# Patient Record
Sex: FEMALE | Race: ASIAN | NOT HISPANIC OR LATINO | Employment: OTHER | URBAN - METROPOLITAN AREA
[De-identification: names, ages, dates, MRNs, and addresses within clinical notes are randomized per-mention and may not be internally consistent; named-entity substitution may affect disease eponyms.]

---

## 2017-05-29 ENCOUNTER — OFFICE VISIT (OUTPATIENT)
Dept: PHYSICAL MEDICINE AND REHAB | Facility: CLINIC | Age: 32
End: 2017-05-29
Payer: MEDICARE

## 2017-05-29 VITALS
HEIGHT: 63 IN | DIASTOLIC BLOOD PRESSURE: 60 MMHG | BODY MASS INDEX: 19.14 KG/M2 | HEART RATE: 79 BPM | SYSTOLIC BLOOD PRESSURE: 95 MMHG | WEIGHT: 108 LBS

## 2017-05-29 DIAGNOSIS — G89.29 CHRONIC MIDLINE LOW BACK PAIN WITH BILATERAL SCIATICA: Primary | ICD-10-CM

## 2017-05-29 DIAGNOSIS — G11.9 CEREBELLAR ATAXIA: ICD-10-CM

## 2017-05-29 DIAGNOSIS — M54.41 CHRONIC MIDLINE LOW BACK PAIN WITH BILATERAL SCIATICA: Primary | ICD-10-CM

## 2017-05-29 DIAGNOSIS — R26.9 GAIT DISORDER: ICD-10-CM

## 2017-05-29 DIAGNOSIS — M54.42 CHRONIC MIDLINE LOW BACK PAIN WITH BILATERAL SCIATICA: Primary | ICD-10-CM

## 2017-05-29 PROCEDURE — 99213 OFFICE O/P EST LOW 20 MIN: CPT | Mod: PBBFAC | Performed by: PHYSICAL MEDICINE & REHABILITATION

## 2017-05-29 PROCEDURE — 99999 PR PBB SHADOW E&M-EST. PATIENT-LVL III: CPT | Mod: PBBFAC,,, | Performed by: PHYSICAL MEDICINE & REHABILITATION

## 2017-05-29 PROCEDURE — 99214 OFFICE O/P EST MOD 30 MIN: CPT | Mod: S$PBB,,, | Performed by: PHYSICAL MEDICINE & REHABILITATION

## 2017-05-29 RX ORDER — GABAPENTIN 300 MG/1
300 CAPSULE ORAL NIGHTLY
Qty: 90 CAPSULE | Refills: 1 | Status: SHIPPED | OUTPATIENT
Start: 2017-05-29 | End: 2018-04-23

## 2017-05-29 RX ORDER — MELOXICAM 7.5 MG/1
7.5 TABLET ORAL DAILY
Qty: 30 TABLET | Refills: 2 | Status: SHIPPED | OUTPATIENT
Start: 2017-05-29 | End: 2018-04-23 | Stop reason: SDUPTHER

## 2017-05-29 RX ORDER — TRAMADOL HYDROCHLORIDE 50 MG/1
50 TABLET ORAL
Qty: 100 TABLET | Refills: 1 | Status: SHIPPED | OUTPATIENT
Start: 2017-05-29 | End: 2018-04-23 | Stop reason: SDUPTHER

## 2017-05-29 NOTE — PROGRESS NOTES
Subjective:       Patient ID: Medina Hernandez is a 31 y.o. female.    Chief Complaint: No chief complaint on file.    HPI     HISTORY OF PRESENT ILLNESS:  Ms. Hernandez is a 31-year-old female with past   medical history of cerebellar ataxia who is followed up in the Physical Medicine   Clinic for gait instability, chronic low back pain and bilateral lower   extremity weakness.  Her last visit to the clinic was on 05/07/2015.  She was   maintained on ibuprofen, baclofen, and tramadol p.r.n.  She was prescribed   bilateral AFOs.  The patient was lost to follow up for personal reasons.  She is   coming to the clinic due to recent exacerbation of her back pain.    Her back pain has been waxing and waning, but recently worse.  It is an   intermittent aching pain in the lumbar spine.  She has occasional shooting pain   to the right calf with numbness and burning.  She has less frequent symptoms on   the left.  Her pain is worse with walking and better with rest.  Her maximum   pain is 8/10 and minimum 3/10.  Today, it is 8/10.  The patient denies any   significant change in her lower extremity strength.  She denies any bowel or   bladder incontinence.  She is having some difficulty with her mobility and   activities of daily living.    The patient has been taking ibuprofen, baclofen and p.r.n. tramadol.  However,   she has been out for a few months.      MS/HN  dd: 05/29/2017 16:08:29 (CDT)  td: 05/30/2017 07:24:05 (CDT)  Doc ID   #[id number]  Job ID #[id number]    CC:       Review of Systems   Constitutional: Positive for fatigue.   Eyes: Negative for visual disturbance.   Respiratory: Negative for shortness of breath.    Cardiovascular: Negative for chest pain.   Gastrointestinal: Negative for blood in stool, nausea and vomiting.   Genitourinary: Negative for difficulty urinating.   Musculoskeletal: Positive for back pain and gait problem. Negative for arthralgias and neck pain.   Neurological: Negative for dizziness and  headaches.   Psychiatric/Behavioral: Negative for behavioral problems.       Objective:      Physical Exam   Constitutional: She is oriented to person, place, and time. She appears well-developed and well-nourished.   HENT:   Head: Normocephalic and atraumatic.   Neck: Normal range of motion.   Cardiovascular: Normal rate, regular rhythm and normal heart sounds.    Pulmonary/Chest: Effort normal and breath sounds normal.   Abdominal: Soft.   Musculoskeletal:   BUE:  ROM:full.  Strength:    RUE: 5/5 at shoulder abduction, 5 elbow flexion, 5 elbow extension, 5 hand .   LUE: 5/5 at shoulder abduction, 5 elbow flexion, 5 elbow extension, 5 hand .  Sensation to pinprick:   RUE: intact.   LUE: intact.  DTR:    RUE: +2 biceps, +2 triceps.   LUE:  +2 biceps, +2 triceps.    BLE:  ROM:full.  -ve bilateral knee crepitus.   Strength:    RLE: 3/5 at hip flexion, 4 knee extension, 4 ankle DF, 4 PF, 4 EHL.   LLE: 3/5 at hip flexion, 4 knee extension, 4 ankle DF, 4 PF, 4 EHL.  Sensation to pinprick:     RLE: intact.      LLE: intact.   DTR:     RLE: +3 knee, +3 ankle.    LLE: +3 knee, +3 ankle.  Clonus:    Rt ankle: +ve.    Lt ankle: +ve.  SLR:      RLE: +ve at 30 degrees.      LLE: +ve at 30 degrees.     +ve tenderness over lumbar spine.    Gait: using a rollator walker, spastic, slow phil, foot drag.   Neurological: She is alert and oriented to person, place, and time.   Skin: Skin is warm.   Psychiatric: She has a normal mood and affect. Her behavior is normal.   Vitals reviewed.      Assessment:       1. Chronic midline low back pain with bilateral sciatica    2. Cerebellar ataxia    3. Gait disorder        Plan:         Diagnoses and all orders for this visit:    Chronic midline low back pain with bilateral sciatica  -     meloxicam (MOBIC) 7.5 MG tablet; Take 1 tablet (7.5 mg total) by mouth once daily.  -     gabapentin (NEURONTIN) 300 MG capsule; Take 1 capsule (300 mg total) by mouth every evening. In 1 wk, if  no relief, increase to twice daily.In another wk, may increase to 3 times.  -     tramadol (ULTRAM) 50 mg tablet; Take 1 tablet (50 mg total) by mouth every 6 to 8 hours as needed for Pain.  -     Ambulatory referral to Home Health    Cerebellar ataxia  -     Ambulatory referral to Home Health    Gait disorder  -     Ambulatory referral to Home Health      Return in about 2 months (around 7/29/2017).

## 2017-05-31 ENCOUNTER — TELEPHONE (OUTPATIENT)
Dept: PHYSICAL MEDICINE AND REHAB | Facility: CLINIC | Age: 32
End: 2017-05-31

## 2017-05-31 NOTE — TELEPHONE ENCOUNTER
----- Message from Kaylan Cueva MA sent at 5/31/2017  3:16 PM CDT -----  Contact: Marine Flores ([facility])      ----- Message -----  From: Conrado Acosta  Sent: 5/31/2017   1:21 PM  To: Kathe BOYCE Staff    Requesting a  order for patient Contact #[phone] (fartun).

## 2017-06-02 ENCOUNTER — NURSE TRIAGE (OUTPATIENT)
Dept: ADMINISTRATIVE | Facility: CLINIC | Age: 32
End: 2017-06-02

## 2017-06-02 NOTE — TELEPHONE ENCOUNTER
Reason for Disposition   Drinking very little and has signs of dehydration (e.g., no urine > 12 hours, very dry mouth, very lightheaded)    Protocols used:  LOW BLOOD PRESSURE-KRISTIE  Spoke with Pam physical therapist with [facility]. She states patient's blood pressure is 80/48 x 2 and patient is dizzy. Patient is currently fasting, not eating or drinking for religous purposes.

## 2017-06-06 ENCOUNTER — TELEPHONE (OUTPATIENT)
Dept: PHYSICAL MEDICINE AND REHAB | Facility: CLINIC | Age: 32
End: 2017-06-06

## 2017-06-06 NOTE — TELEPHONE ENCOUNTER
----- Message from Charlette Leavitt MA sent at 6/5/2017 11:20 AM CDT -----  Contact: Ivett with [facility] [phone]      ----- Message -----  From: Stephon Nunez  Sent: 6/5/2017  11:13 AM  To: Kathe BOYCE Staff    Caller is requesting a return phone call about pt's blood pressure is low, she's fasting for Faith reason, a discharge may have to be given, pls call

## 2018-04-11 ENCOUNTER — TELEPHONE (OUTPATIENT)
Dept: PHYSICAL MEDICINE AND REHAB | Facility: CLINIC | Age: 33
End: 2018-04-11

## 2018-04-11 NOTE — TELEPHONE ENCOUNTER
Patient added to wait list    ----- Message from Jenny Patel sent at 4/11/2018 12:18 PM CDT -----  Contact: pt @ [phone]  Calling to schedule an appt, offered 6/21 but patient wants to be seen sooner. Please call.

## 2018-04-19 ENCOUNTER — TELEPHONE (OUTPATIENT)
Dept: PHYSICAL MEDICINE AND REHAB | Facility: CLINIC | Age: 33
End: 2018-04-19

## 2018-04-19 NOTE — TELEPHONE ENCOUNTER
patient added to wait list--        --- Message from Seun LANCE Berto sent at 4/19/2018 11:55 AM CDT -----  Contact: Self @ [phone]  Pt is calling to schedule a f/u appt w/ the doctor due to muscle spasm in leg. Pt declined first available on 06/26 and asked to be seen earlier. I added pt to wait lsit. Pls call if pt can be seen.

## 2018-04-23 ENCOUNTER — HOSPITAL ENCOUNTER (OUTPATIENT)
Dept: RADIOLOGY | Facility: HOSPITAL | Age: 33
Discharge: HOME | End: 2018-04-23
Attending: PHYSICAL MEDICINE & REHABILITATION
Payer: MEDICARE

## 2018-04-23 ENCOUNTER — OFFICE VISIT (OUTPATIENT)
Dept: PHYSICAL MEDICINE AND REHAB | Facility: CLINIC | Age: 33
End: 2018-04-23
Payer: MEDICARE

## 2018-04-23 VITALS
WEIGHT: 103.63 LBS | HEIGHT: 63 IN | SYSTOLIC BLOOD PRESSURE: 98 MMHG | DIASTOLIC BLOOD PRESSURE: 68 MMHG | HEART RATE: 76 BPM | BODY MASS INDEX: 18.36 KG/M2

## 2018-04-23 DIAGNOSIS — G11.9 ATAXIA DUE TO CEREBELLAR DEGENERATION: ICD-10-CM

## 2018-04-23 DIAGNOSIS — R26.9 GAIT DISORDER: ICD-10-CM

## 2018-04-23 DIAGNOSIS — M54.41 CHRONIC MIDLINE LOW BACK PAIN WITH BILATERAL SCIATICA: Primary | ICD-10-CM

## 2018-04-23 DIAGNOSIS — M54.41 CHRONIC MIDLINE LOW BACK PAIN WITH BILATERAL SCIATICA: ICD-10-CM

## 2018-04-23 DIAGNOSIS — M54.42 CHRONIC MIDLINE LOW BACK PAIN WITH BILATERAL SCIATICA: Primary | ICD-10-CM

## 2018-04-23 DIAGNOSIS — M54.42 CHRONIC MIDLINE LOW BACK PAIN WITH BILATERAL SCIATICA: ICD-10-CM

## 2018-04-23 DIAGNOSIS — G89.29 CHRONIC MIDLINE LOW BACK PAIN WITH BILATERAL SCIATICA: Primary | ICD-10-CM

## 2018-04-23 DIAGNOSIS — G89.29 CHRONIC MIDLINE LOW BACK PAIN WITH BILATERAL SCIATICA: ICD-10-CM

## 2018-04-23 DIAGNOSIS — M62.838 MUSCLE SPASM: ICD-10-CM

## 2018-04-23 DIAGNOSIS — G11.9 CEREBELLAR ATAXIA: ICD-10-CM

## 2018-04-23 PROCEDURE — 99213 OFFICE O/P EST LOW 20 MIN: CPT | Mod: PBBFAC,25 | Performed by: PHYSICAL MEDICINE & REHABILITATION

## 2018-04-23 PROCEDURE — 72120 X-RAY BEND ONLY L-S SPINE: CPT | Mod: TC

## 2018-04-23 PROCEDURE — 99214 OFFICE O/P EST MOD 30 MIN: CPT | Mod: S$PBB,,, | Performed by: PHYSICAL MEDICINE & REHABILITATION

## 2018-04-23 PROCEDURE — 72100 X-RAY EXAM L-S SPINE 2/3 VWS: CPT | Mod: 26,,, | Performed by: RADIOLOGY

## 2018-04-23 PROCEDURE — 72120 X-RAY BEND ONLY L-S SPINE: CPT | Mod: 26,,, | Performed by: RADIOLOGY

## 2018-04-23 PROCEDURE — 99999 PR PBB SHADOW E&M-EST. PATIENT-LVL III: CPT | Mod: PBBFAC,,, | Performed by: PHYSICAL MEDICINE & REHABILITATION

## 2018-04-23 RX ORDER — BACLOFEN 10 MG/1
5-10 TABLET ORAL 3 TIMES DAILY PRN
Qty: 90 TABLET | Refills: 2 | Status: SHIPPED | OUTPATIENT
Start: 2018-04-23 | End: 2018-04-23 | Stop reason: SDUPTHER

## 2018-04-23 RX ORDER — BACLOFEN 10 MG/1
5-10 TABLET ORAL 3 TIMES DAILY PRN
Qty: 90 TABLET | Refills: 2 | Status: SHIPPED | OUTPATIENT
Start: 2018-04-23 | End: 2018-04-24 | Stop reason: SDUPTHER

## 2018-04-23 RX ORDER — GABAPENTIN 600 MG/1
600 TABLET ORAL 2 TIMES DAILY
Qty: 60 TABLET | Refills: 2 | Status: SHIPPED | OUTPATIENT
Start: 2018-04-23 | End: 2018-04-24 | Stop reason: SDUPTHER

## 2018-04-23 RX ORDER — MELOXICAM 7.5 MG/1
7.5 TABLET ORAL DAILY
Qty: 30 TABLET | Refills: 2 | Status: SHIPPED | OUTPATIENT
Start: 2018-04-23 | End: 2018-04-24 | Stop reason: SDUPTHER

## 2018-04-23 RX ORDER — TRAMADOL HYDROCHLORIDE 50 MG/1
50 TABLET ORAL 3 TIMES DAILY PRN
Qty: 90 TABLET | Refills: 1 | Status: SHIPPED | OUTPATIENT
Start: 2018-04-23 | End: 2018-04-24 | Stop reason: SDUPTHER

## 2018-04-23 NOTE — PROGRESS NOTES
Subjective:       Patient ID: Medina Hernandez is a 32 y.o. female.    Chief Complaint: No chief complaint on file.    HPI     HISTORY OF PRESENT ILLNESS:  Ms. Hernandez is a 32-year-old female with past   medical history of cerebellar ataxia.  She was followed up in the Physical   Medicine Clinic for gait instability, chronic low back pain and bilateral lower   extremity weakness.  Her last visit to the clinic was on 05/29/2017.  She was   maintained on meloxicam, gabapentin and p.r.n. tramadol.  She was referred to   home health.  The patient had few episodes of symptomatic hypertension that   necessitated discontinuation of her home health.  The patient was lost to follow   up.  She is coming today to the clinic to reestablish care.  She reports that   her back pain has been slowly worse.  It is an intermittent tightness in the   lumbar spine and across her back.  She has occasional radiation to both feet   with numbness.  Her pain is aggravated by bending.  It is better with lying   down.  Her maximum pain is 9-10/10 and minimum 5-6/10.  Today, it is 5-6/10.    The patient has limited ambulation due to ataxia.  She thinks her legs are   subjectively getting weaker.  She recalls one episode of bowel incontinence   about two weeks ago.    The patient has been inconsistent with taking her medications.  She is supposed   to be on meloxicam 7.5 mg p.o. once per day.  She takes baclofen 10 mg tablets,   half a tablet twice per day as needed.  She takes gabapentin 300 mg p.o. twice   per day.  She takes tramadol as needed, but infrequently.      MS/HN  dd: 04/23/2018 11:58:22 (CDT)  td: 04/24/2018 07:06:19 (CDT)  Doc ID   #[id number]  Job ID #[id number]    CC:           Review of Systems   Constitutional: Positive for fatigue.   Eyes: Negative for visual disturbance.   Respiratory: Positive for shortness of breath.    Cardiovascular: Negative for chest pain.   Gastrointestinal: Positive for constipation. Negative for blood in  stool, nausea and vomiting.   Genitourinary: Negative for difficulty urinating.   Musculoskeletal: Positive for back pain and gait problem. Negative for arthralgias and neck pain.   Neurological: Positive for dizziness. Negative for headaches.   Psychiatric/Behavioral: Positive for sleep disturbance. Negative for behavioral problems.       Objective:      Physical Exam   Constitutional: She is oriented to person, place, and time. She appears well-developed and well-nourished.   HENT:   Head: Normocephalic and atraumatic.   Neck: Normal range of motion.   Musculoskeletal:   BUE:  ROM:full.  Strength:    RUE: 5/5 at shoulder abduction, 5 elbow flexion, 5 elbow extension, 5 hand .   LUE: 5/5 at shoulder abduction, 5 elbow flexion, 5 elbow extension, 5 hand .  Sensation to pinprick:   RUE: intact.   LUE: intact.  DTR:    RUE: +2 biceps, +2 triceps.   LUE:  +2 biceps, +2 triceps.    BLE:  ROM:full.  -ve bilateral knee crepitus.   Strength:    RLE: 3/5 at hip flexion, 4 knee extension, 4 ankle DF, 4 PF, 4 EHL.   LLE: 3/5 at hip flexion, 4 knee extension, 4 ankle DF, 4 PF, 4 EHL.  Sensation to pinprick:     RLE: intact.      LLE: intact.   DTR:     RLE: +3 knee, +3 ankle.    LLE: +3 knee, +3 ankle.  Clonus:    Rt ankle: -ve.    Lt ankle: +ve.  SLR (sitting):      RLE: +ve.      LLE: +ve.     Neurological: She is alert and oriented to person, place, and time.   Skin: Skin is warm.   Psychiatric: She has a normal mood and affect. Her behavior is normal.   Vitals reviewed.      Assessment:       1. Chronic midline low back pain with bilateral sciatica    2. Muscle spasm    3. Cerebellar ataxia    4. Gait disorder        Plan:         - X-Ray Lumbar Spine Ap Lateral w/Flex Ext; Future  - Continue meloxicam (MOBIC) 7.5 MG tablet; Take 1 tablet (7.5 mg total) by mouth once daily.  - Continue gabapentin (NEURONTIN) 600 MG tablet; Take 1 tablet (600 mg total) by mouth 2 (two) times daily.  - Continue baclofen (LIORESAL) 10  MG tablet; Take 0.5-1 tablets (5-10 mg total) by mouth 3 (three) times daily as needed (muscle spasms). Take 1/2 tab BID PRN  - Continue traMADol (ULTRAM) 50 mg tablet; Take 1 tablet (50 mg total) by mouth 3 (three) times daily as needed for Pain.  - Follow-up in about 2 months (around 6/23/2018).     This was a 25 minute visit, more than 50% of which was spent counseling the patient about the diagnosis and the treatment plan.

## 2018-04-24 ENCOUNTER — TELEPHONE (OUTPATIENT)
Dept: PHYSICAL MEDICINE AND REHAB | Facility: CLINIC | Age: 33
End: 2018-04-24

## 2018-04-24 DIAGNOSIS — M54.41 CHRONIC MIDLINE LOW BACK PAIN WITH BILATERAL SCIATICA: ICD-10-CM

## 2018-04-24 DIAGNOSIS — G89.29 CHRONIC MIDLINE LOW BACK PAIN WITH BILATERAL SCIATICA: ICD-10-CM

## 2018-04-24 DIAGNOSIS — G11.9 ATAXIA DUE TO CEREBELLAR DEGENERATION: ICD-10-CM

## 2018-04-24 DIAGNOSIS — M54.42 CHRONIC MIDLINE LOW BACK PAIN WITH BILATERAL SCIATICA: ICD-10-CM

## 2018-04-24 RX ORDER — BACLOFEN 10 MG/1
5-10 TABLET ORAL 3 TIMES DAILY PRN
Qty: 90 TABLET | Refills: 2 | Status: SHIPPED | OUTPATIENT
Start: 2018-04-24 | End: 2018-07-24 | Stop reason: SDUPTHER

## 2018-04-24 RX ORDER — MELOXICAM 7.5 MG/1
7.5 TABLET ORAL DAILY
Qty: 30 TABLET | Refills: 2 | Status: SHIPPED | OUTPATIENT
Start: 2018-04-24 | End: 2018-07-23 | Stop reason: SDUPTHER

## 2018-04-24 RX ORDER — GABAPENTIN 600 MG/1
600 TABLET ORAL 2 TIMES DAILY
Qty: 60 TABLET | Refills: 2 | Status: SHIPPED | OUTPATIENT
Start: 2018-04-24 | End: 2018-07-23 | Stop reason: SDUPTHER

## 2018-04-24 RX ORDER — TRAMADOL HYDROCHLORIDE 50 MG/1
50 TABLET ORAL 3 TIMES DAILY PRN
Qty: 90 TABLET | Refills: 1 | Status: SHIPPED | OUTPATIENT
Start: 2018-04-24 | End: 2018-07-08 | Stop reason: SDUPTHER

## 2018-04-24 NOTE — TELEPHONE ENCOUNTER
----- Message from Jenny Patel sent at 4/24/2018  1:06 PM CDT -----  Contact: pt @ [phone]  Calling for the results of the xrays done on yesterday 4/23. Says that Dr. Archuleta's instructed her to call an leave a message to call her.

## 2018-04-24 NOTE — TELEPHONE ENCOUNTER
I called and informed the pt of the X-Rays of lumbar spine being unremarkable.  She wanted her medications switched from [facility] to [facility].

## 2018-07-08 DIAGNOSIS — M54.42 CHRONIC MIDLINE LOW BACK PAIN WITH BILATERAL SCIATICA: ICD-10-CM

## 2018-07-08 DIAGNOSIS — G89.29 CHRONIC MIDLINE LOW BACK PAIN WITH BILATERAL SCIATICA: ICD-10-CM

## 2018-07-08 DIAGNOSIS — M54.41 CHRONIC MIDLINE LOW BACK PAIN WITH BILATERAL SCIATICA: ICD-10-CM

## 2018-07-09 RX ORDER — TRAMADOL HYDROCHLORIDE 50 MG/1
TABLET ORAL
Qty: 90 TABLET | Refills: 0 | Status: SHIPPED | OUTPATIENT
Start: 2018-07-09 | End: 2018-08-23 | Stop reason: SDUPTHER

## 2018-07-23 DIAGNOSIS — M54.42 CHRONIC MIDLINE LOW BACK PAIN WITH BILATERAL SCIATICA: ICD-10-CM

## 2018-07-23 DIAGNOSIS — M54.41 CHRONIC MIDLINE LOW BACK PAIN WITH BILATERAL SCIATICA: ICD-10-CM

## 2018-07-23 DIAGNOSIS — G89.29 CHRONIC MIDLINE LOW BACK PAIN WITH BILATERAL SCIATICA: ICD-10-CM

## 2018-07-24 DIAGNOSIS — G11.9 ATAXIA DUE TO CEREBELLAR DEGENERATION: ICD-10-CM

## 2018-07-25 RX ORDER — MELOXICAM 7.5 MG/1
TABLET ORAL
Qty: 30 TABLET | Refills: 0 | Status: SHIPPED | OUTPATIENT
Start: 2018-07-25 | End: 2018-07-26 | Stop reason: SDUPTHER

## 2018-07-25 RX ORDER — GABAPENTIN 600 MG/1
TABLET ORAL
Qty: 60 TABLET | Refills: 0 | Status: SHIPPED | OUTPATIENT
Start: 2018-07-25 | End: 2018-08-10 | Stop reason: SDUPTHER

## 2018-07-25 RX ORDER — BACLOFEN 10 MG/1
TABLET ORAL
Qty: 270 TABLET | Refills: 0 | Status: SHIPPED | OUTPATIENT
Start: 2018-07-25 | End: 2018-10-12 | Stop reason: SDUPTHER

## 2018-07-26 DIAGNOSIS — G89.29 CHRONIC MIDLINE LOW BACK PAIN WITH BILATERAL SCIATICA: ICD-10-CM

## 2018-07-26 DIAGNOSIS — M54.42 CHRONIC MIDLINE LOW BACK PAIN WITH BILATERAL SCIATICA: ICD-10-CM

## 2018-07-26 DIAGNOSIS — M54.41 CHRONIC MIDLINE LOW BACK PAIN WITH BILATERAL SCIATICA: ICD-10-CM

## 2018-07-27 RX ORDER — MELOXICAM 7.5 MG/1
TABLET ORAL
Qty: 30 TABLET | Refills: 5 | Status: SHIPPED | OUTPATIENT
Start: 2018-07-27 | End: 2018-08-10 | Stop reason: SDUPTHER

## 2018-07-31 ENCOUNTER — TELEPHONE (OUTPATIENT)
Dept: PHYSICAL MEDICINE AND REHAB | Facility: CLINIC | Age: 33
End: 2018-07-31

## 2018-07-31 NOTE — TELEPHONE ENCOUNTER
Message on answer machine [phone].    Patient has this number in her chart as her moble  Number [phone].  I do not know  Shabana Gonzalez who asked to return the call.  I do not see in the patient chart a sign release form to discuss patient information.  Message on answer machine to call office back, need to speak with patient direct.        ----- Message from Shabana Gonzalez sent at 7/31/2018 11:48 AM CDT -----  Contact: patient  Called to ask why she was taken off her medications.     She would like a call back at [phone]    Thanks  KB

## 2018-07-31 NOTE — TELEPHONE ENCOUNTER
----- Message from Ene Boyer sent at 7/31/2018  4:17 PM CDT -----  Contact: Leonidas Kimbrough,    Pt is returning your call    Pt contact number [phone]  Thanks

## 2018-08-06 NOTE — TELEPHONE ENCOUNTER
Called [facility] @ [phone] patient Rx refill is ready for .  Called patient number to inform her, no answer.              Maegan BOYCE Staff   Caller: [phone] (2 days ago, 12:26 PM)             Pt says pharmacy says both medication has not been signed by doctor   gabapentin (NEURONTIN) 600 MG tablet   meloxicam (MOBIC) 7.5 MG tablet         [facility] phone [phone]       Than you!

## 2018-08-06 NOTE — TELEPHONE ENCOUNTER
----- Message from Maegan Agarwal sent at 8/4/2018 12:26 PM CDT -----  Contact: [phone]  Pt says pharmacy says both medication has not been signed by doctor   gabapentin (NEURONTIN) 600 MG tablet    meloxicam (MOBIC) 7.5 MG tablet         [facility] phone [phone]      Than you!

## 2018-08-10 DIAGNOSIS — M54.41 CHRONIC MIDLINE LOW BACK PAIN WITH BILATERAL SCIATICA: ICD-10-CM

## 2018-08-10 DIAGNOSIS — M54.42 CHRONIC MIDLINE LOW BACK PAIN WITH BILATERAL SCIATICA: ICD-10-CM

## 2018-08-10 DIAGNOSIS — G89.29 CHRONIC MIDLINE LOW BACK PAIN WITH BILATERAL SCIATICA: ICD-10-CM

## 2018-08-10 RX ORDER — GABAPENTIN 600 MG/1
TABLET ORAL
Qty: 60 TABLET | Refills: 1 | Status: SHIPPED | OUTPATIENT
Start: 2018-08-10 | End: 2018-10-02 | Stop reason: SDUPTHER

## 2018-08-10 RX ORDER — MELOXICAM 7.5 MG/1
TABLET ORAL
Qty: 30 TABLET | Refills: 2 | Status: SHIPPED | OUTPATIENT
Start: 2018-08-10 | End: 2018-11-15

## 2018-08-10 NOTE — TELEPHONE ENCOUNTER
07/25/18 last Rx refill Gabapentin  07/27/18 last Rx refill Mobic  04/23/18 last office visit  10/02/18 RTC        ----- Message from Maegan Agarwal sent at 8/9/2018  5:11 PM CDT -----  Contact: self   [phone]  Pt is calling for a refill on  gabapentin (NEURONTIN) 600 MG tablet    meloxicam (MOBIC) 7.5 MG tablet        [facility] phone [phone]      Thank you!

## 2018-08-23 DIAGNOSIS — M54.42 CHRONIC MIDLINE LOW BACK PAIN WITH BILATERAL SCIATICA: ICD-10-CM

## 2018-08-23 DIAGNOSIS — M54.41 CHRONIC MIDLINE LOW BACK PAIN WITH BILATERAL SCIATICA: ICD-10-CM

## 2018-08-23 DIAGNOSIS — G89.29 CHRONIC MIDLINE LOW BACK PAIN WITH BILATERAL SCIATICA: ICD-10-CM

## 2018-08-23 RX ORDER — TRAMADOL HYDROCHLORIDE 50 MG/1
TABLET ORAL
Qty: 90 TABLET | Refills: 0 | Status: SHIPPED | OUTPATIENT
Start: 2018-08-23 | End: 2018-09-25 | Stop reason: SDUPTHER

## 2018-08-23 NOTE — TELEPHONE ENCOUNTER
----- Message from Loida Weinberg sent at 8/23/2018  3:08 PM CDT -----  Contact: PT  Refill needed:   traMADol (ULTRAM) 50 mg tablet  The pharmacy is waiting for authorization.      Callback: [phone]

## 2018-09-25 DIAGNOSIS — M54.41 CHRONIC MIDLINE LOW BACK PAIN WITH BILATERAL SCIATICA: ICD-10-CM

## 2018-09-25 DIAGNOSIS — M54.42 CHRONIC MIDLINE LOW BACK PAIN WITH BILATERAL SCIATICA: ICD-10-CM

## 2018-09-25 DIAGNOSIS — G89.29 CHRONIC MIDLINE LOW BACK PAIN WITH BILATERAL SCIATICA: ICD-10-CM

## 2018-09-25 RX ORDER — TRAMADOL HYDROCHLORIDE 50 MG/1
TABLET ORAL
Qty: 90 TABLET | Refills: 0 | Status: SHIPPED | OUTPATIENT
Start: 2018-09-25 | End: 2018-10-28 | Stop reason: SDUPTHER

## 2018-10-02 ENCOUNTER — OFFICE VISIT (OUTPATIENT)
Dept: PHYSICAL MEDICINE AND REHAB | Facility: CLINIC | Age: 33
End: 2018-10-02
Payer: MEDICARE

## 2018-10-02 VITALS
BODY MASS INDEX: 19.14 KG/M2 | HEART RATE: 84 BPM | WEIGHT: 108 LBS | SYSTOLIC BLOOD PRESSURE: 98 MMHG | HEIGHT: 63 IN | DIASTOLIC BLOOD PRESSURE: 59 MMHG

## 2018-10-02 DIAGNOSIS — R26.9 GAIT DISORDER: ICD-10-CM

## 2018-10-02 DIAGNOSIS — G11.9 CEREBELLAR ATAXIA: ICD-10-CM

## 2018-10-02 DIAGNOSIS — G89.29 CHRONIC MIDLINE LOW BACK PAIN WITH BILATERAL SCIATICA: Primary | ICD-10-CM

## 2018-10-02 DIAGNOSIS — M54.41 CHRONIC MIDLINE LOW BACK PAIN WITH BILATERAL SCIATICA: Primary | ICD-10-CM

## 2018-10-02 DIAGNOSIS — M54.42 CHRONIC MIDLINE LOW BACK PAIN WITH BILATERAL SCIATICA: Primary | ICD-10-CM

## 2018-10-02 DIAGNOSIS — M62.838 MUSCLE SPASM: ICD-10-CM

## 2018-10-02 PROCEDURE — 99999 PR PBB SHADOW E&M-EST. PATIENT-LVL III: CPT | Mod: PBBFAC,,, | Performed by: PHYSICAL MEDICINE & REHABILITATION

## 2018-10-02 PROCEDURE — 99214 OFFICE O/P EST MOD 30 MIN: CPT | Mod: S$PBB,,, | Performed by: PHYSICAL MEDICINE & REHABILITATION

## 2018-10-02 PROCEDURE — 99213 OFFICE O/P EST LOW 20 MIN: CPT | Mod: PBBFAC | Performed by: PHYSICAL MEDICINE & REHABILITATION

## 2018-10-02 RX ORDER — GABAPENTIN 600 MG/1
TABLET ORAL
Qty: 60 TABLET | Refills: 1 | Status: SHIPPED | OUTPATIENT
Start: 2018-10-02 | End: 2018-11-28

## 2018-10-02 NOTE — PROGRESS NOTES
Subjective:       Patient ID: Medina Hernandez is a 32 y.o. female.    Chief Complaint: No chief complaint on file.    HPI     HISTORY OF PRESENT ILLNESS:  Ms. Hernandez is a 32-year-old female with past   medical history of cerebellar ataxia who is followed up in the Physical Medicine   Clinic for chronic low back pain with bilateral lower extremity weakness and   radicular symptoms and for gait instability.  Her last visit to the clinic was   on 04/23/2018.  She was maintained on meloxicam, gabapentin, p.r.n. baclofen and   p.r.n. tramadol.  X-rays of the lumbar spine were ordered and were   unremarkable.    The patient has come to the clinic for followup.  Her back pain has been   recently worse.  She denies any preceding trauma.  It is an intermittent   tightness in the lumbar spine.  She has occasional radiation to both feet with   numbness.  Her pain is worse with bending and better with sitting down.  Her   maximum pain is 7-8/10 and minimum 3/10.  Today, it is 3/10.  The patient   complains of bilateral lower extremity weakness, but without significant change   from her last visit.  She complains, however, of severe painful spasms mostly at   night and normally associated with her back pain.  She denies any bowel or   bladder incontinence.    She is currently taking meloxicam 15 mg p.o. once per day, gabapentin 600 mg   twice per day (higher doses made her tired), baclofen 10 mg p.r.n., usually   twice per daily and tramadol 50 mg p.r.n., usually one tablet at bedtime.      MS/HN  dd: 10/02/2018 14:05:10 (CDT)  td: 10/03/2018 11:04:40 (CDT)  Doc ID   #[id number]  Job ID #[id number]    CC:         Review of Systems   Constitutional: Positive for fatigue.   Eyes: Negative for visual disturbance.   Respiratory: Positive for shortness of breath.    Cardiovascular: Negative for chest pain.   Gastrointestinal: Positive for constipation. Negative for blood in stool, nausea and vomiting.   Genitourinary: Negative for difficulty  urinating.   Musculoskeletal: Positive for back pain and gait problem. Negative for arthralgias and neck pain.   Neurological: Positive for dizziness. Negative for headaches.   Psychiatric/Behavioral: Positive for sleep disturbance. Negative for behavioral problems.       Objective:      Physical Exam   Constitutional: She is oriented to person, place, and time. She appears well-developed and well-nourished.   HENT:   Head: Normocephalic and atraumatic.   Neck: Normal range of motion.   Musculoskeletal:   BUE:  ROM:full.  Strength:    RUE: 5/5 at shoulder abduction, 5 elbow flexion, 5 elbow extension, 5 hand .   LUE: 5/5 at shoulder abduction, 5 elbow flexion, 5 elbow extension, 5 hand .  Sensation to pinprick:   RUE: intact.   LUE: intact.  DTR:    RUE: +2 biceps, +2 triceps.   LUE:  +2 biceps, +2 triceps.    BLE:  ROM:full.  -ve bilateral knee crepitus.   Strength:    RLE: 3/5 at hip flexion, 4 knee extension, 4+ ankle DF, 4+ PF.   LLE: 3/5 at hip flexion, 4 knee extension, 4+ ankle DF, 4+ PF.  Sensation to pinprick:     RLE: intact.      LLE: intact.   DTR:     RLE: +2 knee, +3 ankle.    LLE: +2 knee, +3 ankle.  Clonus:    Rt ankle: +ve.    Lt ankle: +ve.  SLR (sitting):      RLE: +ve.      LLE: +ve.     Neurological: She is alert and oriented to person, place, and time.   Skin: Skin is warm.   Psychiatric: She has a normal mood and affect. Her behavior is normal.   Vitals reviewed.        IMAGING STUDIES:    XR LUMBAR SPINE AP AND LAT WITH FLEX/EXT (4/23/18):    CLINICAL HISTORY:  Lumbago with sciatica, right side    TECHNIQUE:  AP and lateral views as well as lateral flexion and extension images are performed through the lumbar spine.    COMPARISON:  Lumbar spine MRI 09/25/2014.    FINDINGS:  There are 5 non-rib-bearing lumbar type vertebral bodies.  Allowing for mild rotation on lateral view, vertebral body heights, alignment and disc spaces are preserved.  I detect no fracture, lytic or blastic  lesion, spondylolysis, spondylolisthesis, or facet arthropathy.    Sacroiliac joints appear patent in their imaged extent.  I do not identify aortic atherosclerosis.    Metallic foreign body projects over the true pelvis on AP view, incompletely included in of uncertain significance.  Abundant fecal material is present in the colon and rectum.    Impression      Please see above.      Assessment:       1. Chronic midline low back pain with bilateral sciatica    2. Cerebellar ataxia    3. Muscle spasm    4. Gait disorder        Plan:         - X-Ray findings were discussed with the patient.  - MRI Lumbar Spine Without Contrast; Future  - Continue meloxicam (MOBIC) 7.5 MG tablet; Take 1 tablet (7.5 mg total) by mouth once daily.  - Continue gabapentin (NEURONTIN) 600 MG tablet; Take 1 tablet (600 mg total) by mouth 2 (two) times daily.  - Increase baclofen (LIORESAL) 10 MG tablet; Take 0.5-1 tablets (5-10 mg total) by mouth 3 (three) times daily as needed (muscle spasms).   - Continue traMADol (ULTRAM) 50 mg tablet; Take 1 tablet (50 mg total) by mouth 3 (three) times daily as needed for Pain.  - Follow-up in about 3 months (around 1/2/2019).     This was a 25 minute visit, more than 50% of which was spent counseling the patient about the diagnosis and the treatment plan.

## 2018-10-09 ENCOUNTER — HOSPITAL ENCOUNTER (OUTPATIENT)
Dept: RADIOLOGY | Facility: HOSPITAL | Age: 33
Discharge: HOME | End: 2018-10-09
Attending: PHYSICAL MEDICINE & REHABILITATION
Payer: MEDICARE

## 2018-10-09 DIAGNOSIS — G89.29 CHRONIC MIDLINE LOW BACK PAIN WITH BILATERAL SCIATICA: ICD-10-CM

## 2018-10-09 DIAGNOSIS — M62.838 MUSCLE SPASM: ICD-10-CM

## 2018-10-09 DIAGNOSIS — M54.42 CHRONIC MIDLINE LOW BACK PAIN WITH BILATERAL SCIATICA: ICD-10-CM

## 2018-10-09 DIAGNOSIS — M54.41 CHRONIC MIDLINE LOW BACK PAIN WITH BILATERAL SCIATICA: ICD-10-CM

## 2018-10-09 PROCEDURE — 72148 MRI LUMBAR SPINE W/O DYE: CPT | Mod: 26,,, | Performed by: RADIOLOGY

## 2018-10-09 PROCEDURE — 72148 MRI LUMBAR SPINE W/O DYE: CPT | Mod: TC

## 2018-10-11 ENCOUNTER — TELEPHONE (OUTPATIENT)
Dept: PHYSICAL MEDICINE AND REHAB | Facility: CLINIC | Age: 33
End: 2018-10-11

## 2018-10-11 RX ORDER — DULOXETIN HYDROCHLORIDE 30 MG/1
30 CAPSULE, DELAYED RELEASE ORAL DAILY
Qty: 30 CAPSULE | Refills: 2 | Status: SHIPPED | OUTPATIENT
Start: 2018-10-11 | End: 2018-10-15 | Stop reason: SDUPTHER

## 2018-10-11 NOTE — TELEPHONE ENCOUNTER
----- Message from Loretta Williamson sent at 10/11/2018  8:46 AM CDT -----  Contact: [phone]  Pt called requesting her MRI results over the phone.    Pt can be reached at [phone]    Thanks  reny

## 2018-10-11 NOTE — TELEPHONE ENCOUNTER
I called the pt, informed her of MRI being normal.  She still c/o LBP with shooting sensations to her legs.  I will start her on Cymbalta.  At her request, I filled a certification of mobility impairment and will mail it to her.

## 2018-10-12 DIAGNOSIS — G11.9 ATAXIA DUE TO CEREBELLAR DEGENERATION: ICD-10-CM

## 2018-10-12 RX ORDER — BACLOFEN 10 MG/1
TABLET ORAL
Qty: 270 TABLET | Refills: 0 | Status: SHIPPED | OUTPATIENT
Start: 2018-10-12 | End: 2019-01-15 | Stop reason: SDUPTHER

## 2018-10-15 ENCOUNTER — TELEPHONE (OUTPATIENT)
Dept: PHYSICAL MEDICINE AND REHAB | Facility: CLINIC | Age: 33
End: 2018-10-15

## 2018-10-15 RX ORDER — DULOXETIN HYDROCHLORIDE 30 MG/1
30 CAPSULE, DELAYED RELEASE ORAL DAILY
Qty: 30 CAPSULE | Refills: 2 | Status: SHIPPED | OUTPATIENT
Start: 2018-10-15 | End: 2018-11-15 | Stop reason: SDUPTHER

## 2018-10-15 NOTE — TELEPHONE ENCOUNTER
----- Message from Vanessa Alvarez sent at 10/15/2018  9:00 AM CDT -----  Contact: self  Patient states need to speak with nurse.   Pt states experiencing back pain need medication called into [facility] pharmacy.   Please call pt at [phone] for more info

## 2018-10-28 DIAGNOSIS — M54.41 CHRONIC MIDLINE LOW BACK PAIN WITH BILATERAL SCIATICA: ICD-10-CM

## 2018-10-28 DIAGNOSIS — M54.42 CHRONIC MIDLINE LOW BACK PAIN WITH BILATERAL SCIATICA: ICD-10-CM

## 2018-10-28 DIAGNOSIS — G89.29 CHRONIC MIDLINE LOW BACK PAIN WITH BILATERAL SCIATICA: ICD-10-CM

## 2018-10-29 RX ORDER — TRAMADOL HYDROCHLORIDE 50 MG/1
TABLET ORAL
Qty: 90 TABLET | Refills: 1 | Status: SHIPPED | OUTPATIENT
Start: 2018-10-29 | End: 2018-11-15 | Stop reason: SDUPTHER

## 2018-11-12 ENCOUNTER — TELEPHONE (OUTPATIENT)
Dept: PHYSICAL MEDICINE AND REHAB | Facility: CLINIC | Age: 33
End: 2018-11-12

## 2018-11-12 NOTE — TELEPHONE ENCOUNTER
You can overbook her for Wed. at 340 per Doctor.  Appointment scheduled.        ----- Message from Loretta Williamson sent at 11/12/2018 10:00 AM CST -----  Contact: pt [phone]  Pt called requesting a call back from Dr Archuleta or staff.  Pt states the muscle spasms on both feet and hips has been increasing from 8-9 on scale, when this happens, she falls down if she is doing something in the kitchen and not near her walker.    Ptd deferred apt to see Dr Reid tomorrow, because her son has an apt in the morning.  Pt prefers to have Dr Archuleta call her to see if he can squeeze her in Wednesday, the day she is requesting.  Pt requested [facility]    [phone]  Thanks  reny

## 2018-11-13 ENCOUNTER — TELEPHONE (OUTPATIENT)
Dept: PHYSICAL MEDICINE AND REHAB | Facility: CLINIC | Age: 33
End: 2018-11-13

## 2018-11-13 NOTE — TELEPHONE ENCOUNTER
Patient unable to attend sooner appointment offered, tomorrow @ 4:00.  Patient appointment rescheduled for Thursday 11/15/18 @10:00  Message on patient answer machine.      ----- Message from Maegan Agarwal sent at 11/12/2018  4:46 PM CST -----  Contact: [phone]  Pt is calling to request a sooner appt pt is in pain and is requesting an morning appt.      Thank you!

## 2018-11-15 ENCOUNTER — TELEPHONE (OUTPATIENT)
Dept: NEUROLOGY | Facility: CLINIC | Age: 33
End: 2018-11-15

## 2018-11-15 ENCOUNTER — OFFICE VISIT (OUTPATIENT)
Dept: PHYSICAL MEDICINE AND REHAB | Facility: CLINIC | Age: 33
End: 2018-11-15
Payer: MEDICARE

## 2018-11-15 VITALS
DIASTOLIC BLOOD PRESSURE: 55 MMHG | HEIGHT: 63 IN | HEART RATE: 78 BPM | BODY MASS INDEX: 19.13 KG/M2 | SYSTOLIC BLOOD PRESSURE: 93 MMHG

## 2018-11-15 DIAGNOSIS — G11.9 CEREBELLAR ATAXIA: ICD-10-CM

## 2018-11-15 DIAGNOSIS — R26.9 GAIT DISORDER: ICD-10-CM

## 2018-11-15 DIAGNOSIS — M54.42 CHRONIC MIDLINE LOW BACK PAIN WITH BILATERAL SCIATICA: Primary | ICD-10-CM

## 2018-11-15 DIAGNOSIS — G89.29 CHRONIC MIDLINE LOW BACK PAIN WITH BILATERAL SCIATICA: Primary | ICD-10-CM

## 2018-11-15 DIAGNOSIS — M62.838 MUSCLE SPASM: ICD-10-CM

## 2018-11-15 DIAGNOSIS — G11.9 ATAXIA DUE TO CEREBELLAR DEGENERATION: ICD-10-CM

## 2018-11-15 DIAGNOSIS — M54.41 CHRONIC MIDLINE LOW BACK PAIN WITH BILATERAL SCIATICA: Primary | ICD-10-CM

## 2018-11-15 PROCEDURE — 99999 PR PBB SHADOW E&M-EST. PATIENT-LVL III: CPT | Mod: PBBFAC,,, | Performed by: PHYSICAL MEDICINE & REHABILITATION

## 2018-11-15 PROCEDURE — 99213 OFFICE O/P EST LOW 20 MIN: CPT | Mod: PBBFAC | Performed by: PHYSICAL MEDICINE & REHABILITATION

## 2018-11-15 PROCEDURE — 99214 OFFICE O/P EST MOD 30 MIN: CPT | Mod: S$PBB,,, | Performed by: PHYSICAL MEDICINE & REHABILITATION

## 2018-11-15 RX ORDER — DULOXETIN HYDROCHLORIDE 30 MG/1
30 CAPSULE, DELAYED RELEASE ORAL DAILY
Qty: 30 CAPSULE | Refills: 2 | Status: SHIPPED | OUTPATIENT
Start: 2018-11-15 | End: 2018-11-28 | Stop reason: SINTOL

## 2018-11-15 RX ORDER — TRAMADOL HYDROCHLORIDE 50 MG/1
50-100 TABLET ORAL 3 TIMES DAILY PRN
Qty: 180 TABLET | Refills: 2 | Status: SHIPPED | OUTPATIENT
Start: 2018-11-15 | End: 2019-02-17 | Stop reason: SDUPTHER

## 2018-11-15 NOTE — PROGRESS NOTES
Subjective:       Patient ID: Medina Hernandez is a 32 y.o. female.    Chief Complaint: No chief complaint on file.    HPI     HISTORY OF PRESENT ILLNESS:  Ms. Hernandez is a 32-year-old female with past   medical history of cerebellar ataxia.  She is followed up in the [facility] for chronic low back pain with bilateral lower extremity   weakness, lower extremity radicular symptoms and gait instability.  Her last   visit to the clinic was on 10/02/2018.  Her back pain and leg pain was getting   worse.  An MRI of the lumbar spine was ordered.  She was maintained on   meloxicam, gabapentin, p.r.n. baclofen and p.r.n. tramadol.    The patient has come to the clinic for followup.  Her back pain has been stable.    It is an intermittent tightness in the whole lumbar spine and paravertebral   area.  She has occasional shooting pain to both feet with tingling.  The pain is   aggravated by activity and walking, although it can be sporadic and happen at   rest.  Her maximum pain is 7-8/10 and minimum 5-6/10.  Today, it is 7-8/10.  The   patient complains of bilateral lower extremity weakness that has subjectively   gotten worse since the last visit.  She denies any bowel or bladder   incontinence.    Review of the chart shows that she has seen in the past Neurology (Dr. Camargo)   for her ataxia in December 2014.  She was lost to followup.    She is currently taking baclofen one tablet p.o. t.i.d.  She takes gabapentin   600 mg p.o. twice per day.  She takes Mobic 7.5 mg p.o. once per day.  She is on   tramadol 50 mg p.r.n., usually one tablet three times per day.  She reports   suboptimal relief of her symptoms.      MS/HN  dd: 11/15/2018 11:58:54 (CST)  td: 11/16/2018 07:19:34 (CST)  Doc ID   #[id number]  Job ID #[id number]    CC:           Review of Systems   Constitutional: Positive for fatigue.   Eyes: Negative for visual disturbance.   Respiratory: Negative for shortness of breath.    Cardiovascular: Negative for  chest pain.   Gastrointestinal: Positive for constipation. Negative for blood in stool, nausea and vomiting.   Genitourinary: Negative for difficulty urinating.   Musculoskeletal: Positive for back pain and gait problem. Negative for arthralgias and neck pain.   Neurological: Positive for dizziness. Negative for headaches.   Psychiatric/Behavioral: Positive for sleep disturbance. Negative for behavioral problems.       Objective:      Physical Exam   Constitutional: She is oriented to person, place, and time. She appears well-developed and well-nourished.   HENT:   Head: Normocephalic and atraumatic.   Neck: Normal range of motion.   Musculoskeletal:   BUE:  ROM:full.  Strength:    RUE: 5/5 at shoulder abduction, 5 elbow flexion, 5 elbow extension, 5 hand .   LUE: 5/5 at shoulder abduction, 5 elbow flexion, 5 elbow extension, 5 hand .  Sensation to pinprick:   RUE: intact.   LUE: intact.  DTR:    RUE: +2 biceps, +2 triceps.   LUE:  +2 biceps, +2 triceps.  Disla:   RUE: -ve.   LUE: -ve.    BLE:  ROM:full.  -ve bilateral knee crepitus.   Strength:    RLE: 3/5 at hip flexion, 4 knee extension, 4+ ankle DF, 4+ PF.   LLE: 3/5 at hip flexion, 4 knee extension, 4+ ankle DF, 4+ PF.  Sensation to pinprick:     RLE: intact.      LLE: intact.   DTR:     RLE: +3 knee, +3 ankle.    LLE: +3 knee, +3 ankle.  Clonus:    Rt ankle: +ve.    Lt ankle: +ve.  SLR (sitting):      RLE: +ve.      LLE: +ve.     Neurological: She is alert and oriented to person, place, and time.   Skin: Skin is warm.   Psychiatric: She has a normal mood and affect. Her behavior is normal.   Vitals reviewed.        IMAGING STUDIES:    MRI LUMBAR SPINE WITHOUT CONTRAST    CLINICAL HISTORY:  chronic LBP, bilateral radiculopathy; Lumbago with sciatica, right side    TECHNIQUE:  Multiplanar, multisequence MR images were acquired from the thoracolumbar junction to the sacrum without the administration of contrast.    COMPARISON:  MRI lumbar spine  09/05/2014    FINDINGS:  Lumbar vertebral body alignment is within normal limits noting slight exaggeration of normal lumbar lordosis, unchanged.  The vertebral body heights are well maintained without evidence of acute fracture.  There is no marrow signal abnormality suspicious for infiltrative process.  Intervertebral disc heights appear maintained without significant disc desiccation.  The conus is normal in appearance and terminates at the inferior aspect of the L1 vertebral body.    L1-L2: No significant spinal canal or neural foraminal narrowing.    L2-L3: No significant spinal canal or neural foraminal narrowing.    L3-L4: No significant spinal canal or neural foraminal narrowing.    L4-L5: No significant spinal canal or neural foraminal narrowing.    L5-S1: No significant spinal canal or neural foraminal narrowing.    The adjacent soft tissue structures show no significant abnormalities.    Impression      MRI lumbar spine demonstrates no significant spinal canal or neural foraminal narrowing.          Assessment:       1. Chronic midline low back pain with bilateral sciatica    2. Cerebellar ataxia    3. Muscle spasm    4. Gait disorder    5. 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  - Discontinue meloxicam (ineffective, and to minimize GI problems)  - Continue gabapentin (NEURONTIN) 600 MG tablet; Take 1 tablet (600 mg total) by mouth 2 (two) times daily.  - Increase baclofen (LIORESAL) 10 MG tablet; Take 0.5-1 tablets (5-10 mg total) by mouth 3 (three) times daily as needed (muscle spasms).   - Start DULoxetine (CYMBALTA) 30 MG capsule; Take 1 capsule (30 mg total) by mouth once daily.  - Increase traMADol (ULTRAM) 50 mg tablet; Take 1-2 tablets ( mg total) by mouth 3 (three) times daily as needed for Pain.  - Ambulatory consult to Neurology (Dr. Camargo, last saw her in 2014 the lost to follow up).  - Follow-up in about 3 months  (around 2/15/2019).     This was a 25 minute visit, more than 50% of which was spent counseling the patient about the diagnosis and the treatment plan.

## 2018-11-15 NOTE — TELEPHONE ENCOUNTER
----- Message from Kaylan Cueva MA sent at 11/15/2018  2:20 PM CST -----    Good afternoon, called scheduling department and was informed to contact you threw messaging to have this patient scheduled.  They are unable to schedule for your department.  Thank you.    Patient referral is in the chart, Brianna Hooks.    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

## 2018-11-20 ENCOUNTER — TELEPHONE (OUTPATIENT)
Dept: PHYSICAL MEDICINE AND REHAB | Facility: CLINIC | Age: 33
End: 2018-11-20

## 2018-11-20 NOTE — TELEPHONE ENCOUNTER
Please call office back.      ----- Message from Sweetie Liz sent at 11/20/2018 11:07 AM CST -----  Contact: Self  Pt is calling to speak with Staff regarding a new medication she was prescribed she says takes her appetite.  She is requesting a returned call for a change.    She can be reached at [phone].    Thank you.

## 2018-11-26 ENCOUNTER — TELEPHONE (OUTPATIENT)
Dept: PHYSICAL MEDICINE AND REHAB | Facility: CLINIC | Age: 33
End: 2018-11-26

## 2018-11-26 NOTE — TELEPHONE ENCOUNTER
----- Message from Berna Cam sent at 11/26/2018  9:49 AM CST -----  Contact: self @ [phone]  Pt says she was advised to call when she figuared out which medication is making her not hungary.  Pls call to discuss.

## 2018-11-27 ENCOUNTER — TELEPHONE (OUTPATIENT)
Dept: PHYSICAL MEDICINE AND REHAB | Facility: CLINIC | Age: 33
End: 2018-11-27

## 2018-11-27 NOTE — TELEPHONE ENCOUNTER
Both numbers called no answer.  Phone number [phone] does not ring.      ----- Message from Stephon Acosta MA sent at 11/27/2018 12:49 PM CST -----  Contact: Pt  Pt called and would like a call back from Shiloh Hooks regarding her med Duloxetine Dr 30mg's .      Pt can be reached at [phone].        Thanks

## 2018-11-28 RX ORDER — GABAPENTIN 600 MG/1
600 TABLET ORAL 3 TIMES DAILY
Start: 2018-11-28 | End: 2019-01-14

## 2018-11-29 NOTE — TELEPHONE ENCOUNTER
I called the pt.  She said duloxetine affected her appetite. She stopped it.,  I asked her to increase gabapentin to 600 mg po tid.

## 2018-12-11 RX ORDER — GABAPENTIN 600 MG/1
TABLET ORAL
Qty: 60 TABLET | Refills: 1 | Status: SHIPPED | OUTPATIENT
Start: 2018-12-11 | End: 2019-01-07 | Stop reason: SDUPTHER

## 2019-01-07 RX ORDER — GABAPENTIN 600 MG/1
600 TABLET ORAL 2 TIMES DAILY
Qty: 60 TABLET | Refills: 1 | Status: SHIPPED | OUTPATIENT
Start: 2019-01-07 | End: 2019-01-14

## 2019-01-07 NOTE — TELEPHONE ENCOUNTER
11/28/18 last Rx refill  11/15/18 last office visit  03/08/19 RTC    ----- Message from Loretta Williamson sent at 1/7/2019  3:06 PM CST -----  Contact: pt [phone]  Pt called requesting a call back from Dr Archuleta about her Gabapentin.  Pt states Dr Archuleta increased the dose on this drug but not the tab count, due to this, pt states she only has 3 pills left.  Pt is requesting that Leila is contacted to make correction.  Pt requested confirmation call when this is done.  Pt can be reached at [phone]    thanks  reny

## 2019-01-14 ENCOUNTER — TELEPHONE (OUTPATIENT)
Dept: PHYSICAL MEDICINE AND REHAB | Facility: CLINIC | Age: 34
End: 2019-01-14

## 2019-01-14 RX ORDER — GABAPENTIN 600 MG/1
600 TABLET ORAL 3 TIMES DAILY
Qty: 90 TABLET | Refills: 2 | Status: SHIPPED | OUTPATIENT
Start: 2019-01-14 | End: 2019-12-18 | Stop reason: SDUPTHER

## 2019-01-14 NOTE — TELEPHONE ENCOUNTER
----- Message from Betty Oquendo sent at 1/14/2019 11:21 AM CST -----  Contact: Self [phone]  Pt is requesting a call back to get a new RX for the following due to an increase in dosing instructions:    gabapentin (NEURONTIN) 600 MG tablet   11/28/2018  No     Pharmacy Contact     Telephone Fax  [phone]    Pt may be reached at [phone].    Thank you.  LC

## 2019-01-15 DIAGNOSIS — G11.9 ATAXIA DUE TO CEREBELLAR DEGENERATION: ICD-10-CM

## 2019-01-15 RX ORDER — BACLOFEN 10 MG/1
TABLET ORAL
Qty: 270 TABLET | Refills: 0 | Status: SHIPPED | OUTPATIENT
Start: 2019-01-15 | End: 2019-02-17 | Stop reason: SDUPTHER

## 2019-02-14 RX ORDER — DULOXETIN HYDROCHLORIDE 30 MG/1
CAPSULE, DELAYED RELEASE ORAL
Qty: 30 CAPSULE | Refills: 1 | Status: SHIPPED | OUTPATIENT
Start: 2019-02-14 | End: 2019-03-21

## 2019-02-17 DIAGNOSIS — M54.41 CHRONIC MIDLINE LOW BACK PAIN WITH BILATERAL SCIATICA: ICD-10-CM

## 2019-02-17 DIAGNOSIS — M54.42 CHRONIC MIDLINE LOW BACK PAIN WITH BILATERAL SCIATICA: ICD-10-CM

## 2019-02-17 DIAGNOSIS — G89.29 CHRONIC MIDLINE LOW BACK PAIN WITH BILATERAL SCIATICA: ICD-10-CM

## 2019-02-17 DIAGNOSIS — G11.9 ATAXIA DUE TO CEREBELLAR DEGENERATION: ICD-10-CM

## 2019-02-17 RX ORDER — TRAMADOL HYDROCHLORIDE 50 MG/1
TABLET ORAL
Qty: 180 TABLET | Refills: 0 | Status: SHIPPED | OUTPATIENT
Start: 2019-02-17 | End: 2019-03-12 | Stop reason: SDUPTHER

## 2019-02-17 RX ORDER — BACLOFEN 10 MG/1
TABLET ORAL
Qty: 270 TABLET | Refills: 0 | Status: SHIPPED | OUTPATIENT
Start: 2019-02-17 | End: 2020-04-16

## 2019-03-12 DIAGNOSIS — M54.42 CHRONIC MIDLINE LOW BACK PAIN WITH BILATERAL SCIATICA: ICD-10-CM

## 2019-03-12 DIAGNOSIS — G89.29 CHRONIC MIDLINE LOW BACK PAIN WITH BILATERAL SCIATICA: ICD-10-CM

## 2019-03-12 DIAGNOSIS — M54.41 CHRONIC MIDLINE LOW BACK PAIN WITH BILATERAL SCIATICA: ICD-10-CM

## 2019-03-12 RX ORDER — TRAMADOL HYDROCHLORIDE 50 MG/1
TABLET ORAL
Qty: 180 TABLET | Refills: 0 | Status: SHIPPED | OUTPATIENT
Start: 2019-03-12 | End: 2019-05-01 | Stop reason: SDUPTHER

## 2019-03-21 ENCOUNTER — OFFICE VISIT (OUTPATIENT)
Dept: INTERNAL MEDICINE | Facility: CLINIC | Age: 34
End: 2019-03-21
Payer: MEDICARE

## 2019-03-21 ENCOUNTER — TELEPHONE (OUTPATIENT)
Dept: INTERNAL MEDICINE | Facility: CLINIC | Age: 34
End: 2019-03-21

## 2019-03-21 ENCOUNTER — LAB VISIT (OUTPATIENT)
Dept: LAB | Facility: HOSPITAL | Age: 34
End: 2019-03-21
Attending: HOSPITALIST
Payer: MEDICARE

## 2019-03-21 VITALS
RESPIRATION RATE: 18 BRPM | OXYGEN SATURATION: 95 % | SYSTOLIC BLOOD PRESSURE: 94 MMHG | BODY MASS INDEX: 19.64 KG/M2 | HEIGHT: 63 IN | WEIGHT: 110.88 LBS | HEART RATE: 74 BPM | DIASTOLIC BLOOD PRESSURE: 60 MMHG | TEMPERATURE: 98 F

## 2019-03-21 DIAGNOSIS — E61.1 IRON DEFICIENCY: ICD-10-CM

## 2019-03-21 DIAGNOSIS — G80.9 CEREBRAL PALSY, UNSPECIFIED TYPE: ICD-10-CM

## 2019-03-21 DIAGNOSIS — Z13.6 ENCOUNTER FOR LIPID SCREENING FOR CARDIOVASCULAR DISEASE: ICD-10-CM

## 2019-03-21 DIAGNOSIS — Z11.59 ENCOUNTER FOR HEPATITIS C SCREENING TEST FOR LOW RISK PATIENT: ICD-10-CM

## 2019-03-21 DIAGNOSIS — Z13.220 ENCOUNTER FOR LIPID SCREENING FOR CARDIOVASCULAR DISEASE: ICD-10-CM

## 2019-03-21 DIAGNOSIS — J02.9 PHARYNGITIS, UNSPECIFIED ETIOLOGY: ICD-10-CM

## 2019-03-21 DIAGNOSIS — Z00.00 ENCOUNTER FOR PREVENTIVE HEALTH EXAMINATION: ICD-10-CM

## 2019-03-21 DIAGNOSIS — Z11.4 ENCOUNTER FOR SCREENING FOR HIV: ICD-10-CM

## 2019-03-21 DIAGNOSIS — Z00.00 ENCOUNTER FOR PREVENTIVE HEALTH EXAMINATION: Primary | ICD-10-CM

## 2019-03-21 LAB
ALBUMIN SERPL BCP-MCNC: 4.2 G/DL
ALP SERPL-CCNC: 50 U/L
ALT SERPL W/O P-5'-P-CCNC: 11 U/L
ANION GAP SERPL CALC-SCNC: 8 MMOL/L
AST SERPL-CCNC: 17 U/L
BASOPHILS # BLD AUTO: 0.04 K/UL
BASOPHILS NFR BLD: 0.9 %
BILIRUB SERPL-MCNC: 0.5 MG/DL
BUN SERPL-MCNC: 8 MG/DL
CALCIUM SERPL-MCNC: 9.2 MG/DL
CHLORIDE SERPL-SCNC: 104 MMOL/L
CHOLEST SERPL-MCNC: 117 MG/DL
CHOLEST/HDLC SERPL: 2.3 {RATIO}
CO2 SERPL-SCNC: 27 MMOL/L
CREAT SERPL-MCNC: 0.6 MG/DL
DEPRECATED S PYO AG THROAT QL EIA: NEGATIVE
DIFFERENTIAL METHOD: NORMAL
EOSINOPHIL # BLD AUTO: 0.1 K/UL
EOSINOPHIL NFR BLD: 1.6 %
ERYTHROCYTE [DISTWIDTH] IN BLOOD BY AUTOMATED COUNT: 12.3 %
EST. GFR  (AFRICAN AMERICAN): >60 ML/MIN/1.73 M^2
EST. GFR  (NON AFRICAN AMERICAN): >60 ML/MIN/1.73 M^2
FERRITIN SERPL-MCNC: 21 NG/ML
GLUCOSE SERPL-MCNC: 87 MG/DL
HCT VFR BLD AUTO: 40 %
HDLC SERPL-MCNC: 51 MG/DL
HDLC SERPL: 43.6 %
HGB BLD-MCNC: 12.8 G/DL
IMM GRANULOCYTES # BLD AUTO: 0.01 K/UL
IMM GRANULOCYTES NFR BLD AUTO: 0.2 %
IRON SERPL-MCNC: 98 UG/DL
LDLC SERPL CALC-MCNC: 60 MG/DL
LYMPHOCYTES # BLD AUTO: 1 K/UL
LYMPHOCYTES NFR BLD: 24 %
MCH RBC QN AUTO: 30 PG
MCHC RBC AUTO-ENTMCNC: 32 G/DL
MCV RBC AUTO: 94 FL
MONOCYTES # BLD AUTO: 0.3 K/UL
MONOCYTES NFR BLD: 7.9 %
NEUTROPHILS # BLD AUTO: 2.8 K/UL
NEUTROPHILS NFR BLD: 65.4 %
NONHDLC SERPL-MCNC: 66 MG/DL
NRBC BLD-RTO: 0 /100 WBC
PLATELET # BLD AUTO: 266 K/UL
PMV BLD AUTO: 9.9 FL
POTASSIUM SERPL-SCNC: 4.3 MMOL/L
PROT SERPL-MCNC: 7.1 G/DL
RBC # BLD AUTO: 4.26 M/UL
SATURATED IRON: 26 %
SODIUM SERPL-SCNC: 139 MMOL/L
TOTAL IRON BINDING CAPACITY: 379 UG/DL
TRANSFERRIN SERPL-MCNC: 256 MG/DL
TRANSFERRIN SERPL-MCNC: 256 MG/DL
TRIGL SERPL-MCNC: 30 MG/DL
WBC # BLD AUTO: 4.33 K/UL

## 2019-03-21 PROCEDURE — 99215 OFFICE O/P EST HI 40 MIN: CPT | Mod: PBBFAC,PO | Performed by: HOSPITALIST

## 2019-03-21 PROCEDURE — 80061 LIPID PANEL: CPT

## 2019-03-21 PROCEDURE — 82728 ASSAY OF FERRITIN: CPT

## 2019-03-21 PROCEDURE — 87081 CULTURE SCREEN ONLY: CPT

## 2019-03-21 PROCEDURE — 85025 COMPLETE CBC W/AUTO DIFF WBC: CPT

## 2019-03-21 PROCEDURE — 80053 COMPREHEN METABOLIC PANEL: CPT

## 2019-03-21 PROCEDURE — 86803 HEPATITIS C AB TEST: CPT

## 2019-03-21 PROCEDURE — 86703 HIV-1/HIV-2 1 RESULT ANTBDY: CPT

## 2019-03-21 PROCEDURE — 99214 OFFICE O/P EST MOD 30 MIN: CPT | Mod: S$PBB,,, | Performed by: HOSPITALIST

## 2019-03-21 PROCEDURE — 99999 PR PBB SHADOW E&M-EST. PATIENT-LVL V: CPT | Mod: PBBFAC,,, | Performed by: HOSPITALIST

## 2019-03-21 PROCEDURE — 36415 COLL VENOUS BLD VENIPUNCTURE: CPT | Mod: PO

## 2019-03-21 PROCEDURE — 87880 STREP A ASSAY W/OPTIC: CPT | Mod: PO

## 2019-03-21 PROCEDURE — 99999 PR PBB SHADOW E&M-EST. PATIENT-LVL V: ICD-10-PCS | Mod: PBBFAC,,, | Performed by: HOSPITALIST

## 2019-03-21 PROCEDURE — 83540 ASSAY OF IRON: CPT

## 2019-03-21 PROCEDURE — 99214 PR OFFICE/OUTPT VISIT, EST, LEVL IV, 30-39 MIN: ICD-10-PCS | Mod: S$PBB,,, | Performed by: HOSPITALIST

## 2019-03-21 NOTE — PROGRESS NOTES
Subjective:     @Patient ID: Medina Hernandez is a 33 y.o. female.    Chief Complaint: Establish Care; Sore Throat; Hypotension; and OB/GYN referral    HPI    33 y.o. female here for preventive exam and c/o sore throat x 1 week. Pt is new to me. Pt has cerebral palsy.  She is accompanied by her son and friend. She is from [place] but has been living here in [place]. However, pt wants to transfer care down here. Pt is  with 1 child. Pt uses a rolling walker at home due to her debility     She reports having sore throat x 1 week. Endorses subjective fevers, cough, runny nose. Mild cough is intermittent. No myalgias. No congestion. No sob, chest pain or palpitations.     Lipid disorders/ASCVD risk (ages >/= 45 or >/= 20 if increased risk ): ordered  DM (>45y yearly or if obese, HTN): A1c ordered  Hepatitis C (one time if born between 2064-8620): ordered  STD screening:   Eye exam: n/a   Breast Cancer (40-50y discretion of pt, 50-74y every 1-2 years): Mammogram n/a  Cervical Cancer (Pap Smear ages 21-65 every 3 years or Pap + HPV q5 years after 30 years of age):  Done 2018  Colorectal Cancer (normal risk 50-75yr): Colonoscopy n/a      Vaccines:   Influenza (yearly): Due   Tetanus (every 10 yrs - 1st tdap): unsure if had. Will get old pcp immunization records    PPSV23(>66yo or <65 w/ lung dz, smoking, DM) : n/a  PCV13 (> 65 or <65 w/ immunocompromised): n/a  Zoster (>59yo) n/a     Exercise: none  Diet:  Vegetarian diet, does eat Halal meat    Ob/Gyn Dr. Luis A Whitmore  [address], [place] [address].  [phone]/ fax [phone]  PCP: Dr Wyman [address]  [address],  [place] [address] [phone]    Past Medical History:   Diagnosis Date    Cerebral palsy     Gait abnormality     Low blood pressure      Past Surgical History:   Procedure Laterality Date     SECTION       History reviewed. No pertinent family history.  Social History     Socioeconomic  History    Marital status:      Spouse name: Not on file    Number of children: 1    Years of education: Not on file    Highest education level: Not on file   Social Needs    Financial resource strain: Not on file    Food insecurity - worry: Not on file    Food insecurity - inability: Not on file    Transportation needs - medical: Not on file    Transportation needs - non-medical: Not on file   Occupational History    Not on file   Tobacco Use    Smoking status: Never Smoker    Smokeless tobacco: Never Used   Substance and Sexual Activity    Alcohol use: No    Drug use: No    Sexual activity: Yes     Partners: Male     Birth control/protection: Condom   Other Topics Concern    Not on file   Social History Narrative    Not on file     Review of patient's allergies indicates:  No Known Allergies    Current Outpatient Medications:     baclofen (LIORESAL) 10 MG tablet, TAKE 1/2 TO 1 TABLET BY MOUTH THREE TIMES DAILY AS NEEDED FOR MUSCLE SPASMS, Disp: 270 tablet, Rfl: 0    gabapentin (NEURONTIN) 600 MG tablet, Take 1 tablet (600 mg total) by mouth 3 (three) times daily., Disp: 90 tablet, Rfl: 2    traMADol (ULTRAM) 50 mg tablet, TAKE 1 TO 2 TABLETS(50  MG) BY MOUTH THREE TIMES DAILY AS NEEDED FOR PAIN, Disp: 180 tablet, Rfl: 0          Review of Systems   Constitutional: Positive for fever. Negative for chills.   HENT: Positive for rhinorrhea and sore throat. Negative for congestion and postnasal drip.    Eyes: Negative for pain and visual disturbance.   Respiratory: Positive for cough. Negative for shortness of breath.    Cardiovascular: Negative for chest pain, palpitations and leg swelling.   Gastrointestinal: Negative for abdominal pain, nausea and vomiting.   Endocrine: Negative for polydipsia and polyuria.   Genitourinary: Negative for difficulty urinating and dysuria.   Musculoskeletal: Negative for arthralgias and back pain.   Skin: Negative for rash and wound.   Neurological:  "Positive for weakness. Negative for dizziness and headaches.        Chronic    Psychiatric/Behavioral: Negative for agitation and confusion.     Past medical history, surgical history, and family medical history reviewed and updated as appropriate.    Medications and allergies reviewed.     Objective:     Vitals:    03/21/19 1109   BP: 94/60   BP Location: Right arm   Patient Position: Sitting   BP Method: Medium (Manual)   Pulse: 74   Resp: 18   Temp: 98.3 °F (36.8 °C)   TempSrc: Oral   SpO2: 95%   Weight: 50.3 kg (110 lb 14.3 oz)   Height: 5' 3" (1.6 m)     Body mass index is 19.64 kg/m².  Physical Exam   Constitutional: She is oriented to person, place, and time. She appears well-developed and well-nourished. No distress.   Sitting in wheelchair    HENT:   Head: Normocephalic and atraumatic.   Mouth/Throat: Oropharynx is clear and moist. No oropharyngeal exudate.   Eyes: Conjunctivae are normal. Pupils are equal, round, and reactive to light. Right eye exhibits no discharge. Left eye exhibits no discharge.   Neck: Normal range of motion. Neck supple.   Cardiovascular: Normal rate, regular rhythm and intact distal pulses. Exam reveals no friction rub.   No murmur heard.  Pulmonary/Chest: Effort normal and breath sounds normal.   Abdominal: Soft. Bowel sounds are normal. She exhibits no distension. There is no tenderness. There is no guarding.   Musculoskeletal: She exhibits no edema.   4/5 lower ext strength b/l   Neurological: She is alert and oriented to person, place, and time.   Skin: Skin is warm and dry.   Psychiatric: She has a normal mood and affect. Her behavior is normal.   Vitals reviewed.      Lab Results   Component Value Date    ALT 11 12/04/2014    AST 17 12/04/2014     12/04/2014    K 3.4 (L) 12/04/2014     12/04/2014    CREATININE 0.7 12/04/2014    BUN 13 12/04/2014    CO2 27 12/04/2014       Assessment:     1. Encounter for preventive health examination    2. Pharyngitis, unspecified " etiology    3. Cerebral palsy, unspecified type    4. Iron deficiency    5. Encounter for lipid screening for cardiovascular disease    6. Encounter for screening for HIV    7. Encounter for hepatitis C screening test for low risk patient      Plan:   Medina was seen today for establish care, sore throat, hypotension and ob/gyn referral.    Diagnoses and all orders for this visit:    Encounter for preventive health examination  -     Comprehensive metabolic panel; Future  -     CBC auto differential; Future  -     Urinalysis; Future  -     Ambulatory Referral to Obstetrics / Gynecology        - Pt has a podiatry doctor. Reports she has another specialist she will see before starting physical therapy.     Pharyngitis, unspecified etiology  - Likely viral. Will hold off on abx. Pt to use cough drops, warm saltwater gargles, tylenol/nsaids prn for pain. Notify MD if no improvement   -     Throat Screen, Rapid    Cerebral palsy, unspecified type       - Pt follows with PMR     Iron deficiency  - Pt reports hx of iron deficiency. Will check iron labs.   -     Iron and TIBC; Future  -     Ferritin; Future  -     Transferrin; Future    Encounter for lipid screening for cardiovascular disease  -     Lipid panel; Future    Encounter for screening for HIV  -     HIV 1/2 Ag/Ab (4th Gen); Future    Encounter for hepatitis C screening test for low risk patient  -     Hepatitis C antibody; Future      Follow-up in about 6 months (around 9/21/2019), or if symptoms worsen or fail to improve.    Ivett Cardoso MD  Internal Medicine    3/21/2019

## 2019-03-22 ENCOUNTER — TELEPHONE (OUTPATIENT)
Dept: INTERNAL MEDICINE | Facility: CLINIC | Age: 34
End: 2019-03-22

## 2019-03-22 LAB
HCV AB SERPL QL IA: NEGATIVE
HIV 1+2 AB+HIV1 P24 AG SERPL QL IA: NEGATIVE

## 2019-03-22 NOTE — TELEPHONE ENCOUNTER
----- Message from Saira Cervantes sent at 3/22/2019 11:35 AM CDT -----  Regarding: Lab Client Services  Contact: [phone]  Hi my name is Saira I work in the Lab Client Services. We had a problem with some lab work on this patient. If someone from your office could call us at [phone] or ext [phone] that would be great. Anyone in my department can help. Thank you

## 2019-03-22 NOTE — TELEPHONE ENCOUNTER
----- Message from Ivett Cardoso MD sent at 3/22/2019  3:15 PM CDT -----  Please notify pt that hep C and HIV are negative. Iron studies are normal. CBC, cholesterol are normal. Electrolytes, kidney and liver function are normal. Urine unable to be collected by lab. Can repeat at next clinic visit in 6 months unless having any urinary symptoms currently

## 2019-03-24 LAB — BACTERIA THROAT CULT: NORMAL

## 2019-03-28 RX ORDER — GABAPENTIN 600 MG/1
TABLET ORAL
Qty: 60 TABLET | Refills: 2 | Status: SHIPPED | OUTPATIENT
Start: 2019-03-28 | End: 2019-06-07 | Stop reason: SDUPTHER

## 2019-04-25 ENCOUNTER — HOSPITAL ENCOUNTER (EMERGENCY)
Facility: HOSPITAL | Age: 34
Discharge: HOME | End: 2019-04-25
Attending: EMERGENCY MEDICINE
Payer: MEDICARE

## 2019-04-25 VITALS
HEIGHT: 63 IN | WEIGHT: 108 LBS | RESPIRATION RATE: 18 BRPM | OXYGEN SATURATION: 100 % | BODY MASS INDEX: 19.14 KG/M2 | HEART RATE: 72 BPM | TEMPERATURE: 98 F | SYSTOLIC BLOOD PRESSURE: 97 MMHG | DIASTOLIC BLOOD PRESSURE: 55 MMHG
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
## 2019-04-25 DIAGNOSIS — M54.2 NECK PAIN ON LEFT SIDE: ICD-10-CM

## 2019-04-25 DIAGNOSIS — S01.111A EYEBROW LACERATION, RIGHT, INITIAL ENCOUNTER: ICD-10-CM

## 2019-04-25 PROCEDURE — 99283 EMERGENCY DEPT VISIT LOW MDM: CPT | Mod: 25

## 2019-04-25 PROCEDURE — 12011 RPR F/E/E/N/L/M 2.5 CM/<: CPT

## 2019-04-25 PROCEDURE — 25000003 PHARM REV CODE 250: Performed by: EMERGENCY MEDICINE

## 2019-04-25 RX ORDER — ACETAMINOPHEN 500 MG
1000 TABLET ORAL
Status: COMPLETED | OUTPATIENT
Start: 2019-04-25 | End: 2019-04-25

## 2019-04-25 RX ORDER — LIDOCAINE HYDROCHLORIDE 10 MG/ML
10 INJECTION INFILTRATION; PERINEURAL
Status: COMPLETED | OUTPATIENT
Start: 2019-04-25 | End: 2019-04-25

## 2019-04-25 RX ADMIN — LIDOCAINE HYDROCHLORIDE 10 ML: 10 INJECTION, SOLUTION INFILTRATION; PERINEURAL at 07:04

## 2019-04-25 RX ADMIN — NEOMYCIN-BACITRACIN-POLYMYXIN OINT 1 EACH: OINTMENT at 08:04

## 2019-04-25 RX ADMIN — ACETAMINOPHEN 1000 MG: 500 TABLET ORAL at 07:04

## 2019-04-25 NOTE — ED PROVIDER NOTES
Encounter Date: 2019    SCRIBE #1 NOTE: I, French Johnston, am scribing for, and in the presence of,  Dr. Godinez. I have scribed the entire note.       History     Chief Complaint   Patient presents with    Fall     To ER per EMS with fall.  Pt with history of CP and fell while walking with walker.  Laceration noted to right forehead, abrasions to bilateral hands and elbows.  Pt denies LOC.     This is a 33 y.o. female who has a past medical history of Cerebral palsy, Gait abnormality, and Low blood pressure.     The patient presents to the ED via EMS with laceration to right forehead following fall occurring just PTA.  Patient has a history of cerebral palsy, and tripped and fell while ambulating with a walker.  She also reports left-sided neck pain, although she is able to fully range her neck.  She does not report any CP, lightheadedness, nausea, or dizziness prior to the fall.  Pt denies LOC, nausea, vomiting, abdominal pain, HA, or any other concerning symptoms.    The history is provided by the patient and the EMS personnel.     Review of patient's allergies indicates:  No Known Allergies  Past Medical History:   Diagnosis Date    Cerebral palsy     Gait abnormality     Low blood pressure      Past Surgical History:   Procedure Laterality Date     SECTION       No family history on file.  Social History     Tobacco Use    Smoking status: Never Smoker    Smokeless tobacco: Never Used   Substance Use Topics    Alcohol use: No    Drug use: No     Review of Systems   Constitutional: Negative for chills and fever.   HENT: Negative for facial swelling and trouble swallowing.    Eyes: Negative for redness.   Respiratory: Negative for shortness of breath.    Cardiovascular: Negative for chest pain.   Gastrointestinal: Negative for abdominal pain, diarrhea and vomiting.   Genitourinary: Negative for dysuria and hematuria.   Musculoskeletal: Positive for neck pain. Negative for gait problem.   Skin:  Positive for wound. Negative for rash.   Neurological: Negative for facial asymmetry and speech difficulty.     Physical Exam     Initial Vitals [04/25/19 1824]   BP Pulse Resp Temp SpO2   (!) 112/58 86 18 98.2 °F (36.8 °C) 100 %      MAP       --         Physical Exam    Nursing note and vitals reviewed.  Constitutional: She appears well-developed and well-nourished. She is not diaphoretic. No distress.   HENT:   Head: Normocephalic.   Mouth/Throat: Oropharynx is clear and moist.   1 cm full thickness laceration to the right lateral eyebrow not involving the galea; tender with swelling, no bony deformity   Eyes: EOM are normal. Pupils are equal, round, and reactive to light.   Neck: Normal range of motion. Neck supple.   Cardiovascular: Normal rate, regular rhythm, normal heart sounds and intact distal pulses.   Pulmonary/Chest: Breath sounds normal. No respiratory distress. She has no wheezes. She has no rhonchi. She has no rales.   Abdominal: Soft. Bowel sounds are normal. She exhibits no distension. There is no tenderness.   Musculoskeletal: Normal range of motion. She exhibits tenderness. She exhibits no edema.   Minimal paracervical tenderness around C2  No midline cervical TTP, stepoff, or defortmity  Full ROM   Lymphadenopathy:     She has no cervical adenopathy.   Neurological: She is alert and oriented to person, place, and time. She has normal strength.   Skin: Skin is warm and dry.   Psychiatric: She has a normal mood and affect. Thought content normal.       ED Course   Lac Repair  Date/Time: 4/25/2019 8:11 PM  Performed by: Francesco Godinez MD  Authorized by: Francesco Godinez MD   Body area: head/neck  Location details: right eyebrow  Laceration length: 1 cm  Foreign bodies: no foreign bodies  Tendon involvement: none  Nerve involvement: none  Vascular damage: no  Anesthesia: nerve block    Anesthesia:  Local Anesthetic: lidocaine 1% without epinephrine  Anesthetic total: 1 mL  Patient sedated:  no  Preparation: Patient was prepped and draped in the usual sterile fashion.  Irrigation solution: saline  Irrigation method: syringe  Amount of cleaning: standard  Debridement: none  Degree of undermining: none  Skin closure: 5-0 nylon  Number of sutures: 3  Technique: simple  Approximation: close  Approximation difficulty: simple  Dressing: antibiotic ointment and dressing applied  Patient tolerance: Patient tolerated the procedure well with no immediate complications        Labs Reviewed - No data to display          Medical Decision Making:   Initial Assessment:   This is an emergent evaluation of a 33 y.o.female patient with presentation of mechanical fall, right eyebrow trauma with lac. No deformity, no LOC, no headache, no focal deficit.     Plan for lac repair, pain control.                   ED Course as of Apr 25 2246   Thu Apr 25, 2019 2012 Clinical impression and plan discussed with patient.    Pt is to call for follow up with PCP in 7 days.  Pt to return to the ED for any new or concerning symptoms.  Aftercare instructions and return precautions provided to patient.   Pt expressed understanding and agrees with plan.      [EW]      ED Course User Index  [EW] French Vickie       Clinical Impression:       ICD-10-CM ICD-9-CM   1. Fall, initial encounter W19.XXXA E888.9   2. Eyebrow laceration, right, initial encounter S01.111A 873.42   3. Neck pain on left side M54.2 723.1       Disposition:   Disposition: Discharged  Condition: Stable       Francesco Godinez MD  04/25/19 2240

## 2019-04-26 NOTE — DISCHARGE INSTRUCTIONS
Keep wound dry for 2 days.  Change dressing twice a day with application of antibiotic ointment.  After 2 days, may wash gently.  Observe for any signs of infection including redness or discharge.  Follow up with her primary care provider or emergency department in 5 days for suture removal.    Thank you for choosing [facility]! We appreciate you coming to us for your medical care. We hope you feel better soon! Please come back to [facility] for all of your future medical needs.    Our goal in the emergency department is to always give you outstanding care and exceptional service. You may receive a survey by mail or e-mail in the next week regarding your experience in our ED. We would greatly appreciate your completing and returning the survey. Your feedback provides us with a way to recognize our staff who give very good care and it helps us learn how to improve when your experience was below our aspiration of excellence.       Sincerely,    Francesco Godinez MD  Medical Director  Emergency Department  [facility] [place]

## 2019-04-26 NOTE — ED TRIAGE NOTES
Pt presented to ED after fall stating she lost her balance and fell and hit her head ,pt denies any loc,  pt also hit her rt knee, l.5cm laceration noted above rt eyebrow,headache and neck pain noted pt rates pain 10/10 , pt has history of fall secondary to cp vs stable

## 2019-04-29 ENCOUNTER — TELEPHONE (OUTPATIENT)
Dept: PHYSICAL MEDICINE AND REHAB | Facility: CLINIC | Age: 34
End: 2019-04-29

## 2019-04-29 NOTE — TELEPHONE ENCOUNTER
Please call office back.  Next available appointment Sept      ----- Message from Maegan Agarwal sent at 4/29/2019  2:20 PM CDT -----  Contact: [phone]  Pt is calling to schedule an appt pt had a fall last week, hurt her back and her head. Pt has stiches on her forehead.    Pt is requesting an appt soon as possible.    Thank you!

## 2019-05-01 ENCOUNTER — HOSPITAL ENCOUNTER (EMERGENCY)
Facility: HOSPITAL | Age: 34
Discharge: HOME | End: 2019-05-01
Attending: EMERGENCY MEDICINE
Payer: MEDICARE

## 2019-05-01 VITALS
DIASTOLIC BLOOD PRESSURE: 59 MMHG | SYSTOLIC BLOOD PRESSURE: 105 MMHG | OXYGEN SATURATION: 99 % | HEART RATE: 72 BPM | HEIGHT: 63 IN | RESPIRATION RATE: 18 BRPM | TEMPERATURE: 99 F | WEIGHT: 108 LBS | BODY MASS INDEX: 19.14 KG/M2

## 2019-05-01 DIAGNOSIS — Z48.02 VISIT FOR SUTURE REMOVAL: Primary | ICD-10-CM

## 2019-05-01 DIAGNOSIS — M54.41 CHRONIC MIDLINE LOW BACK PAIN WITH BILATERAL SCIATICA: ICD-10-CM

## 2019-05-01 DIAGNOSIS — M54.42 CHRONIC MIDLINE LOW BACK PAIN WITH BILATERAL SCIATICA: ICD-10-CM

## 2019-05-01 DIAGNOSIS — G89.29 CHRONIC MIDLINE LOW BACK PAIN WITH BILATERAL SCIATICA: ICD-10-CM

## 2019-05-01 PROCEDURE — 25000003 PHARM REV CODE 250: Performed by: NURSE PRACTITIONER

## 2019-05-01 PROCEDURE — 99283 EMERGENCY DEPT VISIT LOW MDM: CPT

## 2019-05-01 RX ORDER — TRAMADOL HYDROCHLORIDE 50 MG/1
TABLET ORAL
Qty: 180 TABLET | Refills: 0 | Status: SHIPPED | OUTPATIENT
Start: 2019-05-01 | End: 2019-06-25 | Stop reason: SDUPTHER

## 2019-05-01 RX ADMIN — BACITRACIN ZINC, NEOMYCIN SULFATE, AND POLYMYXIN B SULFATE 1 EACH: 400; 3.5; 5 OINTMENT TOPICAL at 07:05

## 2019-05-02 NOTE — ED PROVIDER NOTES
Encounter Date: 2019       History     Chief Complaint   Patient presents with    Suture / Staple Removal     pt had sutures placed to right eyebrow last Wednesday. Is returning to have them removed     33-year-old female presents the ED for suture removal.  She was seen on 2019 for evaluation after a fall.  She reports that her wound is healing well.  No pain or drainage.  She denies fever.  The patient does report right 4th finger pain since the fall and is requesting an x-ray.  No new trauma. No other complaints at this time.    The history is provided by the patient.     Review of patient's allergies indicates:  No Known Allergies  Past Medical History:   Diagnosis Date    Cerebral palsy     Gait abnormality     Low blood pressure      Past Surgical History:   Procedure Laterality Date     SECTION       No family history on file.  Social History     Tobacco Use    Smoking status: Never Smoker    Smokeless tobacco: Never Used   Substance Use Topics    Alcohol use: No    Drug use: No     Review of Systems   Constitutional: Negative for activity change and fever.   Eyes: Negative for visual disturbance.   Musculoskeletal: Positive for arthralgias.   Skin: Positive for wound.   Allergic/Immunologic: Negative for immunocompromised state.   Neurological: Negative for weakness, light-headedness, numbness and headaches.   Psychiatric/Behavioral: Negative for confusion.       Physical Exam     Initial Vitals [19 1851]   BP Pulse Resp Temp SpO2   107/63 94 18 98.2 °F (36.8 °C) 99 %      MAP       --         Physical Exam    Nursing note and vitals reviewed.  Constitutional: Vital signs are normal. She appears well-developed and well-nourished. She is active and cooperative. She is easily aroused.  Non-toxic appearance. She does not have a sickly appearance. She does not appear ill. No distress.   HENT:   Head: Normocephalic and atraumatic.   Well-healed right eyebrow sutured laceration.       Eyes: Conjunctivae, EOM and lids are normal. Pupils are equal, round, and reactive to light.   Neck: Normal range of motion. Neck supple.   Cardiovascular: Normal rate and regular rhythm.   Pulmonary/Chest: Effort normal and breath sounds normal.   Abdominal: Soft. Normal appearance and bowel sounds are normal.   Musculoskeletal:        Right wrist: Normal.        Right hand: She exhibits normal range of motion, no tenderness, no bony tenderness, normal two-point discrimination, normal capillary refill, no deformity, no laceration and no swelling. Normal sensation noted. Normal strength noted.        Hands:  Neurological: She is alert, oriented to person, place, and time and easily aroused. She has normal strength. No sensory deficit. GCS eye subscore is 4. GCS verbal subscore is 5. GCS motor subscore is 6.   Skin: Skin is warm, dry and intact. Capillary refill takes less than 2 seconds. No bruising and no rash noted. No erythema.   Well-healed right eyebrow laceration without signs of infection.    Psychiatric: She has a normal mood and affect. Her speech is normal and behavior is normal. Judgment and thought content normal. Cognition and memory are normal.         ED Course   Suture Removal  Date/Time: 5/1/2019 7:24 PM  Location procedure was performed: [facility] EMERGENCY DEPARTMENT  Performed by: DAXA Haney  Authorized by: Francesco Godinez MD   Pre-operative diagnosis: Right eyebrow sutured laceration  Post-operative diagnosis: Right eyebrow healed laceration  Body area: head/neck  Location details: right eyebrow  Description of findings: Well-healed laceration without infection.    Wound Appearance: clean, well healed, normal color, nontender and no drainage  Sutures Removed: 3  Post-removal: no dressing applied  Facility: sutures placed in this facility  Complications: No  Specimens: No  Implants: No  Patient tolerance: Patient tolerated the procedure well with no immediate  complications        Labs Reviewed - No data to display       Imaging Results    None          Medical Decision Making:   Initial Assessment:   34yo female here for suture removal.   Differential Diagnosis:   Suture removal  ED Management:  Suture removal  Well-healed laceration.  Please see procedure note for suture removal.  Pt to follow up with PCP within 7 days.  I reviewed strict return precautions. In addition, pt is to return to the ED if condition changes, progresses, or if there are any concerns.  Pt verbalized understanding, compliance, and agreement with the treatment plan.                          Clinical Impression:       ICD-10-CM ICD-9-CM   1. Visit for suture removal Z48.02 V58.32                                Piedad Freitas, DAXA  05/01/19 2004

## 2019-06-02 ENCOUNTER — HOSPITAL ENCOUNTER (EMERGENCY)
Facility: HOSPITAL | Age: 34
Discharge: HOME | End: 2019-06-02
Attending: EMERGENCY MEDICINE
Payer: MEDICARE

## 2019-06-02 VITALS
HEART RATE: 71 BPM | WEIGHT: 108 LBS | RESPIRATION RATE: 18 BRPM | BODY MASS INDEX: 19.14 KG/M2 | TEMPERATURE: 98 F | OXYGEN SATURATION: 100 % | HEIGHT: 63 IN | SYSTOLIC BLOOD PRESSURE: 94 MMHG | DIASTOLIC BLOOD PRESSURE: 57 MMHG
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
## 2019-06-02 DIAGNOSIS — S09.90XA INJURY OF HEAD, INITIAL ENCOUNTER: ICD-10-CM

## 2019-06-02 DIAGNOSIS — S01.01XA SCALP LACERATION, INITIAL ENCOUNTER: Primary | ICD-10-CM

## 2019-06-02 PROCEDURE — 99283 EMERGENCY DEPT VISIT LOW MDM: CPT | Mod: 25

## 2019-06-02 PROCEDURE — 12001 RPR S/N/AX/GEN/TRNK 2.5CM/<: CPT

## 2019-06-02 PROCEDURE — 25000003 PHARM REV CODE 250: Performed by: NURSE PRACTITIONER

## 2019-06-02 RX ORDER — ACETAMINOPHEN 325 MG/1
650 TABLET ORAL
Status: COMPLETED | OUTPATIENT
Start: 2019-06-02 | End: 2019-06-02

## 2019-06-02 RX ADMIN — ACETAMINOPHEN 650 MG: 325 TABLET ORAL at 11:06

## 2019-06-02 NOTE — ED NOTES
APPEARANCE: Alert, oriented and in no acute distress.  CARDIAC: Normal rate and rhythm, no murmur heard.   PERIPHERAL VASCULAR: peripheral pulses present. Normal cap refill. No edema. Warm to touch.    RESPIRATORY:Normal rate and effort, breath sounds clear bilaterally throughout chest. Respirations are equal and unlabored no obvious signs of distress.  GASTRO: soft, bowel sounds normal, no tenderness, no abdominal distention.  MUSC: Full ROM. No bony tenderness or soft tissue tenderness. Mobility weak  SKIN: Skin is warm and dry, normal skin turgor, mucous membranes moist.laceration to left parietal lob of head  NEURO: 5/5 strength major flexors/extensors bilaterally. Sensory intact to light touch bilaterally. [place] coma scale: eyes open spontaneously-4, oriented & converses-5, obeys commands-6. No neurological abnormalities.   MENTAL STATUS: awake, alert and aware of environment.  EYE: PERRL, both eyes: pupils brisk and reactive to light. Normal size.  ENT: EARS: no obvious drainage. NOSE: no active bleeding.

## 2019-06-02 NOTE — ED NOTES
Patient presents to Er with c/o fall with laceration left parietal lobe of head, no medication taken, 8/10 on pain scale

## 2019-06-02 NOTE — ED NOTES
Patient is discharged, Waiting on transportation for pickup, pt also unable to give urine sample at this time

## 2019-06-02 NOTE — ED PROVIDER NOTES
Encounter Date: 2019       History     Chief Complaint   Patient presents with    Fall     trip and fall just pta. denies loc. lac to head. bleeding controlled.      33-year-old female with past medical history of CP presents the ED for a head injury. The patient reports that she was in the bathroom immediately prior to arrival when she accidentally tripped and fell, hitting her head on the hard floor.  She denies loss of consciousness.  She reports that she saw blood immediately called EMS.  She denies headache, neck pain, visual changes, weakness, numbness/tingling, and vomiting. No anticoagulant use.  No other complaints at this time.    The history is provided by the patient.     Review of patient's allergies indicates:  No Known Allergies  Past Medical History:   Diagnosis Date    Cerebral palsy     Gait abnormality     Low blood pressure      Past Surgical History:   Procedure Laterality Date     SECTION       History reviewed. No pertinent family history.  Social History     Tobacco Use    Smoking status: Never Smoker    Smokeless tobacco: Never Used   Substance Use Topics    Alcohol use: No    Drug use: No     Review of Systems   Constitutional: Negative for activity change, fatigue and fever.   HENT: Negative for congestion.    Eyes: Negative for visual disturbance.   Respiratory: Negative for cough and shortness of breath.    Cardiovascular: Negative for chest pain.   Gastrointestinal: Negative for nausea and vomiting.   Musculoskeletal: Negative for neck pain.   Skin: Positive for wound.   Allergic/Immunologic: Negative for immunocompromised state.   Neurological: Negative for dizziness, syncope, weakness, light-headedness and headaches.   Psychiatric/Behavioral: Negative for confusion.       Physical Exam     Initial Vitals [19 1052]   BP Pulse Resp Temp SpO2   (!) 94/57 71 18 97.7 °F (36.5 °C) 100 %      MAP       --         Physical Exam    Nursing note and vitals  reviewed.  Constitutional: Vital signs are normal. She appears well-developed and well-nourished. She is active and cooperative. She is easily aroused.  Non-toxic appearance. She does not have a sickly appearance. She does not appear ill. No distress.   Pt requesting Gail and Deann    HENT:   Head: Normocephalic. Head is with laceration. Head is without abrasion and without contusion. Hair is normal.       Right Ear: External ear and ear canal normal. No drainage. No hemotympanum.   Left Ear: External ear and ear canal normal. No drainage. No hemotympanum.   Nose: Nose normal. No rhinorrhea.   Mouth/Throat: Uvula is midline, oropharynx is clear and moist and mucous membranes are normal.   2cm laceration to scalp. No deformity or crepitus.  No step-off.  Bleeding controlled.    Eyes: Conjunctivae, EOM and lids are normal. Pupils are equal, round, and reactive to light.   Neck: Normal range of motion, full passive range of motion without pain and phonation normal. Neck supple. No spinous process tenderness present. Normal range of motion present. No neck rigidity.   Cardiovascular: Normal rate, regular rhythm and normal heart sounds.   Pulmonary/Chest: Effort normal and breath sounds normal.   Abdominal: Soft. Normal appearance and bowel sounds are normal.   Neurological: She is alert, oriented to person, place, and time and easily aroused. No cranial nerve deficit or sensory deficit. Coordination normal. GCS eye subscore is 4. GCS verbal subscore is 5. GCS motor subscore is 6.   CP   Skin: Skin is warm, dry and intact. Capillary refill takes less than 2 seconds. No rash noted.   Psychiatric: She has a normal mood and affect. Her speech is normal and behavior is normal. Judgment and thought content normal. Cognition and memory are normal.         ED Course   Lac Repair  Date/Time: 6/2/2019 11:42 AM  Performed by: DAXA Haney  Authorized by: Matheus Patel MD   Consent Done: Yes  Consent:  Verbal consent obtained. Written consent not obtained.  Risks and benefits: risks, benefits and alternatives were discussed  Consent given by: patient  Patient understanding: patient states understanding of the procedure being performed  Patient consent: the patient's understanding of the procedure matches consent given  Procedure consent: procedure consent matches procedure scheduled  Patient identity confirmed: verbally with patient  Body area: head/neck  Location details: scalp  Laceration length: 2 cm  Foreign bodies: no foreign bodies  Tendon involvement: none  Nerve involvement: none  Vascular damage: no  Patient sedated: no  Preparation: Patient was prepped and draped in the usual sterile fashion.  Irrigation solution: saline  Amount of cleaning: standard  Debridement: none  Degree of undermining: none  Skin closure: staples  Number of sutures: 2  Technique: simple  Approximation: close  Approximation difficulty: simple  Dressing: open (no dressing)  Patient tolerance: Patient tolerated the procedure well with no immediate complications        Labs Reviewed   POCT URINE PREGNANCY          Imaging Results    None          Medical Decision Making:   Initial Assessment:   34yo female here for a scalp laceration after mechanical fall.  No syncope or headache.  Patient has a 2 cm laceration to her left scalp.  No focal neuro findings.  Differential Diagnosis:   Fall, laceration, hematoma  ED Management:  Staple closure  No indication for imaging or labs at this time.  Pt has a laceration due to mechanical fall.  I do not suspect SAH or skull fracture.  The patient is tolerating oral fluids without difficulty.  Reviewed wound care and head injury precautions.  Pt to follow up with PCP within 2 days.  I reviewed strict return precautions. In addition, pt is to return to the ED if condition changes, progresses, or if there are any concerns.  Pt verbalized understanding, compliance, and agreement with the treatment  plan.    Pt's BP consistent with previous readings from ED and clinic.  No s/sx of hypotension.                       Clinical Impression:       ICD-10-CM ICD-9-CM   1. Scalp laceration, initial encounter S01.01XA 873.0   2. Fall, initial encounter W19.XXXA E888.9   3. Injury of head, initial encounter S09.90XA 959.01                                Piedad Freitas, [facility]  06/02/19 1151

## 2019-06-02 NOTE — DISCHARGE INSTRUCTIONS
Please read the included information. Return to the ED if your condition changes, progresses, or if you have any concerns.

## 2019-06-07 ENCOUNTER — OFFICE VISIT (OUTPATIENT)
Dept: INTERNAL MEDICINE | Facility: CLINIC | Age: 34
End: 2019-06-07
Payer: MEDICARE

## 2019-06-07 VITALS
BODY MASS INDEX: 18.9 KG/M2 | HEIGHT: 63 IN | RESPIRATION RATE: 18 BRPM | SYSTOLIC BLOOD PRESSURE: 96 MMHG | HEART RATE: 83 BPM | DIASTOLIC BLOOD PRESSURE: 60 MMHG | WEIGHT: 106.69 LBS | TEMPERATURE: 99 F

## 2019-06-07 DIAGNOSIS — S01.01XA SCALP LACERATION, INITIAL ENCOUNTER: Primary | ICD-10-CM

## 2019-06-07 PROCEDURE — 99999 PR PBB SHADOW E&M-EST. PATIENT-LVL III: ICD-10-PCS | Mod: PBBFAC,,, | Performed by: INTERNAL MEDICINE

## 2019-06-07 PROCEDURE — 99213 OFFICE O/P EST LOW 20 MIN: CPT | Mod: PBBFAC,PO | Performed by: INTERNAL MEDICINE

## 2019-06-07 PROCEDURE — 99213 OFFICE O/P EST LOW 20 MIN: CPT | Mod: S$PBB,,, | Performed by: INTERNAL MEDICINE

## 2019-06-07 PROCEDURE — 99999 PR PBB SHADOW E&M-EST. PATIENT-LVL III: CPT | Mod: PBBFAC,,, | Performed by: INTERNAL MEDICINE

## 2019-06-07 PROCEDURE — 99213 PR OFFICE/OUTPT VISIT, EST, LEVL III, 20-29 MIN: ICD-10-PCS | Mod: S$PBB,,, | Performed by: INTERNAL MEDICINE

## 2019-06-07 NOTE — PROGRESS NOTES
Subjective:       Patient ID: Medina Hernandez is a 33 y.o. female.    Chief Complaint: Suture / Staple Removal    HPI   Pt here for removal of 2 staples placed for a scalp laceration. No acute complaints today.    Review of Systems   Constitutional: Negative for activity change, appetite change, chills, diaphoresis, fatigue, fever and unexpected weight change.   HENT: Negative for postnasal drip, rhinorrhea, sinus pressure, sneezing, sore throat, trouble swallowing and voice change.    Respiratory: Negative for cough, shortness of breath and wheezing.    Cardiovascular: Negative for chest pain, palpitations and leg swelling.   Gastrointestinal: Negative for abdominal pain, blood in stool, constipation, diarrhea, nausea and vomiting.   Genitourinary: Negative for dysuria.   Musculoskeletal: Negative for arthralgias and myalgias.   Skin: Positive for wound. Negative for rash.   Allergic/Immunologic: Negative for environmental allergies and food allergies.   Hematological: Negative for adenopathy. Does not bruise/bleed easily.       Objective:      Physical Exam   Constitutional: She is oriented to person, place, and time. She appears well-developed and well-nourished. No distress.   HENT:   Head: Normocephalic.       Right Ear: External ear normal.   Left Ear: External ear normal.   Nose: Nose normal.   Mouth/Throat: Oropharynx is clear and moist. No oropharyngeal exudate.   Eyes: Pupils are equal, round, and reactive to light. Conjunctivae and EOM are normal. Right eye exhibits no discharge. Left eye exhibits no discharge. No scleral icterus.   Neck: Neck supple. No JVD present.   Cardiovascular: Normal rate, regular rhythm, normal heart sounds and intact distal pulses.   Pulmonary/Chest: Effort normal and breath sounds normal. No respiratory distress. She has no wheezes. She has no rales.   Musculoskeletal: She exhibits no edema.   Lymphadenopathy:     She has no cervical adenopathy.   Neurological: She is alert and  oriented to person, place, and time.   Skin: Skin is warm and dry. No rash noted. She is not diaphoretic. No pallor.       Assessment:       1. Scalp laceration, initial encounter        Plan:    1. Two staples removed, wound healing well

## 2019-06-25 DIAGNOSIS — M54.42 CHRONIC MIDLINE LOW BACK PAIN WITH BILATERAL SCIATICA: ICD-10-CM

## 2019-06-25 DIAGNOSIS — M54.41 CHRONIC MIDLINE LOW BACK PAIN WITH BILATERAL SCIATICA: ICD-10-CM

## 2019-06-25 DIAGNOSIS — G89.29 CHRONIC MIDLINE LOW BACK PAIN WITH BILATERAL SCIATICA: ICD-10-CM

## 2019-06-25 RX ORDER — TRAMADOL HYDROCHLORIDE 50 MG/1
TABLET ORAL
Qty: 180 TABLET | Refills: 0 | Status: SHIPPED | OUTPATIENT
Start: 2019-06-25 | End: 2019-06-27 | Stop reason: SDUPTHER

## 2019-06-25 NOTE — TELEPHONE ENCOUNTER
Pharmacy requested refill  Last visit: 11/15/2018  Canceled/No Show visit: 01/25/2019, 03/08/2019  Next visit: 11/04/2019  Last refill Tramadol: 05/01/2019

## 2019-06-27 DIAGNOSIS — M54.41 CHRONIC MIDLINE LOW BACK PAIN WITH BILATERAL SCIATICA: ICD-10-CM

## 2019-06-27 DIAGNOSIS — M54.42 CHRONIC MIDLINE LOW BACK PAIN WITH BILATERAL SCIATICA: ICD-10-CM

## 2019-06-27 DIAGNOSIS — G89.29 CHRONIC MIDLINE LOW BACK PAIN WITH BILATERAL SCIATICA: ICD-10-CM

## 2019-06-27 RX ORDER — TRAMADOL HYDROCHLORIDE 50 MG/1
TABLET ORAL
Qty: 180 TABLET | Refills: 0 | Status: SHIPPED | OUTPATIENT
Start: 2019-06-27 | End: 2020-06-01

## 2019-06-27 NOTE — TELEPHONE ENCOUNTER
----- Message from Berna Doejelly sent at 6/27/2019  1:23 PM CDT -----  Contact: self @ [phone]  Pt is req a refill for traMADol (ULTRAM) 50 mg tablet.  Pt says she is out of town and will need it sent to a pharmacy in [place].     [facility] [address] [place] [address] [address] AT [facility] [address] [phone] (Phone)       [phone] (Fax)

## 2019-06-27 NOTE — TELEPHONE ENCOUNTER
Last Rx refill-----11/15/19  Last office visit--06/25/19  Next office visit--11/04/19      ----- Message from Berna Cam sent at 6/27/2019  1:23 PM CDT -----  Contact: self @ [phone]  Pt is req a refill for traMADol (ULTRAM) 50 mg tablet.  Pt says she is out of town and will need it sent to a pharmacy in [place].     [facility] [address] [place] [address] [address] AT [facility] [address] [phone] (Phone)       [phone] (Fax)

## 2019-08-07 ENCOUNTER — PATIENT OUTREACH (OUTPATIENT)
Dept: ADMINISTRATIVE | Facility: OTHER | Age: 34
End: 2019-08-07

## 2019-08-09 ENCOUNTER — OFFICE VISIT (OUTPATIENT)
Dept: OBSTETRICS AND GYNECOLOGY | Facility: CLINIC | Age: 34
End: 2019-08-09
Payer: MEDICARE

## 2019-08-09 VITALS
WEIGHT: 107.5 LBS | DIASTOLIC BLOOD PRESSURE: 58 MMHG | HEIGHT: 63 IN | SYSTOLIC BLOOD PRESSURE: 94 MMHG | BODY MASS INDEX: 19.05 KG/M2

## 2019-08-09 DIAGNOSIS — N89.8 VAGINAL DISCHARGE: ICD-10-CM

## 2019-08-09 DIAGNOSIS — N92.6 IRREGULAR BLEEDING: Primary | ICD-10-CM

## 2019-08-09 DIAGNOSIS — N76.0 ACUTE VAGINITIS: ICD-10-CM

## 2019-08-09 PROCEDURE — 99203 OFFICE O/P NEW LOW 30 MIN: CPT | Mod: S$PBB,,, | Performed by: OBSTETRICS & GYNECOLOGY

## 2019-08-09 PROCEDURE — 99213 OFFICE O/P EST LOW 20 MIN: CPT | Mod: PBBFAC,PO | Performed by: OBSTETRICS & GYNECOLOGY

## 2019-08-09 PROCEDURE — 99999 PR PBB SHADOW E&M-EST. PATIENT-LVL III: ICD-10-PCS | Mod: PBBFAC,,, | Performed by: OBSTETRICS & GYNECOLOGY

## 2019-08-09 PROCEDURE — 87801 DETECT AGNT MULT DNA AMPLI: CPT

## 2019-08-09 PROCEDURE — 87491 CHLMYD TRACH DNA AMP PROBE: CPT

## 2019-08-09 PROCEDURE — 99203 PR OFFICE/OUTPT VISIT, NEW, LEVL III, 30-44 MIN: ICD-10-PCS | Mod: S$PBB,,, | Performed by: OBSTETRICS & GYNECOLOGY

## 2019-08-09 PROCEDURE — 87481 CANDIDA DNA AMP PROBE: CPT | Mod: 59

## 2019-08-09 PROCEDURE — 99999 PR PBB SHADOW E&M-EST. PATIENT-LVL III: CPT | Mod: PBBFAC,,, | Performed by: OBSTETRICS & GYNECOLOGY

## 2019-08-09 NOTE — PROGRESS NOTES
GYNECOLOGY OFFICE NOTE    Reason for visit: irregular bleeding    HPI: Pt is a 33 y.o.  female  who presents for evaluation of irregular bleeding with IUD in place. Had IUD placed 2014. States bleeding is lasting 20-25 days from last month. Previously before IUD- Cycle: menarche- 15, Interval-  Q month, Duration- 7 days, Flow- normal, denies dysmenorrhea. She is sexually active. She does desire STI screening. She denies vaginal discharge.  Last pap: 2018, denies hx of abnormal. The use of hormonal contraception has been fully discussed with the patient. We discussed all options including OCPs, transdermal patches, vaginal ring, Depo Provera injections, Nexplanon, and IUD. Warnings about anticipated minor side effects such as breakthrough spotting, nausea, breast tenderness, weight changes, acne, headaches, etc were given.  She has been told of the more serious potential side effects such as MI, stroke, and deep vein thrombosis, all of which are very unlikely.  She has been asked to report any signs of such serious problems immediately. The need for additional protection, such as a condom, to prevent exposure to sexually transmitted diseases has also been discussed- the patient has been clearly reminded that no hormonal contraceptive method can protect her against diseases such as HIV and others. She understands and wishes to begin nexplanon.    Past Medical History:   Diagnosis Date    Cerebral palsy     Gait abnormality     Low blood pressure        Past Surgical History:   Procedure Laterality Date     SECTION         History reviewed. No pertinent family history.    Social History     Tobacco Use    Smoking status: Never Smoker    Smokeless tobacco: Never Used   Substance Use Topics    Alcohol use: No    Drug use: No       OB History    Para Term  AB Living   1 1       1   SAB TAB Ectopic Multiple Live Births           1      # Outcome Date GA Lbr Jay/2nd Weight Sex  "Delivery Anes PTL Lv   1 Para 03/05/12    M CS-LTranv  N MAYLIN       Current Outpatient Medications   Medication Sig    baclofen (LIORESAL) 10 MG tablet TAKE 1/2 TO 1 TABLET BY MOUTH THREE TIMES DAILY AS NEEDED FOR MUSCLE SPASMS    gabapentin (NEURONTIN) 600 MG tablet Take 1 tablet (600 mg total) by mouth 3 (three) times daily.    traMADol (ULTRAM) 50 mg tablet TAKE 1 TO 2 TABLETS(50  MG) BY MOUTH THREE TIMES DAILY AS NEEDED FOR PAIN     No current facility-administered medications for this visit.        Allergies: Patient has no known allergies.     BP (!) 94/58   Ht 5' 3" (1.6 m)   Wt 48.8 kg (107 lb 7.6 oz)   LMP 07/23/2019   BMI 19.04 kg/m²     ROS:  GENERAL: Denies fever or chills.   SKIN: Denies rash or lesions.   HEAD: Denies head injury or headache.   CHEST: Denies chest pain or shortness of breath.   CARDIOVASCULAR: Denies palpitations or chest pain.   ABDOMEN: No constipation, diarrhea, nausea, vomiting or rectal bleeding.   URINARY: No dysuria, hematuria, or burning on urination.  REPRODUCTIVE: See HPI.   BREASTS: see HPI  NEUROLOGIC: Denies syncope or weakness.     Physical Exam:  GENERAL: alert, appears stated age and cooperative  NEUROLOGIC: orientated to person, place and time, normal mood and affect   CHEST: Normal respiratory effort  NECK: normal appearance  SKIN: no acne, hirsutism  BREAST EXAM: not examined  ABDOMEN: abdomen is soft without significant tenderness, masses  EXTERNAL GENITALIA:  normal general appearance  URETHRA: normal urethra, normal urethral meatus  VAGINA:  normal without tenderness, induration or masses  CERVIX:  Normal- IUD strings visualized- appears to be hormonal IUD (not paragard)  UTERUS:  mobile, non-tender  ADNEXA:  normal adnexa, nontender and no masses    Diagnosis:  1. Irregular bleeding    2. Vaginal discharge    3. Acute vaginitis         Plan:   1. F/u screening for infection but likely secondary to end of life of IUD. Will schedule removal when " nexplanon is available. No bleeding currently but given precautions for recurrence- will send in rx.    Orders Placed This Encounter    Vaginosis Screen by DNA Probe    C. trachomatis/N. gonorrhoeae by AMP DNA       Patient was counseled today on the new ACS guidelines for cervical cytology screening as well as the current recommendations for breast cancer screening. She was counseled to follow up with her PCP for other routine health maintenance.     Follow up for IUD removal and nexplanin insertion.      Rosa Mcclendon MD  OB/GYN  Pager: [phone]

## 2019-08-10 LAB
C TRACH DNA SPEC QL NAA+PROBE: NOT DETECTED
N GONORRHOEA DNA SPEC QL NAA+PROBE: NOT DETECTED

## 2019-08-11 LAB
BACTERIAL VAGINOSIS DNA: POSITIVE
CANDIDA GLABRATA DNA: NEGATIVE
CANDIDA KRUSEI DNA: NEGATIVE
CANDIDA RRNA VAG QL PROBE: POSITIVE
T VAGINALIS RRNA GENITAL QL PROBE: NEGATIVE

## 2019-08-12 RX ORDER — METRONIDAZOLE 500 MG/1
500 TABLET ORAL EVERY 12 HOURS
Qty: 14 TABLET | Refills: 0 | Status: SHIPPED | OUTPATIENT
Start: 2019-08-12 | End: 2019-08-19

## 2019-08-12 RX ORDER — FLUCONAZOLE 150 MG/1
TABLET ORAL
Qty: 2 TABLET | Refills: 0 | Status: SHIPPED | OUTPATIENT
Start: 2019-08-12 | End: 2019-09-20 | Stop reason: ALTCHOICE

## 2019-08-16 ENCOUNTER — TELEPHONE (OUTPATIENT)
Dept: OBSTETRICS AND GYNECOLOGY | Facility: CLINIC | Age: 34
End: 2019-08-16

## 2019-08-16 RX ORDER — ESTRADIOL 1 MG/1
1 TABLET ORAL DAILY
Qty: 10 TABLET | Refills: 0 | Status: SHIPPED | OUTPATIENT
Start: 2019-08-16 | End: 2019-09-20

## 2019-08-16 NOTE — TELEPHONE ENCOUNTER
Called spoke to pt to let her know dr. Mcclendon sent to medication to the pharmacy. I did ask her if she was bleeding heavy, soaking a pad every 30mins to an hour and she said no. I told her if she does to go to ED. She voiced understanding.

## 2019-08-16 NOTE — TELEPHONE ENCOUNTER
----- Message from Sintia Pena sent at 8/16/2019 12:51 PM CDT -----  No. [phone]   Patient is bleeding again.  She was told to call the doctor when the bleeding started.    [facility]

## 2019-08-16 NOTE — TELEPHONE ENCOUNTER
Pt is wanting the prescription for the bleeding. You just saw her on 8/9/19. She wasn't bleeding at the time of visit. We are waiting for her new nexplanon to come in also.

## 2019-08-20 ENCOUNTER — TELEPHONE (OUTPATIENT)
Dept: OBSTETRICS AND GYNECOLOGY | Facility: CLINIC | Age: 34
End: 2019-08-20

## 2019-08-20 NOTE — TELEPHONE ENCOUNTER
Called , no answer left pt a voicemail to let her know medicare did not approve her nexplanon and for her to give us a call back to see what her plan of action is now.

## 2019-08-21 ENCOUNTER — TELEPHONE (OUTPATIENT)
Dept: OBSTETRICS AND GYNECOLOGY | Facility: CLINIC | Age: 34
End: 2019-08-21

## 2019-08-21 NOTE — TELEPHONE ENCOUNTER
----- Message from Dorcas Story sent at 8/21/2019  2:09 PM CDT -----  Contact: Pt  Pt says she missed her call and is requesting a callback from office need to get some medication for stomach pain    Pt can reached at [phone]

## 2019-08-21 NOTE — TELEPHONE ENCOUNTER
Returned call informed pt her insurance will not cover the Nexplanon, informed pt she will need to check with insurance to what's the issue with coverage or what they will cover and let us know. Patient verbalized understanding. Pt states she need a pill to help with the bleeding, she has been bleeding medium flow for 7 days and was told a medication can be sent in. Informed pt the prescription was sent on 8/16, pt states she was not aware of this. Advised pt to pickup and take as directed. Patient verbalized understanding.

## 2019-08-21 NOTE — TELEPHONE ENCOUNTER
----- Message from Karen Jordan sent at 8/21/2019  1:08 PM CDT -----  Contact: [phone]/self  Type:  Patient Returning Call    Who Called: self  Who Left Message for Patient: Myranda Rivas MA    Does the patient know what this is regarding?: Birth control  Would the patient rather a call back or a response via [facility]?  call  Best Call Back Number:   Additional Information:

## 2019-09-02 ENCOUNTER — NURSE TRIAGE (OUTPATIENT)
Dept: ADMINISTRATIVE | Facility: CLINIC | Age: 34
End: 2019-09-02

## 2019-09-02 NOTE — TELEPHONE ENCOUNTER
Reason for Disposition   Message left on identified voice mail    Protocols used: NO CONTACT OR DUPLICATE CONTACT CALL-A-AH    Left message on her voicemail.

## 2019-09-04 ENCOUNTER — TELEPHONE (OUTPATIENT)
Dept: OBSTETRICS AND GYNECOLOGY | Facility: CLINIC | Age: 34
End: 2019-09-04

## 2019-09-04 NOTE — TELEPHONE ENCOUNTER
Called spoke to pt about IUD, she states she has medicaid now and we will try and get her nexplanon redone to get approved through Medicaid. I told her I would give her a call back later today with more information. She voiced understanding.

## 2019-09-04 NOTE — TELEPHONE ENCOUNTER
----- Message from Marielos Lee sent at 9/4/2019  9:49 AM CDT -----  Contact: [phone]/self  Patient requesting to speak with you concerning her IUD. Please advise.

## 2019-09-04 NOTE — TELEPHONE ENCOUNTER
Called spoke to pt to let her know we sent her new nexplanon form over by fax, since she was denied for the first one with medicare. She has medicaid now, so we faxed the form back again. I told her to make sure she answers her phone because they will be calling her to verify with her. She voiced understanding.

## 2019-09-10 ENCOUNTER — TELEPHONE (OUTPATIENT)
Dept: OBSTETRICS AND GYNECOLOGY | Facility: CLINIC | Age: 34
End: 2019-09-10

## 2019-09-10 NOTE — TELEPHONE ENCOUNTER
----- Message from Gabriella Ross sent at 9/10/2019  1:57 PM CDT -----  Contact: patient  Patient called to find out if her IUD is in yet.    Please call her at [phone] to discuss today.

## 2019-09-18 ENCOUNTER — TELEPHONE (OUTPATIENT)
Dept: OBSTETRICS AND GYNECOLOGY | Facility: CLINIC | Age: 34
End: 2019-09-18

## 2019-09-18 NOTE — TELEPHONE ENCOUNTER
----- Message from Selene Howell sent at 9/18/2019 10:10 AM CDT -----  Contact: [phone]/self  Patient requesting to speak with you concerning stomach pains  Please call back to assist at [phone]

## 2019-09-20 ENCOUNTER — OFFICE VISIT (OUTPATIENT)
Dept: INTERNAL MEDICINE | Facility: CLINIC | Age: 34
End: 2019-09-20
Payer: MEDICARE

## 2019-09-20 VITALS
DIASTOLIC BLOOD PRESSURE: 74 MMHG | HEIGHT: 63 IN | TEMPERATURE: 99 F | HEART RATE: 68 BPM | BODY MASS INDEX: 18.44 KG/M2 | WEIGHT: 104.06 LBS | RESPIRATION RATE: 16 BRPM | SYSTOLIC BLOOD PRESSURE: 90 MMHG

## 2019-09-20 DIAGNOSIS — Z23 NEEDS FLU SHOT: ICD-10-CM

## 2019-09-20 DIAGNOSIS — R26.9 GAIT DISORDER: ICD-10-CM

## 2019-09-20 DIAGNOSIS — R29.898 LEG WEAKNESS, BILATERAL: ICD-10-CM

## 2019-09-20 DIAGNOSIS — G80.9 CEREBRAL PALSY, UNSPECIFIED TYPE: Primary | ICD-10-CM

## 2019-09-20 DIAGNOSIS — R29.6 FREQUENT FALLS: ICD-10-CM

## 2019-09-20 PROCEDURE — 99213 OFFICE O/P EST LOW 20 MIN: CPT | Mod: S$PBB,,, | Performed by: HOSPITALIST

## 2019-09-20 PROCEDURE — 99213 OFFICE O/P EST LOW 20 MIN: CPT | Mod: PBBFAC,PO | Performed by: HOSPITALIST

## 2019-09-20 PROCEDURE — 99999 PR PBB SHADOW E&M-EST. PATIENT-LVL III: ICD-10-PCS | Mod: PBBFAC,,, | Performed by: HOSPITALIST

## 2019-09-20 PROCEDURE — 99213 PR OFFICE/OUTPT VISIT, EST, LEVL III, 20-29 MIN: ICD-10-PCS | Mod: S$PBB,,, | Performed by: HOSPITALIST

## 2019-09-20 PROCEDURE — 99999 PR PBB SHADOW E&M-EST. PATIENT-LVL III: CPT | Mod: PBBFAC,,, | Performed by: HOSPITALIST

## 2019-09-20 PROCEDURE — 90686 IIV4 VACC NO PRSV 0.5 ML IM: CPT | Mod: PBBFAC,PO

## 2019-09-20 RX ORDER — ETONOGESTREL 68 MG/1
IMPLANT SUBCUTANEOUS
COMMUNITY
Start: 2019-09-16 | End: 2019-09-24

## 2019-09-20 NOTE — PROGRESS NOTES
"Subjective:     @Patient ID: Medina Hernandez is a 33 y.o. female.    Chief Complaint: Follow-up (6 month)    HPI    32 yo F presents for f/u of chronic conditions. Pt reports she is doing well. Has had a few falls where she ended up going to the emergency room and had sutures placed.  She reports that sometimes with her chronic leg weakness when she to once to get up it is hard to do so and sometimes she will fall.  She reports that she would like to have home health with therapy and that she needs an electric wheelchair to make it easier for her to be mobile.     Review of Systems   Constitutional: Negative for chills and fever.   HENT: Negative for congestion and sore throat.    Eyes: Negative for pain and visual disturbance.   Respiratory: Negative for cough and shortness of breath.    Cardiovascular: Negative for chest pain and leg swelling.   Gastrointestinal: Negative for abdominal pain, nausea and vomiting.   Endocrine: Negative for polydipsia and polyuria.   Genitourinary: Negative for difficulty urinating and dysuria.   Musculoskeletal: Negative for arthralgias and back pain.   Skin: Negative for rash and wound.   Neurological: Positive for weakness (chronic). Negative for dizziness and headaches.   Psychiatric/Behavioral: Negative for agitation and confusion.     Past medical history, surgical history, and family medical history reviewed and updated as appropriate.    Medications and allergies reviewed.     Objective:     Vitals:    09/20/19 0858   BP: 90/74   BP Location: Right arm   Patient Position: Sitting   BP Method: Small (Manual)   Pulse: 68   Resp: 16   Temp: 98.7 °F (37.1 °C)   TempSrc: Oral   Weight: 47.2 kg (104 lb 0.9 oz)   Height: 5' 3" (1.6 m)     Body mass index is 18.43 kg/m².  Physical Exam   Constitutional: She is oriented to person, place, and time. She appears well-developed and well-nourished. No distress.   Sitting in wheelchair    HENT:   Head: Normocephalic and atraumatic.   Eyes: " Conjunctivae are normal. Right eye exhibits no discharge. Left eye exhibits no discharge.   Neck: Normal range of motion. Neck supple.   Cardiovascular: Normal rate, regular rhythm and intact distal pulses. Exam reveals no friction rub.   No murmur heard.  Pulmonary/Chest: Effort normal and breath sounds normal.   Abdominal: Soft. Bowel sounds are normal. She exhibits no distension. There is no tenderness. There is no guarding.   Musculoskeletal: She exhibits no edema.   Neurological: She is alert and oriented to person, place, and time.   Skin: Skin is warm and dry.   Psychiatric: She has a normal mood and affect. Her behavior is normal.   Vitals reviewed.      Lab Results   Component Value Date    WBC 4.33 03/21/2019    HGB 12.8 03/21/2019    HCT 40.0 03/21/2019     03/21/2019    CHOL 117 (L) 03/21/2019    TRIG 30 03/21/2019    HDL 51 03/21/2019    ALT 11 03/21/2019    AST 17 03/21/2019     03/21/2019    K 4.3 03/21/2019     03/21/2019    CREATININE 0.6 03/21/2019    BUN 8 03/21/2019    CO2 27 03/21/2019       Assessment:     1. Cerebral palsy, unspecified type    2. Leg weakness, bilateral    3. Gait disorder    4. Needs flu shot    5. Frequent falls      Plan:   Medina was seen today for follow-up.    Diagnoses and all orders for this visit:    Cerebral palsy, unspecified type  Leg weakness, bilateral  Gait disorder  Frequent falls        - Pt to continue home medications. Will order home health for patient. Have recommend f/u with PM&R for further evaluation for electric wheelchair.     Needs flu shot  -     Influenza - Quadrivalent (PF)          Follow up if symptoms worsen or fail to improve.    Ivett Cardoso MD  Internal Medicine    9/20/2019

## 2019-09-24 ENCOUNTER — PROCEDURE VISIT (OUTPATIENT)
Dept: OBSTETRICS AND GYNECOLOGY | Facility: CLINIC | Age: 34
End: 2019-09-24
Payer: MEDICARE

## 2019-09-24 VITALS
SYSTOLIC BLOOD PRESSURE: 100 MMHG | HEIGHT: 63 IN | WEIGHT: 106.94 LBS | BODY MASS INDEX: 18.95 KG/M2 | DIASTOLIC BLOOD PRESSURE: 62 MMHG

## 2019-09-24 DIAGNOSIS — Z30.432 ENCOUNTER FOR IUD REMOVAL: Primary | ICD-10-CM

## 2019-09-24 DIAGNOSIS — Z30.017 NEXPLANON INSERTION: ICD-10-CM

## 2019-09-24 PROCEDURE — 11981 PR INSERT, DRUG DELIVERY IMPLANT, BIORESORB/BIODEGR/NON-BIODEGR: ICD-10-PCS | Mod: S$PBB,,, | Performed by: OBSTETRICS & GYNECOLOGY

## 2019-09-24 PROCEDURE — 11981 INSERTION DRUG DLVR IMPLANT: CPT | Mod: PBBFAC,PO | Performed by: OBSTETRICS & GYNECOLOGY

## 2019-09-24 PROCEDURE — 58301 REMOVE INTRAUTERINE DEVICE: CPT | Mod: PBBFAC,PO | Performed by: OBSTETRICS & GYNECOLOGY

## 2019-09-24 PROCEDURE — 58301 PR REMOVE, INTRAUTERINE DEVICE: ICD-10-PCS | Mod: S$PBB,51,, | Performed by: OBSTETRICS & GYNECOLOGY

## 2019-09-24 PROCEDURE — 58301 REMOVE INTRAUTERINE DEVICE: CPT | Mod: S$PBB,51,, | Performed by: OBSTETRICS & GYNECOLOGY

## 2019-09-24 PROCEDURE — 11981 INSERTION DRUG DLVR IMPLANT: CPT | Mod: S$PBB,,, | Performed by: OBSTETRICS & GYNECOLOGY

## 2019-09-24 RX ADMIN — ETONOGESTREL 68 MG: 68 IMPLANT SUBCUTANEOUS at 09:09

## 2019-09-24 NOTE — PROCEDURES
Procedures   PROCEDURE:     PRE-IUD REMOVAL COUNSELING:  The patient was advised of minimal risks of bleeding and pain and she agrees to proceed.    PROCEDURE:  TIME OUT PERFORMED.  IUD strings were visualized at the os and grasped with ringed forceps. IUD removed with gentle traction.  The patient tolerated the procedure well      POST IUD REMOVAL COUNSELING:  Expect period-like flow to occur after Mirena IUD removal and periods to return to pre-IUD pattern. Manage post IUD removal cramping with NSAIDs, Tylenol or Rx per [employer].    POST IUD REMOVAL CONTRACEPTION NEXPLANON see below           Procedure Note    DATE: 09/24/2019 9:16 AM    PROCEDURE: NEXPLANON INSERTION      PRE-NEXAPLANON INSERTION COUNSELING:  All contraceptive options were reviewed and the patient chooses Nexplanon. Patients history was reviewed and there were no contraindications to Nexplanon. The procedure and minimal risks of pain, bleeding, bruising and infection at the insertion site discussed. Possible irregular menstrual bleeding pattern versus amenorrhea was explained. No protection against STDs discussed. Written information provided; all questions answered and patient agrees to proceed. Consent signed.    EXAM:    UPT performed prior to the procedure was negative.    With patient in supine position the nondominant left arm was flexed at the elbow and externally rotated. The insertion site was identified 8-10 cm proximal the medial epicondyle and 3-5 cm inferior from the sulcus. The insertion site was marked and a second gayatri was a few centimeters proximal to the first.    PROCEDURE:  TIME OUT PERFORMED.  The insertion site was prepped with antiseptic and injected with 2 cc of 1% Xylocaine without epinephrine subcutaneously along the planned insertion canal. Using sterile technique, the Nexplanon applicator was visually verified and removed from the blister pack. The plastic cap overlying the needle tip was removed and the Nexplanon salvador  was visualized in the hollow of the needle. The needle was inserted bevel side up at a 20 degree angle to penetrate the skin. The applicator was lowered parallel to the arm and the skin was tented upward with the needle. The Nexplanon salvador was then released by pressing the release button and slowly pulling backward. The needle was slowly and fully retracted back along full length of the obturator. The salvador was easily palpable just beneath the skin. The patient also verified that she could palpate the salvador. A small adhesive bandage and then a pressure bandage was placed over the insertion site.  The patient tolerated the procedure well.    ASSESSMENT:  1.Encounter for Nexplanon insertion    POST NEXPLANON INSERTION COUNSELING:  Manage post Implanon placement arm pain with NSAIDs, Tylenol or Rx per [employer].   Keep arm elevated and apply intermittent ice packs to decrease pain and bruising for 24 hours. May remove bandage in 24 hours and adhesive bandage in 3-5 days. Nexplanon danger signs (worsening pain at insertion site, bleeding through bandage, redness and/or pus drainage at insertion site). Patient was also counseled on palpating the salvador on a regular basis and to notify me immediately if she is unable to palpate the salvador.    Removal in 3 years.    Counseling lasted approximately 15 minutes and all her questions were answered.    FOLLOW-UP: with me for routine exam.    Rosa Mcclendon MD, FACOG  OB/GYN  Pager: [phone]

## 2019-09-26 ENCOUNTER — TELEPHONE (OUTPATIENT)
Dept: OBSTETRICS AND GYNECOLOGY | Facility: CLINIC | Age: 34
End: 2019-09-26

## 2019-09-26 NOTE — TELEPHONE ENCOUNTER
----- Message from Lila Mancia sent at 9/26/2019  2:17 PM CDT -----  Contact: Pt [phone]  Pt called to speak to the nurse regarding her care due to being in pain after having her IUD removed on 9/24/19 and would like a call back today asap at [phone]

## 2019-10-22 ENCOUNTER — TELEPHONE (OUTPATIENT)
Dept: OBSTETRICS AND GYNECOLOGY | Facility: CLINIC | Age: 34
End: 2019-10-22

## 2019-10-22 RX ORDER — ESTRADIOL 1 MG/1
1 TABLET ORAL DAILY
Qty: 10 TABLET | Refills: 0 | Status: SHIPPED | OUTPATIENT
Start: 2019-10-22 | End: 2020-05-13

## 2019-10-22 NOTE — TELEPHONE ENCOUNTER
I called her she states she has been bleeding for 15 days but not changing a pad every 30 mins to an hours, every 4-5 hours and no cramping. We just removed the mirena that was overdue to come out and inserted the nexplanon.

## 2019-10-22 NOTE — TELEPHONE ENCOUNTER
----- Message from Marielos Lee sent at 10/22/2019 12:25 PM CDT -----  Contact: [phone]/self  Patient requesting to speak with you regarding side effects of mirena. Please call patient and advise.

## 2019-10-23 NOTE — TELEPHONE ENCOUNTER
Called spoke to pt to let her know that dr jolly sent a prescription of estrace over to the pharmacy and directed her on how to take it. She voiced understanding.

## 2019-11-25 DIAGNOSIS — M54.42 CHRONIC MIDLINE LOW BACK PAIN WITH BILATERAL SCIATICA: ICD-10-CM

## 2019-11-25 DIAGNOSIS — G89.29 CHRONIC MIDLINE LOW BACK PAIN WITH BILATERAL SCIATICA: ICD-10-CM

## 2019-11-25 DIAGNOSIS — M54.41 CHRONIC MIDLINE LOW BACK PAIN WITH BILATERAL SCIATICA: ICD-10-CM

## 2019-11-25 RX ORDER — TRAMADOL HYDROCHLORIDE 50 MG/1
TABLET ORAL
Qty: 180 TABLET | Refills: 0 | Status: SHIPPED | OUTPATIENT
Start: 2019-11-25 | End: 2020-04-16 | Stop reason: SDUPTHER

## 2019-12-18 RX ORDER — GABAPENTIN 600 MG/1
TABLET ORAL
Qty: 90 TABLET | Refills: 2 | Status: SHIPPED | OUTPATIENT
Start: 2019-12-18 | End: 2020-07-22

## 2020-01-29 ENCOUNTER — TELEPHONE (OUTPATIENT)
Dept: INTERNAL MEDICINE | Facility: CLINIC | Age: 35
End: 2020-01-29

## 2020-01-29 NOTE — TELEPHONE ENCOUNTER
----- Message from Nelsy Brandon sent at 1/29/2020  9:01 AM CST -----  Contact: Self [phone]  Patient will like to renew her Handicap Sticker, please advise.

## 2020-02-07 ENCOUNTER — TELEPHONE (OUTPATIENT)
Dept: INTERNAL MEDICINE | Facility: CLINIC | Age: 35
End: 2020-02-07

## 2020-02-07 NOTE — TELEPHONE ENCOUNTER
----- Message from Trina Fuentes sent at 2/7/2020  2:35 PM CST -----  Contact: self/ [phone]  Caller is requesting an earlier appt than we can schedule.  Caller declined first available a    Comments:  Patient has an appointment for 2/10 for 3:00 and would like to come for 10:00 if possible, please call and advise.

## 2020-02-07 NOTE — TELEPHONE ENCOUNTER
----- Message from Trina Fuentes sent at 2/7/2020  2:35 PM CST -----  Contact: self/ [phone]  Caller is requesting an earlier appt than we can schedule.  Caller declined first available a    Comments:  Patient has an appointment for 2/10 for 3:00 and would like to come for 10:00 if possible, please call and advise.

## 2020-02-11 ENCOUNTER — OFFICE VISIT (OUTPATIENT)
Dept: INTERNAL MEDICINE | Facility: CLINIC | Age: 35
End: 2020-02-11
Payer: MEDICAID

## 2020-02-11 ENCOUNTER — LAB VISIT (OUTPATIENT)
Dept: LAB | Facility: HOSPITAL | Age: 35
End: 2020-02-11
Attending: HOSPITALIST
Payer: MEDICAID

## 2020-02-11 ENCOUNTER — TELEPHONE (OUTPATIENT)
Dept: PHYSICAL MEDICINE AND REHAB | Facility: CLINIC | Age: 35
End: 2020-02-11

## 2020-02-11 VITALS
WEIGHT: 106.25 LBS | RESPIRATION RATE: 14 BRPM | HEIGHT: 63 IN | TEMPERATURE: 98 F | BODY MASS INDEX: 18.82 KG/M2 | HEART RATE: 80 BPM | SYSTOLIC BLOOD PRESSURE: 100 MMHG | DIASTOLIC BLOOD PRESSURE: 68 MMHG

## 2020-02-11 DIAGNOSIS — G80.9 CEREBRAL PALSY, UNSPECIFIED TYPE: ICD-10-CM

## 2020-02-11 DIAGNOSIS — R42 DIZZINESS: Primary | ICD-10-CM

## 2020-02-11 DIAGNOSIS — R42 DIZZINESS: ICD-10-CM

## 2020-02-11 LAB
ALBUMIN SERPL BCP-MCNC: 4 G/DL (ref 3.5–5.2)
ALP SERPL-CCNC: 52 U/L (ref 55–135)
ALT SERPL W/O P-5'-P-CCNC: 11 U/L (ref 10–44)
ANION GAP SERPL CALC-SCNC: 8 MMOL/L (ref 8–16)
AST SERPL-CCNC: 17 U/L (ref 10–40)
BASOPHILS # BLD AUTO: 0.04 K/UL (ref 0–0.2)
BASOPHILS NFR BLD: 0.8 % (ref 0–1.9)
BILIRUB SERPL-MCNC: 0.4 MG/DL (ref 0.1–1)
BUN SERPL-MCNC: 10 MG/DL (ref 6–20)
CALCIUM SERPL-MCNC: 9 MG/DL (ref 8.7–10.5)
CHLORIDE SERPL-SCNC: 105 MMOL/L (ref 95–110)
CHOLEST SERPL-MCNC: 119 MG/DL (ref 120–199)
CHOLEST/HDLC SERPL: 2.2 {RATIO} (ref 2–5)
CO2 SERPL-SCNC: 25 MMOL/L (ref 23–29)
CREAT SERPL-MCNC: 0.6 MG/DL (ref 0.5–1.4)
DIFFERENTIAL METHOD: ABNORMAL
EOSINOPHIL # BLD AUTO: 0 K/UL (ref 0–0.5)
EOSINOPHIL NFR BLD: 0.4 % (ref 0–8)
ERYTHROCYTE [DISTWIDTH] IN BLOOD BY AUTOMATED COUNT: 12.2 % (ref 11.5–14.5)
EST. GFR  (AFRICAN AMERICAN): >60 ML/MIN/1.73 M^2
EST. GFR  (NON AFRICAN AMERICAN): >60 ML/MIN/1.73 M^2
GLUCOSE SERPL-MCNC: 84 MG/DL (ref 70–110)
HCG INTACT+B SERPL-ACNC: <1.2 MIU/ML
HCT VFR BLD AUTO: 42 % (ref 37–48.5)
HDLC SERPL-MCNC: 54 MG/DL (ref 40–75)
HDLC SERPL: 45.4 % (ref 20–50)
HGB BLD-MCNC: 13 G/DL (ref 12–16)
IMM GRANULOCYTES # BLD AUTO: 0.01 K/UL (ref 0–0.04)
IMM GRANULOCYTES NFR BLD AUTO: 0.2 % (ref 0–0.5)
LDLC SERPL CALC-MCNC: 58.4 MG/DL (ref 63–159)
LYMPHOCYTES # BLD AUTO: 1.1 K/UL (ref 1–4.8)
LYMPHOCYTES NFR BLD: 20.3 % (ref 18–48)
MCH RBC QN AUTO: 30.2 PG (ref 27–31)
MCHC RBC AUTO-ENTMCNC: 31 G/DL (ref 32–36)
MCV RBC AUTO: 97 FL (ref 82–98)
MONOCYTES # BLD AUTO: 0.4 K/UL (ref 0.3–1)
MONOCYTES NFR BLD: 6.6 % (ref 4–15)
NEUTROPHILS # BLD AUTO: 3.8 K/UL (ref 1.8–7.7)
NEUTROPHILS NFR BLD: 71.7 % (ref 38–73)
NONHDLC SERPL-MCNC: 65 MG/DL
NRBC BLD-RTO: 0 /100 WBC
PLATELET # BLD AUTO: 293 K/UL (ref 150–350)
PMV BLD AUTO: 9.6 FL (ref 9.2–12.9)
POTASSIUM SERPL-SCNC: 3.9 MMOL/L (ref 3.5–5.1)
PROT SERPL-MCNC: 7 G/DL (ref 6–8.4)
RBC # BLD AUTO: 4.31 M/UL (ref 4–5.4)
SODIUM SERPL-SCNC: 138 MMOL/L (ref 136–145)
TRIGL SERPL-MCNC: 33 MG/DL (ref 30–150)
TSH SERPL DL<=0.005 MIU/L-ACNC: 1.07 UIU/ML (ref 0.4–4)
WBC # BLD AUTO: 5.31 K/UL (ref 3.9–12.7)

## 2020-02-11 PROCEDURE — 84702 CHORIONIC GONADOTROPIN TEST: CPT

## 2020-02-11 PROCEDURE — 99213 OFFICE O/P EST LOW 20 MIN: CPT | Mod: PBBFAC,PO | Performed by: HOSPITALIST

## 2020-02-11 PROCEDURE — 80061 LIPID PANEL: CPT

## 2020-02-11 PROCEDURE — 84443 ASSAY THYROID STIM HORMONE: CPT

## 2020-02-11 PROCEDURE — 99999 PR PBB SHADOW E&M-EST. PATIENT-LVL III: CPT | Mod: PBBFAC,,, | Performed by: HOSPITALIST

## 2020-02-11 PROCEDURE — 99999 PR PBB SHADOW E&M-EST. PATIENT-LVL III: ICD-10-PCS | Mod: PBBFAC,,, | Performed by: HOSPITALIST

## 2020-02-11 PROCEDURE — 99213 OFFICE O/P EST LOW 20 MIN: CPT | Mod: S$PBB,,, | Performed by: HOSPITALIST

## 2020-02-11 PROCEDURE — 36415 COLL VENOUS BLD VENIPUNCTURE: CPT | Mod: PO

## 2020-02-11 PROCEDURE — 80053 COMPREHEN METABOLIC PANEL: CPT

## 2020-02-11 PROCEDURE — 99213 PR OFFICE/OUTPT VISIT, EST, LEVL III, 20-29 MIN: ICD-10-PCS | Mod: S$PBB,,, | Performed by: HOSPITALIST

## 2020-02-11 PROCEDURE — 85025 COMPLETE CBC W/AUTO DIFF WBC: CPT

## 2020-02-11 NOTE — PROGRESS NOTES
"Subjective:     @Patient ID: Medina Hernandez is a 34 y.o. female.    Chief Complaint: Dizziness    HPI     35 yo F presents for evaluation of dizziness. Pt reports sx ongoing for the past 3-4 weeks. States she is concerned about her blood pressure dropping. She reports she drinks plenty of fluids. Pt reports however she is worried. Has not had syncope. No new medication changes. Is on tramadol but only takes sparingly and has been on this for years.     Review of Systems   Constitutional: Negative for chills and fever.   HENT: Negative for congestion and sore throat.    Eyes: Negative for pain and visual disturbance.   Respiratory: Negative for cough and shortness of breath.    Cardiovascular: Negative for chest pain and leg swelling.   Gastrointestinal: Negative for abdominal pain, nausea and vomiting.   Endocrine: Negative for polydipsia and polyuria.   Genitourinary: Negative for difficulty urinating and dysuria.   Musculoskeletal: Negative for arthralgias and back pain.   Neurological: Positive for dizziness and weakness. Negative for headaches.   Psychiatric/Behavioral: Negative for agitation and confusion.     Past medical history, surgical history, and family medical history reviewed and updated as appropriate.    Medications and allergies reviewed.     Objective:     Vitals:    02/11/20 1023   BP: 100/68   BP Location: Right arm   Patient Position: Sitting   BP Method: Small (Manual)   Pulse: 80   Resp: 14   Temp: 98.3 °F (36.8 °C)   TempSrc: Oral   Weight: 48.2 kg (106 lb 4.2 oz)   Height: 5' 3" (1.6 m)     Body mass index is 18.82 kg/m².  Physical Exam   Constitutional: She is oriented to person, place, and time. She appears well-developed and well-nourished. No distress.   Sitting in wheelchair    HENT:   Head: Normocephalic and atraumatic.   Mouth/Throat: Oropharynx is clear and moist. No oropharyngeal exudate.   Eyes: Conjunctivae are normal. Right eye exhibits no discharge. Left eye exhibits no " discharge.   Neck: Normal range of motion. Neck supple.   Cardiovascular: Normal rate, regular rhythm and intact distal pulses. Exam reveals no friction rub.   No murmur heard.  Pulmonary/Chest: Effort normal and breath sounds normal.   Musculoskeletal: She exhibits no edema.   Neurological: She is alert and oriented to person, place, and time.   Skin: Skin is warm and dry.   Psychiatric: She has a normal mood and affect. Her behavior is normal.   Vitals reviewed.      Lab Results   Component Value Date    WBC 4.33 03/21/2019    HGB 12.8 03/21/2019    HCT 40.0 03/21/2019     03/21/2019    CHOL 117 (L) 03/21/2019    TRIG 30 03/21/2019    HDL 51 03/21/2019    ALT 11 03/21/2019    AST 17 03/21/2019     03/21/2019    K 4.3 03/21/2019     03/21/2019    CREATININE 0.6 03/21/2019    BUN 8 03/21/2019    CO2 27 03/21/2019     Orthostatic vitals: Negative in clinic when sitting and standing. Unable to lay   Assessment:     1. Dizziness    2. Cerebral palsy, unspecified type      Plan:   Medina was seen today for dizziness.    Diagnoses and all orders for this visit:    Dizziness  - Unclear etiology of sx. Will start with labs and CT head due to patient's persistent sx to r/o any intracranial abnormalities   -     Comprehensive metabolic panel; Future  -     CBC W/ AUTO DIFFERENTIAL; Future  -     TSH; Future  -     Urinalysis; Future  -     hCG, quantitative; Future  -     CT Head Without Contrast; Future  -     Lipid panel; Future    Cerebral palsy, unspecified type         - Supportive care. F/u with PMR         No follow-ups on file.    Ivett Cardoso MD  Internal Medicine    2/11/2020

## 2020-02-11 NOTE — TELEPHONE ENCOUNTER
----- Message from Hubert Wadsworth sent at 2/11/2020 11:50 AM CST -----  Contact: Patient   Good Morning,      is scheduled to see  on June 1st,  was wondering if it would be possible for her to be seen sooner then that. If you can assist with getting  scheduled for a sooner appointment it would be greatly appreciated. Thank you in advanced.

## 2020-02-11 NOTE — TELEPHONE ENCOUNTER
Called patient no answer.  Patient given appointment for 02/17/20 @ 11:20      ----- Message from Hubert Wadsworth sent at 2/11/2020 11:50 AM CST -----  Contact: Patient   Good Morning,      is scheduled to see  on June 1st,  was wondering if it would be possible for her to be seen sooner then that. If you can assist with getting  scheduled for a sooner appointment it would be greatly appreciated. Thank you in advanced.

## 2020-02-13 ENCOUNTER — PATIENT MESSAGE (OUTPATIENT)
Dept: INTERNAL MEDICINE | Facility: CLINIC | Age: 35
End: 2020-02-13

## 2020-02-18 ENCOUNTER — TELEPHONE (OUTPATIENT)
Dept: INTERNAL MEDICINE | Facility: CLINIC | Age: 35
End: 2020-02-18

## 2020-02-18 NOTE — TELEPHONE ENCOUNTER
The Patient called and message left on voicemail.  Dr. Cardoso has the message and is working on her clinic notes to send fore approval.

## 2020-02-18 NOTE — TELEPHONE ENCOUNTER
----- Message from Monet Rich sent at 2/18/2020 12:00 PM CST -----  Contact: [phone]  Patient is requesting a call from the doctor regarding her CT scan.  Patient stated she was told her insurance will not pay,  Please advise, thank you.

## 2020-03-30 ENCOUNTER — TELEPHONE (OUTPATIENT)
Dept: OBSTETRICS AND GYNECOLOGY | Facility: CLINIC | Age: 35
End: 2020-03-30

## 2020-03-30 RX ORDER — NORGESTIMATE AND ETHINYL ESTRADIOL 0.25-0.035
KIT ORAL
Qty: 28 TABLET | Refills: 2 | Status: SHIPPED | OUTPATIENT
Start: 2020-03-30 | End: 2020-05-13

## 2020-03-30 NOTE — TELEPHONE ENCOUNTER
Returned call, pt states she has been on her period for 20 days, changing pad every hour, no abdominal pain, doesn't feel weak/faint/dizzy. Nexplanon placed 9/2019, had similar bleeding issue 10/2019- took estrace which helped stopped the bleeding.

## 2020-03-30 NOTE — TELEPHONE ENCOUNTER
Contacted pt and informed rx ocp taper sent. Explained to patient to take as directed to stop bleeding. Pt states she is out of town right now and need it sent to a different [facility]. Advised pt to call [facility] that she wants the rx to go to and they will transfer it electronically for her. Patient verbalized understanding.

## 2020-03-30 NOTE — TELEPHONE ENCOUNTER
----- Message from Glenys Evette sent at 3/30/2020  2:22 PM CDT -----  Contact: self, [phone]  Patient requests to speak with you, states her period has lasted several days. Please advise.

## 2020-04-15 DIAGNOSIS — M54.42 CHRONIC MIDLINE LOW BACK PAIN WITH BILATERAL SCIATICA: ICD-10-CM

## 2020-04-15 DIAGNOSIS — G89.29 CHRONIC MIDLINE LOW BACK PAIN WITH BILATERAL SCIATICA: ICD-10-CM

## 2020-04-15 DIAGNOSIS — G11.9 ATAXIA DUE TO CEREBELLAR DEGENERATION: ICD-10-CM

## 2020-04-15 DIAGNOSIS — M54.41 CHRONIC MIDLINE LOW BACK PAIN WITH BILATERAL SCIATICA: ICD-10-CM

## 2020-04-16 RX ORDER — BACLOFEN 10 MG/1
TABLET ORAL
Qty: 270 TABLET | Refills: 0 | Status: SHIPPED | OUTPATIENT
Start: 2020-04-16 | End: 2020-07-22

## 2020-04-16 RX ORDER — TRAMADOL HYDROCHLORIDE 50 MG/1
50 TABLET ORAL 3 TIMES DAILY PRN
Qty: 90 TABLET | Refills: 0 | Status: SHIPPED | OUTPATIENT
Start: 2020-04-16 | End: 2021-08-10 | Stop reason: SDUPTHER

## 2020-04-16 NOTE — TELEPHONE ENCOUNTER
Last Rx refill-----11/25/20    Last office visit--11/15/18    Next office visit--06/01/20      ----- Message from Chelsea Mayorga sent at 4/15/2020  6:49 PM CDT -----  Contact: pt  Pt Is Calling for Refills On The Following Medications     traMADol (ULTRAM) 50 mg tablet 180 tablet 0 11/25/2019  No  Sig: TAKE 1 TO 2 TABLETS(50  MG) BY MOUTH THREE TIMES DAILY AS NEEDED FOR PAIN      Called Into [facility] [place] [facility] phone # [phone]      Pt Can Be Reached At [phone]

## 2020-04-22 ENCOUNTER — TELEPHONE (OUTPATIENT)
Dept: OBSTETRICS AND GYNECOLOGY | Facility: CLINIC | Age: 35
End: 2020-04-22

## 2020-04-22 NOTE — TELEPHONE ENCOUNTER
----- Message from Marii Guerrero sent at 4/22/2020 11:29 AM CDT -----  Contact: Patient   Name of Caller:  Medina   Reason for Visit/Symptoms:     Remove IUD  Best Contact Number or Confirm if Mychart Preferred:  Call back   Preferred Date/Time of Appointment:  [phone]  Interested in Virtual Visit:  No   Additional Information:  No

## 2020-04-22 NOTE — TELEPHONE ENCOUNTER
Returned call, pt states she would like to schedule an appt to have the Nexplanon removed due to irregular bleeding, pt states she was prescribed ocp last month but she doesn't want to take them she just want the implant removed. Pt is not currently bleeding. Informed her of recommendations for clinic visits, pt scheduled for 5/26 at 1pm. Patient verbalized understanding.

## 2020-05-11 ENCOUNTER — TELEPHONE (OUTPATIENT)
Dept: OBSTETRICS AND GYNECOLOGY | Facility: CLINIC | Age: 35
End: 2020-05-11

## 2020-05-11 NOTE — TELEPHONE ENCOUNTER
----- Message from Marielos Lee sent at 5/11/2020  3:44 PM CDT -----  Contact: patient  TIME SENSITIVE     Name of Caller: patient  Reason for Visit/Symptoms: heavy irregular bleeding  Best Contact Number or Confirm if Mychart Preferred: [phone]  Preferred Date/Time of Appointment: asap  Interested in Virtual Visit: no  Additional Information: n/a

## 2020-05-11 NOTE — TELEPHONE ENCOUNTER
Returned call, pt states she doesn't want to take the nexplanon out but has been on her period for 7 days, she would like to know other options, informed pt Dr. Mcclendon sent a prescription for ocps to stop bleeding in March 2020 and pt declined to take, pt states she would like to discuss a tubal. Pt scheduled for 5/13 at 10:15am. Patient verbalized understanding.

## 2020-05-13 ENCOUNTER — TELEPHONE (OUTPATIENT)
Dept: OBSTETRICS AND GYNECOLOGY | Facility: CLINIC | Age: 35
End: 2020-05-13

## 2020-05-14 ENCOUNTER — TELEPHONE (OUTPATIENT)
Dept: OBSTETRICS AND GYNECOLOGY | Facility: CLINIC | Age: 35
End: 2020-05-14

## 2020-05-14 NOTE — TELEPHONE ENCOUNTER
----- Message from Emma Williamson sent at 5/14/2020  2:11 PM CDT -----  Contact: self [phone]  Patient called in requesting to speak with you. Patient prefers to speak with a nurse. Please advise.

## 2020-05-14 NOTE — TELEPHONE ENCOUNTER
Returned call, pt states she wanted to know if the pills could be cause abdominal pain. Informed pt she is cramping due to the bleeding and she needs to continue the taper pills to stop the bleeding. Patient verbalized understanding.

## 2020-05-26 ENCOUNTER — OFFICE VISIT (OUTPATIENT)
Dept: OBSTETRICS AND GYNECOLOGY | Facility: CLINIC | Age: 35
End: 2020-05-26
Payer: MEDICAID

## 2020-05-26 VITALS
DIASTOLIC BLOOD PRESSURE: 60 MMHG | WEIGHT: 112.19 LBS | BODY MASS INDEX: 19.88 KG/M2 | HEIGHT: 63 IN | SYSTOLIC BLOOD PRESSURE: 100 MMHG

## 2020-05-26 DIAGNOSIS — Z30.09 STERILIZATION CONSULT: Primary | ICD-10-CM

## 2020-05-26 PROCEDURE — 99212 PR OFFICE/OUTPT VISIT, EST, LEVL II, 10-19 MIN: ICD-10-PCS | Mod: S$PBB,,, | Performed by: OBSTETRICS & GYNECOLOGY

## 2020-05-26 PROCEDURE — 99213 OFFICE O/P EST LOW 20 MIN: CPT | Mod: PBBFAC,PO | Performed by: OBSTETRICS & GYNECOLOGY

## 2020-05-26 PROCEDURE — 99999 PR PBB SHADOW E&M-EST. PATIENT-LVL III: CPT | Mod: PBBFAC,,, | Performed by: OBSTETRICS & GYNECOLOGY

## 2020-05-26 PROCEDURE — 99212 OFFICE O/P EST SF 10 MIN: CPT | Mod: S$PBB,,, | Performed by: OBSTETRICS & GYNECOLOGY

## 2020-05-26 PROCEDURE — 99999 PR PBB SHADOW E&M-EST. PATIENT-LVL III: ICD-10-PCS | Mod: PBBFAC,,, | Performed by: OBSTETRICS & GYNECOLOGY

## 2020-05-26 RX ORDER — LEVONORGESTREL AND ETHINYL ESTRADIOL 0.15-0.03
1 KIT ORAL DAILY
Qty: 28 TABLET | Refills: 2 | Status: ON HOLD | OUTPATIENT
Start: 2020-05-26 | End: 2020-07-10 | Stop reason: HOSPADM

## 2020-05-26 RX ORDER — NORGESTIMATE AND ETHINYL ESTRADIOL 0.25-0.035
KIT ORAL
COMMUNITY
Start: 2020-05-13 | End: 2020-05-26

## 2020-05-26 NOTE — PROGRESS NOTES
"       GYNECOLOGY OFFICE NOTE    Reason for visit: undesired fertility    HPI: Pt is a 34 y.o.  female  who presents for undesired fertility. Currently has nexplanon. Declines any other form of contraception, including vasectomy. Medicaid consents today. States nexplanon causes too much breakthrough bleeding but declines removal. Would like to take ocp with nexplanon to control breakthrough bleeding as previously discussed    Past Medical History:   Diagnosis Date    Cerebral palsy     Gait abnormality     Low blood pressure        Past Surgical History:   Procedure Laterality Date     SECTION         History reviewed. No pertinent family history.    Social History     Tobacco Use    Smoking status: Never Smoker    Smokeless tobacco: Never Used   Substance Use Topics    Alcohol use: No    Drug use: No       OB History    Para Term  AB Living   1 1       1   SAB TAB Ectopic Multiple Live Births           1      # Outcome Date GA Lbr Jay/2nd Weight Sex Delivery Anes PTL Lv   1 Para 12    M [id number]-[id number]  N MAYLIN       Current Outpatient Medications   Medication Sig    baclofen (LIORESAL) 10 MG tablet TAKE 1/2 TO 1 TABLET BY MOUTH THREE TIMES DAILY AS NEEDED FOR MUSCLE SPASMS    calcium carbonate (CALTRATE 600 ORAL) Take by mouth once daily.    gabapentin (NEURONTIN) 600 MG tablet TAKE 1 TABLET(600 MG) BY MOUTH THREE TIMES DAILY    traMADol (ULTRAM) 50 mg tablet TAKE 1 TO 2 TABLETS(50  MG) BY MOUTH THREE TIMES DAILY AS NEEDED FOR PAIN    traMADoL (ULTRAM) 50 mg tablet Take 1 tablet (50 mg total) by mouth 3 (three) times daily as needed for Pain.    levonorgestrel-ethinyl estradiol (NORDETTE) 0.15-0.03 mg per tablet Take 1 tablet by mouth once daily.     No current facility-administered medications for this visit.        Allergies: Patient has no known allergies.     /60   Ht 5' 3" (1.6 m)   Wt 50.9 kg (112 lb 3.4 oz)   LMP 2020   BMI 19.88 kg/m² "     ROS:  GENERAL: Denies fever or chills.   SKIN: Denies rash or lesions.   HEAD: Denies head injury or headache.   CHEST: Denies chest pain or shortness of breath.   CARDIOVASCULAR: Denies palpitations or chest pain.   ABDOMEN: No constipation, diarrhea, nausea, vomiting or rectal bleeding.   URINARY: No dysuria, hematuria, or burning on urination.  REPRODUCTIVE: See HPI.   BREASTS: see HPI  NEUROLOGIC: Denies syncope or weakness.     Physical Exam:  GENERAL: alert, appears stated age and cooperative  NEUROLOGIC: orientated to person, place and time, normal mood and affect   CHEST: Normal respiratory effort  NECK: normal appearance  SKIN: no acne, hirsutism  Talk only    Diagnosis:  1. Sterilization consult    2. Breakthrough bleeding with nexplanon    Plan:   1. Medicaid tubal salpingectomy consents signed. Rx sent to pharmacy and message sent to surgery scheduler  2. Rx sent    Orders Placed This Encounter    levonorgestrel-ethinyl estradiol (NORDETTE) 0.15-0.03 mg per tablet       Patient was counseled today on the new ACS guidelines for cervical cytology screening as well as the current recommendations for breast cancer screening. She was counseled to follow up with her PCP for other routine health maintenance.     Follow up for preop.      Rosa Mcclendon MD  OB/GYN

## 2020-06-01 ENCOUNTER — OFFICE VISIT (OUTPATIENT)
Dept: PHYSICAL MEDICINE AND REHAB | Facility: CLINIC | Age: 35
End: 2020-06-01
Payer: MEDICAID

## 2020-06-01 VITALS
BODY MASS INDEX: 19.49 KG/M2 | DIASTOLIC BLOOD PRESSURE: 55 MMHG | SYSTOLIC BLOOD PRESSURE: 88 MMHG | HEIGHT: 63 IN | HEART RATE: 82 BPM | WEIGHT: 110 LBS

## 2020-06-01 DIAGNOSIS — R26.9 GAIT DISORDER: Primary | ICD-10-CM

## 2020-06-01 DIAGNOSIS — M62.838 MUSCLE SPASM: ICD-10-CM

## 2020-06-01 DIAGNOSIS — G11.9 CEREBELLAR ATAXIA: ICD-10-CM

## 2020-06-01 DIAGNOSIS — R29.6 FREQUENT FALLS: ICD-10-CM

## 2020-06-01 DIAGNOSIS — G89.29 CHRONIC MIDLINE LOW BACK PAIN WITH BILATERAL SCIATICA: ICD-10-CM

## 2020-06-01 DIAGNOSIS — M54.42 CHRONIC MIDLINE LOW BACK PAIN WITH BILATERAL SCIATICA: ICD-10-CM

## 2020-06-01 DIAGNOSIS — M54.41 CHRONIC MIDLINE LOW BACK PAIN WITH BILATERAL SCIATICA: ICD-10-CM

## 2020-06-01 PROCEDURE — 99999 PR PBB SHADOW E&M-EST. PATIENT-LVL III: ICD-10-PCS | Mod: PBBFAC,,, | Performed by: PHYSICAL MEDICINE & REHABILITATION

## 2020-06-01 PROCEDURE — 99215 OFFICE O/P EST HI 40 MIN: CPT | Mod: S$PBB,,, | Performed by: PHYSICAL MEDICINE & REHABILITATION

## 2020-06-01 PROCEDURE — 99213 OFFICE O/P EST LOW 20 MIN: CPT | Mod: PBBFAC | Performed by: PHYSICAL MEDICINE & REHABILITATION

## 2020-06-01 PROCEDURE — 99215 PR OFFICE/OUTPT VISIT, EST, LEVL V, 40-54 MIN: ICD-10-PCS | Mod: S$PBB,,, | Performed by: PHYSICAL MEDICINE & REHABILITATION

## 2020-06-01 PROCEDURE — 99999 PR PBB SHADOW E&M-EST. PATIENT-LVL III: CPT | Mod: PBBFAC,,, | Performed by: PHYSICAL MEDICINE & REHABILITATION

## 2020-06-01 NOTE — PROGRESS NOTES
Subjective:       Patient ID: Medina Hernandez is a 34 y.o. female.    Chief Complaint: No chief complaint on file.    HPI     HISTORY OF PRESENT ILLNESS:  Ms. Hernandez is a 34-year-old female with past medical history of cerebellar ataxia.  She is followed up in the [facility] for chronic low back pain with bilateral lower extremity radiculopathy.  Her last visit to the clinic was on 11/15/2018.  She was maintained on gabapentin, p.r.n. baclofen and p.r.n. tramadol.    The patient is coming to the clinic for followup follow her pain and for a mobility evaluation for a power mobility device..  Her back pain has been under good control.  It is an intermittent tightness in the whole lumbar spine and paravertebral area.  She has occasional shooting pain to both feet with tingling.  The pain is aggravated by activity and walking, although it can be sporadic and may happen at rest.  Her maximum pain is 7-8/10 and minimum 1-2/10.  Today, it is 1-2/10.  The patient complains of bilateral lower extremity weakness that has been generally syable.  She denies any bowel or bladder incontinence. She continues to complains of spasms and a sense of 'vibration' in her legs.  She is to be followed up by Neurology (Dr. Camargo) but has not been seen since 2014.  She is interested in re-establishing care.    She is currently taking gabapentin 600 mg p.o. 3 times per day.  She takes baclofen 10 mg tablet, half a tablet to 1 tab 3 times per day as needed for muscle spasms.  She takes tramadol 50 mg p.r.n. for pain, usually 1 tablet at bedtime.    For her mobility, the patient lives with her  in a single-story home with no steps to enter.  She requires mild to moderate assistance for feeding, dressing, toileting and bathing.  She can ambulate with rolling walker about 20 ft.  She is restricted by her low back pain and ataxia.  She reports falling frequently, sometimes 2-3 times per day, and so far without any serious  injuries.  She cannot propel a manual wheelchair due to hand fatigue and impaired coordination.          Past Medical History:   Diagnosis Date    Cerebral palsy     Gait abnormality     Low blood pressure              Review of Systems   Constitutional: Positive for fatigue. Negative for chills and fever.   Eyes: Negative for visual disturbance.   Respiratory: Negative for shortness of breath.    Cardiovascular: Negative for chest pain.   Gastrointestinal: Positive for constipation. Negative for blood in stool, nausea and vomiting.   Genitourinary: Negative for difficulty urinating.   Musculoskeletal: Positive for back pain and gait problem. Negative for arthralgias and neck pain.   Neurological: Positive for dizziness, tremors and weakness. Negative for headaches.   Psychiatric/Behavioral: Negative for behavioral problems and sleep disturbance.       Objective:      Physical Exam   Constitutional: She is oriented to person, place, and time. She appears well-developed and well-nourished.   HENT:   Head: Normocephalic and atraumatic.   Neck: Normal range of motion.   Cardiovascular: Normal rate and regular rhythm.   Pulmonary/Chest: Effort normal.   Abdominal: Soft.   Musculoskeletal:   BUE:  ROM:full.  Strength:    RUE: 5-/5 at shoulder abduction, 5 elbow flexion, 5 elbow extension, 5 hand .   LUE: 5-/5 at shoulder abduction, 5 elbow flexion, 5 elbow extension, 5 hand .  Sensation to pinprick:   RUE: intact.   LUE: intact.  DTR:    RUE: +2 biceps, +2 triceps.   LUE:  +2 biceps, +2 triceps.  Disla:   RUE: -ve.   LUE: -ve.    BLE:  ROM:full.  -ve bilateral knee crepitus.   Strength:    RLE: 3/5 at hip flexion, 4 knee extension, 4+ ankle DF, 4+ PF.   LLE: 3/5 at hip flexion, 4 knee extension, 4+ ankle DF, 4+ PF.  Sensation to pinprick:     RLE: intact.      LLE: intact.   DTR:     RLE: +3 knee, +2 ankle.    LLE: +3 knee, +2 ankle.  Clonus:    Rt ankle: -ve.    Lt ankle: -ve.  SLR (sitting):      RLE: +ve.       LLE: +ve.  -ve tenderness lumbar spine.     Neurological: She is alert and oriented to person, place, and time.   Skin: Skin is warm.   Psychiatric: She has a normal mood and affect. Her behavior is normal.   Vitals reviewed.          Assessment:       1. Chronic midline low back pain with bilateral sciatica    2. Cerebellar ataxia    3. Muscle spasm    4. Gait disorder        Plan:           For her pain management:  - Continue gabapentin (NEURONTIN) 600 MG tablet; Take 1 tablet (600 mg total) by mouth 3 times daily.  - Coninue baclofen (LIORESAL) 10 MG tablet; Take 0.5-1 tablets (5-10 mg total) by mouth 3 (three) times daily as needed (muscle spasms).   - Continue traMADol (ULTRAM) 50 mg tablet; Take 1-2 tablets ( mg total) by mouth 3 (three) times daily as needed for Pain.  - Ambulatory consult to Neurology (Dr. Camargo, last saw her in 2014 and she was lost to follow up).  - Follow up in about 4 months (around 10/1/2020).     For her mobility:  - The patient was seen today for mobility evaluation for a power mobility device due to significant impairment at home.  - The patient has multifactorial gait impairment.  - The patient is not able to ambulate safely at home.  - The patient is unable to use a walker functional distances due to cerebellar ataxia and chronic low back pain.  - The patient is unable to use an optimally-configured manual wheelchair at home due to fatigue and impaired upper extremity coordination.  - The patient has intact cognition and should be able to use a power mobility device well at home.  - A prescription for an electric scooter was generated.  - The patient has enough upper & lower extremity strength to be able to transfer to and from the power mobility device.  - The patient has enough range of motion & strength in BUE to allow functional operation of the tiller.  - The patient has good trunk balance and should be able to maintain a safe posture while operating a power  mobility device.  - This will allow the patient to go safely to the kitchen, dining room or living room for feeding & socialization.       Addendum to mobility evaluation (06/17/2020):  An electric scooter was already prescribed.  However, upon further evaluation, the electric scooter will not be very maneuverable and may not accommodate any progression of neurological symptoms.  A power wheelchair prescription will be generated.      This note was partly generated with [employer] voice recognition software. I apologize for any possible typographical errors.

## 2020-06-03 ENCOUNTER — TELEPHONE (OUTPATIENT)
Dept: NEUROLOGY | Facility: CLINIC | Age: 35
End: 2020-06-03

## 2020-06-03 DIAGNOSIS — Z00.00 ROUTINE GENERAL MEDICAL EXAMINATION AT A HEALTH CARE FACILITY: Primary | ICD-10-CM

## 2020-06-05 ENCOUNTER — TELEPHONE (OUTPATIENT)
Dept: PHYSICAL MEDICINE AND REHAB | Facility: CLINIC | Age: 35
End: 2020-06-05

## 2020-06-05 NOTE — TELEPHONE ENCOUNTER
----- Message from Pranay Silvestre sent at 6/5/2020 11:24 AM CDT -----  Contact: Dane Veras  Pt insurance doesn't cover scooters it has to be a power wheel chair      Contact   Ext [phone]

## 2020-06-08 ENCOUNTER — TELEPHONE (OUTPATIENT)
Dept: OBSTETRICS AND GYNECOLOGY | Facility: CLINIC | Age: 35
End: 2020-06-08

## 2020-06-08 ENCOUNTER — TELEPHONE (OUTPATIENT)
Dept: NEUROLOGY | Facility: CLINIC | Age: 35
End: 2020-06-08

## 2020-06-08 NOTE — TELEPHONE ENCOUNTER
----- Message from Rosemarie Jurado sent at 6/8/2020 12:34 PM CDT -----  Contact: Pt  Pt called to speak to the nurse in regards to her procedure on 7/10/2020, she has some questions about it.    Pt can be reached at [phone].    Thank you.

## 2020-06-08 NOTE — TELEPHONE ENCOUNTER
----- Message from Wilmer Najera sent at 6/8/2020 11:20 AM CDT -----  Contact: [phone] / self   Patient would like a call back from your office regarding her upcoming procedure. Please Advise.

## 2020-06-08 NOTE — TELEPHONE ENCOUNTER
Returned call, pt states she was calling regarding her procedure on 7/10. Pt states she would like to know where the cuts would be made, informed pt Dr. Mcclendon will go over everything at her pre op appt. Patient verbalized understanding.

## 2020-06-17 DIAGNOSIS — G11.9 CEREBELLAR ATAXIA: ICD-10-CM

## 2020-06-17 DIAGNOSIS — R29.6 FREQUENT FALLS: ICD-10-CM

## 2020-06-17 DIAGNOSIS — M54.42 CHRONIC MIDLINE LOW BACK PAIN WITH BILATERAL SCIATICA: ICD-10-CM

## 2020-06-17 DIAGNOSIS — R26.9 GAIT DISORDER: Primary | ICD-10-CM

## 2020-06-17 DIAGNOSIS — G89.29 CHRONIC MIDLINE LOW BACK PAIN WITH BILATERAL SCIATICA: ICD-10-CM

## 2020-06-17 DIAGNOSIS — M62.838 MUSCLE SPASM: ICD-10-CM

## 2020-06-17 DIAGNOSIS — M54.41 CHRONIC MIDLINE LOW BACK PAIN WITH BILATERAL SCIATICA: ICD-10-CM

## 2020-06-24 ENCOUNTER — TELEPHONE (OUTPATIENT)
Dept: PHYSICAL MEDICINE AND REHAB | Facility: CLINIC | Age: 35
End: 2020-06-24

## 2020-06-24 DIAGNOSIS — R26.9 GAIT DISORDER: Primary | ICD-10-CM

## 2020-06-24 DIAGNOSIS — G89.29 CHRONIC MIDLINE LOW BACK PAIN WITH BILATERAL SCIATICA: ICD-10-CM

## 2020-06-24 DIAGNOSIS — R29.6 FREQUENT FALLS: ICD-10-CM

## 2020-06-24 DIAGNOSIS — M54.42 CHRONIC MIDLINE LOW BACK PAIN WITH BILATERAL SCIATICA: ICD-10-CM

## 2020-06-24 DIAGNOSIS — M54.41 CHRONIC MIDLINE LOW BACK PAIN WITH BILATERAL SCIATICA: ICD-10-CM

## 2020-06-24 DIAGNOSIS — M62.838 MUSCLE SPASM: ICD-10-CM

## 2020-06-24 DIAGNOSIS — G11.9 CEREBELLAR ATAXIA: ICD-10-CM

## 2020-06-24 NOTE — TELEPHONE ENCOUNTER
----- Message from Seun Thomson sent at 6/24/2020  1:55 PM CDT -----  Contact: Bea gilmore/ Dane @ [phone], ext [phone]  Bea states she received standard written order, but Bea will also need script for PT/OT. Pls fax to .

## 2020-07-08 ENCOUNTER — LAB VISIT (OUTPATIENT)
Dept: LAB | Facility: HOSPITAL | Age: 35
End: 2020-07-08
Attending: OBSTETRICS & GYNECOLOGY
Payer: MEDICAID

## 2020-07-08 ENCOUNTER — OFFICE VISIT (OUTPATIENT)
Dept: OBSTETRICS AND GYNECOLOGY | Facility: CLINIC | Age: 35
End: 2020-07-08
Payer: MEDICAID

## 2020-07-08 VITALS
DIASTOLIC BLOOD PRESSURE: 70 MMHG | SYSTOLIC BLOOD PRESSURE: 110 MMHG | WEIGHT: 105.81 LBS | BODY MASS INDEX: 18.75 KG/M2 | HEIGHT: 63 IN

## 2020-07-08 DIAGNOSIS — Z30.2 STERILIZATION: ICD-10-CM

## 2020-07-08 DIAGNOSIS — Z30.09 STERILIZATION CONSULT: ICD-10-CM

## 2020-07-08 DIAGNOSIS — Z30.2 ENCOUNTER FOR STERILIZATION: ICD-10-CM

## 2020-07-08 LAB
ABO GROUP BLD: NORMAL
ANION GAP SERPL CALC-SCNC: 9 MMOL/L (ref 8–16)
BASOPHILS # BLD AUTO: 0.03 K/UL (ref 0–0.2)
BASOPHILS NFR BLD: 0.8 % (ref 0–1.9)
BLD GP AB SCN CELLS X3 SERPL QL: NORMAL
BUN SERPL-MCNC: 8 MG/DL (ref 6–20)
CALCIUM SERPL-MCNC: 9.2 MG/DL (ref 8.7–10.5)
CHLORIDE SERPL-SCNC: 106 MMOL/L (ref 95–110)
CO2 SERPL-SCNC: 25 MMOL/L (ref 23–29)
CREAT SERPL-MCNC: 0.7 MG/DL (ref 0.5–1.4)
DIFFERENTIAL METHOD: ABNORMAL
EOSINOPHIL # BLD AUTO: 0 K/UL (ref 0–0.5)
EOSINOPHIL NFR BLD: 1.1 % (ref 0–8)
ERYTHROCYTE [DISTWIDTH] IN BLOOD BY AUTOMATED COUNT: 13 % (ref 11.5–14.5)
EST. GFR  (AFRICAN AMERICAN): >60 ML/MIN/1.73 M^2
EST. GFR  (NON AFRICAN AMERICAN): >60 ML/MIN/1.73 M^2
GLUCOSE SERPL-MCNC: 83 MG/DL (ref 70–110)
HCT VFR BLD AUTO: 38.8 % (ref 37–48.5)
HGB BLD-MCNC: 12.6 G/DL (ref 12–16)
IMM GRANULOCYTES # BLD AUTO: 0.01 K/UL (ref 0–0.04)
IMM GRANULOCYTES NFR BLD AUTO: 0.3 % (ref 0–0.5)
LYMPHOCYTES # BLD AUTO: 1 K/UL (ref 1–4.8)
LYMPHOCYTES NFR BLD: 25.4 % (ref 18–48)
MCH RBC QN AUTO: 29.9 PG (ref 27–31)
MCHC RBC AUTO-ENTMCNC: 32.5 G/DL (ref 32–36)
MCV RBC AUTO: 92 FL (ref 82–98)
MONOCYTES # BLD AUTO: 0.3 K/UL (ref 0.3–1)
MONOCYTES NFR BLD: 8.6 % (ref 4–15)
NEUTROPHILS # BLD AUTO: 2.4 K/UL (ref 1.8–7.7)
NEUTROPHILS NFR BLD: 63.8 % (ref 38–73)
NRBC BLD-RTO: 0 /100 WBC
PLATELET # BLD AUTO: 284 K/UL (ref 150–350)
PMV BLD AUTO: 9.9 FL (ref 9.2–12.9)
POTASSIUM SERPL-SCNC: 4.1 MMOL/L (ref 3.5–5.1)
RBC # BLD AUTO: 4.22 M/UL (ref 4–5.4)
RH BLD: NORMAL
SODIUM SERPL-SCNC: 140 MMOL/L (ref 136–145)
WBC # BLD AUTO: 3.74 K/UL (ref 3.9–12.7)

## 2020-07-08 PROCEDURE — 99213 OFFICE O/P EST LOW 20 MIN: CPT | Mod: S$PBB,,, | Performed by: OBSTETRICS & GYNECOLOGY

## 2020-07-08 PROCEDURE — 86850 RBC ANTIBODY SCREEN: CPT

## 2020-07-08 PROCEDURE — 99999 PR PBB SHADOW E&M-EST. PATIENT-LVL III: ICD-10-PCS | Mod: PBBFAC,,, | Performed by: OBSTETRICS & GYNECOLOGY

## 2020-07-08 PROCEDURE — 36415 COLL VENOUS BLD VENIPUNCTURE: CPT

## 2020-07-08 PROCEDURE — 99213 PR OFFICE/OUTPT VISIT, EST, LEVL III, 20-29 MIN: ICD-10-PCS | Mod: S$PBB,,, | Performed by: OBSTETRICS & GYNECOLOGY

## 2020-07-08 PROCEDURE — 86901 BLOOD TYPING SEROLOGIC RH(D): CPT

## 2020-07-08 PROCEDURE — 80048 BASIC METABOLIC PNL TOTAL CA: CPT

## 2020-07-08 PROCEDURE — 85025 COMPLETE CBC W/AUTO DIFF WBC: CPT

## 2020-07-08 PROCEDURE — 99213 OFFICE O/P EST LOW 20 MIN: CPT | Mod: PBBFAC,PO,25 | Performed by: OBSTETRICS & GYNECOLOGY

## 2020-07-08 PROCEDURE — 99999 PR PBB SHADOW E&M-EST. PATIENT-LVL III: CPT | Mod: PBBFAC,,, | Performed by: OBSTETRICS & GYNECOLOGY

## 2020-07-08 RX ORDER — NORGESTIMATE AND ETHINYL ESTRADIOL 0.25-0.035
KIT ORAL
Status: ON HOLD | COMMUNITY
Start: 2020-06-02 | End: 2020-07-10 | Stop reason: HOSPADM

## 2020-07-08 RX ORDER — SODIUM CHLORIDE 9 MG/ML
INJECTION, SOLUTION INTRAVENOUS CONTINUOUS
Status: CANCELLED | OUTPATIENT
Start: 2020-07-08

## 2020-07-08 RX ORDER — MUPIROCIN 20 MG/G
OINTMENT TOPICAL
Status: CANCELLED | OUTPATIENT
Start: 2020-07-08

## 2020-07-08 NOTE — PROGRESS NOTES
"C.C Pre-op Exam      HPI : Medina Hernandez is a 34 y.o. female  for preop appointment for bilateral salpingectomy secondary to undesired fertility and nexplanon removal. Surgery scheduled for 7/10/20.  The pros, cons, risks, benefits and alternatives all laparoscopic surgery were discussed.  The potential for injury to bowel, bladder, blood vessel and ureter was discussed.  The possible need for a blood transfusion discussed. The potential need for more surgery was discussed.  The possible need to do a repair of the ureter and/or bowel and the need to open the abdomen was discussed.  The patient was informed that if the abdomen needed to be opened, that the incision might be a midline not a Pfannenstiel incision. The patients's potential pathology was discussed and her theraputic options reviewed.  The patient has opted to proceed with bilateral salpingectomy. She is aware of all other forms of contraception including vasectomy.       Past Medical History:   Diagnosis Date    Cerebral palsy     Gait abnormality     Low blood pressure      Past Surgical History:   Procedure Laterality Date     SECTION       History reviewed. No pertinent family history.  Social History     Tobacco Use    Smoking status: Never Smoker    Smokeless tobacco: Never Used   Substance Use Topics    Alcohol use: No    Drug use: No     OB History    Para Term  AB Living   1 1       1   SAB TAB Ectopic Multiple Live Births           1      # Outcome Date GA Lbr Jay/2nd Weight Sex Delivery Anes PTL Lv   1 Para 12    M CS-LTranv  N MAYLIN       /70   Ht 5' 3" (1.6 m)   Wt 48 kg (105 lb 13.1 oz)   LMP 2020   BMI 18.75 kg/m²     ROS:  GENERAL: Feeling well overall.   SKIN: Denies rash or lesions.   HEAD: Denies head injury or headache.   NODES: Denies enlarged lymph nodes.   CHEST: Denies chest pain or shortness of breath.   CARDIOVASCULAR: Denies palpitation  ABDOMEN: No abdominal pain, " constipation, diarrhea, nausea, vomiting or rectal bleeding.   URINARY: No frequency, dysuria, hematuria, or burning on urination.  REPRODUCTIVE: See HPI.   BREASTS: Denies pain, lumps, or nipple discharge.   HEMATOLOGIC: No easy bruisability.  MUSCULOSKELETAL: Denies joint pain or swelling.   NEUROLOGIC: Denies syncope or weakness.   PSYCHIATRIC: Denies depression, anxiety or mood swings.      Physical Exam:  GENERAL: alert, appears stated age and cooperative  NEUROLOGIC: orientated to person, place and time, normal mood and affect   CHEST: Normal respiratory effort  NECK: normal appearance  SKIN: no acne, striae, hirsutism  PELVIC: deferred      ASSESSMENT & PLAN:  1. Sterilization   - Full code; Standing  - Vital signs; Standing  - Insert peripheral IV; Standing  - Chlorhexidine (CHG) 2% Wipes; Standing  - Notify Physician/Vital Signs Parameters Urine output less than 0.5mL/kg/hr (with indwelling catheter) or 30 mL/hr (without indwelling catheter) or blood glucose greater than 200 mg/dL; Standing  - Notify physician; Standing  - Notify Physician - Potential Need of Opioid Reversal; Standing  - Diet NPO; Standing  - Chlorohexidine Gluconate Bath; Standing  - Place in Outpatient; Standing  - Place sequential compression device; Standing  - Pregnancy, urine rapid; Standing        I have discussed the risks, benefits, indications, and alternatives of the procedure in detail.  The patient verbalizes her understanding.  All questions answered.  Consents signed.  The patient agrees to proceed to proceed as planned: bilateral salpingectomy and nexplanon removal. Desires open procedure if necessary. CBC/type and screen/covid today. desires Ibuprofen for post op pain only. rtc for post op in 6 weeks      Rosa Mcclendon MD  OB/GYN

## 2020-07-09 ENCOUNTER — ANESTHESIA EVENT (OUTPATIENT)
Dept: SURGERY | Facility: HOSPITAL | Age: 35
End: 2020-07-09
Payer: MEDICAID

## 2020-07-10 ENCOUNTER — ANESTHESIA (OUTPATIENT)
Dept: SURGERY | Facility: HOSPITAL | Age: 35
End: 2020-07-10
Payer: MEDICAID

## 2020-07-10 ENCOUNTER — HOSPITAL ENCOUNTER (OUTPATIENT)
Facility: HOSPITAL | Age: 35
Discharge: HOME | End: 2020-07-10
Attending: OBSTETRICS & GYNECOLOGY | Admitting: OBSTETRICS & GYNECOLOGY
Payer: MEDICAID

## 2020-07-10 ENCOUNTER — TELEPHONE (OUTPATIENT)
Dept: OBSTETRICS AND GYNECOLOGY | Facility: CLINIC | Age: 35
End: 2020-07-10

## 2020-07-10 VITALS
HEIGHT: 63 IN | TEMPERATURE: 98 F | SYSTOLIC BLOOD PRESSURE: 114 MMHG | RESPIRATION RATE: 16 BRPM | OXYGEN SATURATION: 100 % | HEART RATE: 63 BPM | DIASTOLIC BLOOD PRESSURE: 72 MMHG | BODY MASS INDEX: 18.61 KG/M2 | WEIGHT: 105 LBS

## 2020-07-10 DIAGNOSIS — Z30.2 STERILIZATION: ICD-10-CM

## 2020-07-10 DIAGNOSIS — Z30.2 ENCOUNTER FOR STERILIZATION: ICD-10-CM

## 2020-07-10 LAB
B-HCG UR QL: NEGATIVE
CTP QC/QA: YES
SARS-COV-2 RDRP RESP QL NAA+PROBE: NEGATIVE

## 2020-07-10 PROCEDURE — 88302 TISSUE EXAM BY PATHOLOGIST: CPT | Performed by: PATHOLOGY

## 2020-07-10 PROCEDURE — 71000033 HC RECOVERY, INTIAL HOUR: Performed by: OBSTETRICS & GYNECOLOGY

## 2020-07-10 PROCEDURE — 81025 URINE PREGNANCY TEST: CPT | Performed by: OBSTETRICS & GYNECOLOGY

## 2020-07-10 PROCEDURE — 58661 LAPAROSCOPY REMOVE ADNEXA: CPT | Mod: 50,,, | Performed by: OBSTETRICS & GYNECOLOGY

## 2020-07-10 PROCEDURE — 25000003 PHARM REV CODE 250: Performed by: OBSTETRICS & GYNECOLOGY

## 2020-07-10 PROCEDURE — 36000708 HC OR TIME LEV III 1ST 15 MIN: Performed by: OBSTETRICS & GYNECOLOGY

## 2020-07-10 PROCEDURE — 27201423 OPTIME MED/SURG SUP & DEVICES STERILE SUPPLY: Performed by: OBSTETRICS & GYNECOLOGY

## 2020-07-10 PROCEDURE — 88302 PR  SURG PATH,LEVEL II: ICD-10-PCS | Mod: 26,,, | Performed by: PATHOLOGY

## 2020-07-10 PROCEDURE — 58661 PR LAP,RMV  ADNEXAL STRUCTURE: ICD-10-PCS | Mod: 50,,, | Performed by: OBSTETRICS & GYNECOLOGY

## 2020-07-10 PROCEDURE — 00840 ANES IPER PX LOWER ABD NOS: CPT | Performed by: OBSTETRICS & GYNECOLOGY

## 2020-07-10 PROCEDURE — 71000039 HC RECOVERY, EACH ADD'L HOUR: Performed by: OBSTETRICS & GYNECOLOGY

## 2020-07-10 PROCEDURE — 88302 TISSUE EXAM BY PATHOLOGIST: CPT | Mod: 26,,, | Performed by: PATHOLOGY

## 2020-07-10 PROCEDURE — 36000709 HC OR TIME LEV III EA ADD 15 MIN: Performed by: OBSTETRICS & GYNECOLOGY

## 2020-07-10 PROCEDURE — 71000016 HC POSTOP RECOV ADDL HR: Performed by: OBSTETRICS & GYNECOLOGY

## 2020-07-10 PROCEDURE — 63600175 PHARM REV CODE 636 W HCPCS: Performed by: STUDENT IN AN ORGANIZED HEALTH CARE EDUCATION/TRAINING PROGRAM

## 2020-07-10 PROCEDURE — 71000015 HC POSTOP RECOV 1ST HR: Performed by: OBSTETRICS & GYNECOLOGY

## 2020-07-10 PROCEDURE — 63600175 PHARM REV CODE 636 W HCPCS: Performed by: ORTHOPAEDIC SURGERY

## 2020-07-10 PROCEDURE — 37000009 HC ANESTHESIA EA ADD 15 MINS: Performed by: OBSTETRICS & GYNECOLOGY

## 2020-07-10 PROCEDURE — U0002 COVID-19 LAB TEST NON-CDC: HCPCS

## 2020-07-10 PROCEDURE — 25000003 PHARM REV CODE 250: Performed by: STUDENT IN AN ORGANIZED HEALTH CARE EDUCATION/TRAINING PROGRAM

## 2020-07-10 PROCEDURE — 37000008 HC ANESTHESIA 1ST 15 MINUTES: Performed by: OBSTETRICS & GYNECOLOGY

## 2020-07-10 PROCEDURE — 25000003 PHARM REV CODE 250: Performed by: ORTHOPAEDIC SURGERY

## 2020-07-10 RX ORDER — MUPIROCIN 20 MG/G
OINTMENT TOPICAL
Status: DISCONTINUED | OUTPATIENT
Start: 2020-07-10 | End: 2020-07-10 | Stop reason: HOSPADM

## 2020-07-10 RX ORDER — ONDANSETRON 8 MG/1
8 TABLET, ORALLY DISINTEGRATING ORAL EVERY 8 HOURS PRN
Status: CANCELLED | OUTPATIENT
Start: 2020-07-10

## 2020-07-10 RX ORDER — HYDROCODONE BITARTRATE AND ACETAMINOPHEN 10; 325 MG/1; MG/1
1 TABLET ORAL EVERY 4 HOURS PRN
Status: CANCELLED | OUTPATIENT
Start: 2020-07-10

## 2020-07-10 RX ORDER — HYDROCODONE BITARTRATE AND ACETAMINOPHEN 5; 325 MG/1; MG/1
1 TABLET ORAL EVERY 4 HOURS PRN
Status: CANCELLED | OUTPATIENT
Start: 2020-07-10

## 2020-07-10 RX ORDER — ONDANSETRON 2 MG/ML
4 INJECTION INTRAMUSCULAR; INTRAVENOUS DAILY PRN
Status: DISCONTINUED | OUTPATIENT
Start: 2020-07-10 | End: 2020-07-10 | Stop reason: HOSPADM

## 2020-07-10 RX ORDER — DIPHENHYDRAMINE HYDROCHLORIDE 50 MG/ML
25 INJECTION INTRAMUSCULAR; INTRAVENOUS EVERY 4 HOURS PRN
Status: CANCELLED | OUTPATIENT
Start: 2020-07-10

## 2020-07-10 RX ORDER — IBUPROFEN 600 MG/1
600 TABLET ORAL EVERY 6 HOURS PRN
Status: CANCELLED | OUTPATIENT
Start: 2020-07-10

## 2020-07-10 RX ORDER — LIDOCAINE HYDROCHLORIDE 10 MG/ML
INJECTION, SOLUTION EPIDURAL; INFILTRATION; INTRACAUDAL; PERINEURAL
Status: DISCONTINUED | OUTPATIENT
Start: 2020-07-10 | End: 2020-07-10 | Stop reason: HOSPADM

## 2020-07-10 RX ORDER — ROCURONIUM BROMIDE 10 MG/ML
INJECTION, SOLUTION INTRAVENOUS
Status: DISCONTINUED | OUTPATIENT
Start: 2020-07-10 | End: 2020-07-10

## 2020-07-10 RX ORDER — OXYCODONE HYDROCHLORIDE 5 MG/1
5 TABLET ORAL
Status: DISCONTINUED | OUTPATIENT
Start: 2020-07-10 | End: 2020-07-10 | Stop reason: HOSPADM

## 2020-07-10 RX ORDER — LIDOCAINE HYDROCHLORIDE 20 MG/ML
INJECTION INTRAVENOUS
Status: DISCONTINUED | OUTPATIENT
Start: 2020-07-10 | End: 2020-07-10

## 2020-07-10 RX ORDER — FENTANYL CITRATE 50 UG/ML
INJECTION, SOLUTION INTRAMUSCULAR; INTRAVENOUS
Status: DISCONTINUED | OUTPATIENT
Start: 2020-07-10 | End: 2020-07-10

## 2020-07-10 RX ORDER — DIPHENHYDRAMINE HCL 25 MG
25 CAPSULE ORAL EVERY 4 HOURS PRN
Status: CANCELLED | OUTPATIENT
Start: 2020-07-10

## 2020-07-10 RX ORDER — SODIUM CHLORIDE, SODIUM LACTATE, POTASSIUM CHLORIDE, CALCIUM CHLORIDE 600; 310; 30; 20 MG/100ML; MG/100ML; MG/100ML; MG/100ML
INJECTION, SOLUTION INTRAVENOUS CONTINUOUS PRN
Status: DISCONTINUED | OUTPATIENT
Start: 2020-07-10 | End: 2020-07-10

## 2020-07-10 RX ORDER — SODIUM CHLORIDE 9 MG/ML
INJECTION, SOLUTION INTRAVENOUS CONTINUOUS
Status: DISCONTINUED | OUTPATIENT
Start: 2020-07-10 | End: 2020-07-10 | Stop reason: HOSPADM

## 2020-07-10 RX ORDER — ONDANSETRON 2 MG/ML
INJECTION INTRAMUSCULAR; INTRAVENOUS
Status: DISCONTINUED | OUTPATIENT
Start: 2020-07-10 | End: 2020-07-10

## 2020-07-10 RX ORDER — ACETAMINOPHEN 10 MG/ML
INJECTION, SOLUTION INTRAVENOUS
Status: DISCONTINUED | OUTPATIENT
Start: 2020-07-10 | End: 2020-07-10

## 2020-07-10 RX ORDER — PROPOFOL 10 MG/ML
VIAL (ML) INTRAVENOUS
Status: DISCONTINUED | OUTPATIENT
Start: 2020-07-10 | End: 2020-07-10

## 2020-07-10 RX ORDER — IBUPROFEN 600 MG/1
600 TABLET ORAL EVERY 6 HOURS PRN
Qty: 20 TABLET | Refills: 0 | Status: SHIPPED | OUTPATIENT
Start: 2020-07-10 | End: 2020-07-13 | Stop reason: SDUPTHER

## 2020-07-10 RX ORDER — HYDROMORPHONE HYDROCHLORIDE 2 MG/ML
0.2 INJECTION, SOLUTION INTRAMUSCULAR; INTRAVENOUS; SUBCUTANEOUS EVERY 5 MIN PRN
Status: DISPENSED | OUTPATIENT
Start: 2020-07-10 | End: 2020-07-10

## 2020-07-10 RX ORDER — MIDAZOLAM HYDROCHLORIDE 1 MG/ML
INJECTION, SOLUTION INTRAMUSCULAR; INTRAVENOUS
Status: DISCONTINUED | OUTPATIENT
Start: 2020-07-10 | End: 2020-07-10

## 2020-07-10 RX ORDER — PHENYLEPHRINE HYDROCHLORIDE 10 MG/ML
INJECTION INTRAVENOUS
Status: DISCONTINUED | OUTPATIENT
Start: 2020-07-10 | End: 2020-07-10

## 2020-07-10 RX ORDER — HYDROMORPHONE HYDROCHLORIDE 2 MG/ML
0.5 INJECTION, SOLUTION INTRAMUSCULAR; INTRAVENOUS; SUBCUTANEOUS EVERY 30 MIN PRN
Status: DISCONTINUED | OUTPATIENT
Start: 2020-07-10 | End: 2020-07-10 | Stop reason: HOSPADM

## 2020-07-10 RX ORDER — GLYCOPYRROLATE 0.2 MG/ML
INJECTION INTRAMUSCULAR; INTRAVENOUS
Status: DISCONTINUED | OUTPATIENT
Start: 2020-07-10 | End: 2020-07-10

## 2020-07-10 RX ORDER — NEOSTIGMINE METHYLSULFATE 1 MG/ML
INJECTION, SOLUTION INTRAVENOUS
Status: DISCONTINUED | OUTPATIENT
Start: 2020-07-10 | End: 2020-07-10

## 2020-07-10 RX ADMIN — HYDROMORPHONE HYDROCHLORIDE 0.2 MG: 2 INJECTION, SOLUTION INTRAMUSCULAR; INTRAVENOUS; SUBCUTANEOUS at 09:07

## 2020-07-10 RX ADMIN — ACETAMINOPHEN 1000 MG: 10 INJECTION, SOLUTION INTRAVENOUS at 07:07

## 2020-07-10 RX ADMIN — FENTANYL CITRATE 100 MCG: 50 INJECTION, SOLUTION INTRAMUSCULAR; INTRAVENOUS at 07:07

## 2020-07-10 RX ADMIN — PHENYLEPHRINE HYDROCHLORIDE 100 MCG: 10 INJECTION INTRAVENOUS at 07:07

## 2020-07-10 RX ADMIN — SUGAMMADEX 200 MG: 100 INJECTION, SOLUTION INTRAVENOUS at 08:07

## 2020-07-10 RX ADMIN — OXYCODONE HYDROCHLORIDE 5 MG: 5 TABLET ORAL at 10:07

## 2020-07-10 RX ADMIN — MIDAZOLAM 2 MG: 1 INJECTION INTRAMUSCULAR; INTRAVENOUS at 07:07

## 2020-07-10 RX ADMIN — LIDOCAINE HYDROCHLORIDE 100 MG: 20 INJECTION, SOLUTION INTRAVENOUS at 07:07

## 2020-07-10 RX ADMIN — ROCURONIUM BROMIDE 30 MG: 10 INJECTION, SOLUTION INTRAVENOUS at 07:07

## 2020-07-10 RX ADMIN — ONDANSETRON 4 MG: 2 INJECTION, SOLUTION INTRAMUSCULAR; INTRAVENOUS at 08:07

## 2020-07-10 RX ADMIN — NEOSTIGMINE METHYLSULFATE 4 MG: 1 INJECTION INTRAVENOUS at 08:07

## 2020-07-10 RX ADMIN — GLYCOPYRROLATE 0.8 MG: 0.2 INJECTION, SOLUTION INTRAMUSCULAR; INTRAVENOUS at 08:07

## 2020-07-10 RX ADMIN — PROPOFOL 150 MG: 10 INJECTION, EMULSION INTRAVENOUS at 07:07

## 2020-07-10 RX ADMIN — ONDANSETRON 4 MG: 2 INJECTION INTRAMUSCULAR; INTRAVENOUS at 08:07

## 2020-07-10 RX ADMIN — SODIUM CHLORIDE, SODIUM LACTATE, POTASSIUM CHLORIDE, AND CALCIUM CHLORIDE: .6; .31; .03; .02 INJECTION, SOLUTION INTRAVENOUS at 07:07

## 2020-07-10 NOTE — TELEPHONE ENCOUNTER
----- Message from Annemarie Weinberg sent at 7/10/2020  4:48 AM CDT -----  Regarding: LAB  Good Morning,         My name is Claudine Warner I work in the Lab Client Services. We had a problem with some lab work on this patient. If someone from your office could call us at [phone] or ext. [phone] that would be great. Anyone in my department can help.                                                                  Thank you

## 2020-07-10 NOTE — OP NOTE
OPERATIVE REPORT    DATE OF PROCEDURE: 07/10/2020    PREOPERATIVE DIAGNOSIS:   1. Undesired fertility    POSTOPERATIVE DIAGNOSIS:   1. Undesired fertility    PROCEDURE:   1. Laparoscopic Bilateral salpingectomy  2. Nexplanon removal    SURGEON: MILTON Mcclendon MD    ASSISTANT: Evelyn Hall LPN    ESTIMATED BLOOD LOSS: <5 mL     INTRAVENOUS FLUIDS: 800 mL     URINE OUTPUT: 30 mL    SPECIMEN: bilateral fallopian tubes    OPERATIVE FINDINGS: On laparoscopy, normal anteverted uterus, normal tubes and ovaries bilaterally. Nexplanon palpable in left arm.    PROCEDURE IN DETAIL:   The patient was taken to the Operating Room where general anesthesia was achieved. She was prepped and draped in normal sterile fashion and placed in Yell\[facility]\""n stirrups. A sponge stick was placed in the vagina and joseph catheter in the vagina.     Attention was turned to the left arm. The nexplanon was identified and the patient was placed in same position as for insertion. The area was prepped with betadine. 2 cc of 1% lidocaine was injected just underneath end of implant closest to the elbow. Downward pressure was placed on the end of the implant closest to the axilla. Using sterile technique, a 2-3 mm incision was made in longitudinal direction of arm at tip of the implant closest to the elbow. The entire 4 cm implant was removed and discarded. The incision site was closed with steri strips and a small adhesive bandage and then pressure bandage was placed over insertion site.     Next, attention was then turned to the abdomen where the veress needle was introduced into the abdomen through the umbilicus. Confirmation into the intraabdominal cavity was confirmed with a water drop test. The abdomen was then insufflated to 15 mmHg with CO2 gas. A small 5 mm infraumbilical skin incision was made with the scalpel. A 5 mm port was then placed at the umbilical site under direct visualization. The camera was used to inspect the abdomen. The patient was  placed in trendelenburg. Two 5 mm  lateral skin incisions were made with a scalpel and 5 mm trocars was advanced through theses incisions under visualization of the camera.     The left fallopian tube was then grasped, cauterized, and transected from its mesosalpinx using the ligasure. It was removed throught the 5mm trocar. Hemostasis noted. In a similar fashion the right fallopian tube was grasped, cauterized, and transected from its mesosalpinx using the ligasure. It was removed throught the 5mm trocar. Hemostasis was noted. The procedure was then complete. The abdomen was desufflated. All port sites were removed and 5mm port sites were closed dermabond. The sponge stick and joseph were then removed. The patient tolerated the procedure well. Sponge, lap and needle counts were correct. She was taken to Recovery Room in stable condition.      Rosa Mcclendon MD, FACOG  OB/GYN

## 2020-07-10 NOTE — INTERVAL H&P NOTE
The patient has been seen and the H&P has been reviewed: No interval changes since last clinic visit.  Proceed to OR for scheduled procedure. Bilateral salpingectomy and nexplanon removal    Rosa Mcclendon MD  OB/GYN                Active Hospital Problems    Diagnosis  POA    *Encounter for sterilization [Z30.2]  Not Applicable      Resolved Hospital Problems   No resolved problems to display.

## 2020-07-10 NOTE — PLAN OF CARE
Urinated once without difficulty.  Discharge teaching done with patient and spouse.  To car via w/c.  Driven home by

## 2020-07-10 NOTE — PLAN OF CARE
Dr. Victoria updated on pt status, notified of borderline low BP but pt AAOx2 and without complaints.  No new orders received.  ok'd to release pt from pacu

## 2020-07-10 NOTE — TELEPHONE ENCOUNTER
Returned lab call, states they were calling regarding pt's covid testing yesterday, the order was cancelled due to there was no liquid in the container so the test could not be run. Informed them we are aware, pt is doing rapid testing this morning.

## 2020-07-10 NOTE — ANESTHESIA PREPROCEDURE EVALUATION
07/10/2020  Medina Hernandez is a 34 y.o., female hx cerebral palsy schedule for B salpingectomy    Anesthesia Evaluation     I have reviewed the Nursing Notes.    I have reviewed the Medications.     Review of Systems  Anesthesia Hx:  No problems with previous Anesthesia Denies Hx of Anesthetic complications Denies Family Hx of Anesthesia complications.    Social:  Non-Smoker, No Alcohol Use    Hematology/Oncology:  Hematology Normal   Oncology Normal     EENT/Dental:EENT/Dental Normal   Cardiovascular:  Cardiovascular Normal Exercise tolerance: good     Pulmonary:  Pulmonary Normal    Renal/:  Renal/ Normal     Hepatic/GI:  Hepatic/GI Normal    Musculoskeletal:  Musculoskeletal Normal    Neurological:  Neurology Normal    Endocrine:  Endocrine Normal        Physical Exam  General:  Well nourished    Airway/Jaw/Neck:  Airway Findings: Mouth Opening: Normal Tongue: Normal  General Airway Assessment: Adult  Mallampati: I  TM Distance: Normal, at least 6 cm      Dental:  Dental Findings: In tact    Chest/Lungs:  Chest/Lungs Findings: Clear to auscultation, Normal Respiratory Rate     Heart/Vascular:  Heart Findings: Rate: Normal  Rhythm: Regular Rhythm     Abdomen:  Abdomen Findings:  Normal     Musculoskeletal:  Musculoskeletal Findings:     Mental Status:  Mental Status Findings:  Cooperative, Alert and Oriented         Anesthesia Plan  Type of Anesthesia, risks & benefits discussed:  Anesthesia Type:  general  Patient's Preference: general  Intra-op Monitoring Plan: standard ASA monitors  Intra-op Monitoring Plan Comments:   Post Op Pain Control Plan: multimodal analgesia, IV/PO Opioids PRN and per primary service following discharge from PACU  Post Op Pain Control Plan Comments:   Induction:   IV  Beta Blocker:         Informed Consent: Patient understands risks and agrees with Anesthesia plan.  Questions  answered. Anesthesia consent signed with patient.  ASA Score: 2     Day of Surgery Review of History & Physical:            Ready For Surgery From Anesthesia Perspective.

## 2020-07-10 NOTE — ANESTHESIA POSTPROCEDURE EVALUATION
Anesthesia Post Evaluation    Patient: Medina Hernandez    Procedure(s) Performed: Procedure(s) (LRB):  SALPINGECTOMY, LAPAROSCOPIC (Bilateral)  EXPLANT (Left)    Final Anesthesia Type: general    Patient participation: Yes- Able to Participate  Level of consciousness: awake and alert  Post-procedure vital signs: reviewed and stable  Pain management: adequate  Airway patency: patent    PONV status at discharge: No PONV  Anesthetic complications: no      Cardiovascular status: blood pressure returned to baseline and hemodynamically stable  Respiratory status: unassisted  Hydration status: euvolemic  Follow-up not needed.          Vitals Value Taken Time   BP 92/51 07/10/20 0945   Temp 36.6 °C (97.8 °F) 07/10/20 0940   Pulse 60 07/10/20 0947   Resp 17 07/10/20 1007   SpO2 100 % 07/10/20 0947   Vitals shown include unvalidated device data.      Event Time   Out of Recovery 09:44:35         Pain/Kevin Score: Pain Rating Prior to Med Admin: 4 (7/10/2020 10:07 AM)  Kevin Score: 10 (7/10/2020  9:50 AM)

## 2020-07-10 NOTE — DISCHARGE SUMMARY
[facility]  Obstetrics & Gynecology  Discharge Summary    Patient Name: Medina Hernandez  MRN: [id number]  Admission Date: 7/10/2020  Hospital Length of Stay: 0 days  Discharge Date  07/10/2020  Attending Physician: Rosa Mcclendon MD   Discharging Provider: Rosa Mcclendon MD  Primary Care Provider: Ivett Cardoso MD    Hospital Course: Patient was admitted for an outpatient procedure (bilateral salpingectomy/nexplanon removal secondary to undesired fertility) and tolerated the procedure well with no complications. Please see operative report for further details. Following the procedure the patient was awakened from anesthesia and transferred to the recovery area in stable condition. Patient was discharged to home once ambulating, voiding, tolerating clear liquids and pain well controlled. Patient given routine post-op instructions and prescriptions for pain medication to take as needed. Patient instructed to follow up with me in 4 weeks for postoperative appointment.       Procedure(s) (LRB):  SALPINGECTOMY, LAPAROSCOPIC (Bilateral)  EXPLANT (Left)       Pending Diagnostic Studies:     None        Final Active Diagnoses:    Diagnosis Date Noted POA    PRINCIPAL PROBLEM:  Encounter for sterilization [Z30.2] 07/10/2020 Not Applicable      Problems Resolved During this Admission:        Discharged Condition: good    Disposition: Home or Self Care    Follow Up:  Follow-up Information     Rosa Mcclendon MD In 4 weeks.    Specialties: Obstetrics, Obstetrics and Gynecology  Why: Postoperative visit  Contact information:  [address]  [address] [place] [address] [id number] [phone]                 Patient Instructions:      Diet Adult Regular     Pelvic Rest     Notify your health care provider if you experience any of the following:  temperature >100.4     Notify your health care provider if you experience any of the following:  persistent nausea and vomiting or diarrhea     Notify your health care  provider if you experience any of the following:  severe uncontrolled pain     Notify your health care provider if you experience any of the following:  redness, tenderness, or signs of infection (pain, swelling, redness, odor or green/yellow discharge around incision site)     Notify your health care provider if you experience any of the following:  difficulty breathing or increased cough     Notify your health care provider if you experience any of the following:  severe persistent headache     Notify your health care provider if you experience any of the following:  worsening rash     Notify your health care provider if you experience any of the following:  persistent dizziness, light-headedness, or visual disturbances     Notify your health care provider if you experience any of the following:  increased confusion or weakness     No dressing needed     Activity as tolerated     Medications:  Reconciled Home Medications:      Medication List      START taking these medications    ibuprofen 600 MG tablet  Commonly known as: ADVIL,MOTRIN  Take 1 tablet (600 mg total) by mouth every 6 (six) hours as needed.        CONTINUE taking these medications    baclofen 10 MG tablet  Commonly known as: LIORESAL  TAKE 1/2 TO 1 TABLET BY MOUTH THREE TIMES DAILY AS NEEDED FOR MUSCLE SPASMS     CALTRATE 600 ORAL  Take by mouth once daily.     gabapentin 600 MG tablet  Commonly known as: NEURONTIN  TAKE 1 TABLET(600 MG) BY MOUTH THREE TIMES DAILY     traMADoL 50 mg tablet  Commonly known as: ULTRAM  Take 1 tablet (50 mg total) by mouth 3 (three) times daily as needed for Pain.        STOP taking these medications    ESTARYLLA 0.25-35 mg-mcg per tablet  Generic drug: norgestimate-ethinyl estradioL     levonorgestrel-ethinyl estradiol 0.15-0.03 mg per tablet  Commonly known as: CURTIS Mcclendon MD  Obstetrics & Gynecology  [facility]

## 2020-07-10 NOTE — DISCHARGE INSTRUCTIONS
Discharge Instruction for Laparoscopic Fallopian Tube Ligation  Your doctor did a sterilization procedure to prevent any future pregnancies. This is a permanent form of birth control. Several different methods of surgical sterilization can be used to block the fallopian tubes. They all stop the egg from entering the womb (uterus) and sperm from traveling up to fertilize the egg. You had a laparoscopic procedure. The incisions on your abdomen may be tender or sore. You may also have pain in your upper back or shoulders. This is from the gas used to enlarge the abdomen to allow your doctor to see inside your pelvis and do the procedure. This pain usually goes away in a day or two.  Here's what you can do to speed your recovery after surgery.   Home care  · Take it easy. Stay quiet and rest for 2 days.  · Return to your normal activities after 48 hours. You may also return to work at that time.  · Eat a normal diet.  · If needed, take an over-the-counter pain reliever for pain.  · Don't lift anything heavier than 10 pounds for 1 week after the procedure.  · Don't drive for at least 24 hours after the procedure and until pain is minimal without taking opioids if prescribed  · Don't have sex for 2 weeks after surgery.  Follow-up care  Make a follow-up appointment as directed by our staff.     When to call your healthcare provider  Call your healthcare provider right away if you have any of the following:  · Fever above 100.4°F (38°C) or higher, or as directed by your healthcare provider  · Chills  · Dizziness or fainting  · Abdominal pain and swelling that get worse  · Nausea and vomiting  · Signs of infection. These include drainage, pus, warmth, or redness at your incision site.  · Sudden chest pain or shortness of breath   Date Last Reviewed: 5/19/2015  © 9085-9227 [employer]. [address] [place] [address], [place] [address]. All rights reserved. This information is not intended as a substitute for  professional medical care. Always follow your healthcare professional's instructions.            ANESTHESIA  -For the first 24 hours after surgery:  Do not drive, use heavy equipment, make important decisions, or drink alcohol  -It is normal to feel sleepy for several hours.  Rest until you are more awake.  -Have someone stay with you, if needed.  They can watch for problems and help keep you safe.  -Some possible post anesthesia side effects include: nausea and vomiting, sore throat and hoarseness, sleepiness, and dizziness.    PAIN  -If you have pain after surgery, pain medicine will help you feel better.  Take it as directed, before pain becomes severe.  Most pain relievers taken by mouth need at least 20-30 minutes to start working.  -Do not drive or drink alcohol while taking pain medicine.  -Pain medication can upset your stomach.  Taking them with a little food may help.  -Other ways to help control pain: elevation, ice, and relaxation  -Call your surgeon if still having unmanageable pain an hour after taking pain medicine.  -Pain medicine can cause constipation.  Taking an over-the counter stool softener while on prescription pain medicine and drinking plenty of fluids can prevent this side effect.  -Call your surgeon if you have severe side effects like: breathing problems, trouble waking up, dizziness, confusion, or severe constipation.    NAUSEA  -Some people have nausea after surgery.  This is often because of anesthesia, pain, pain medicine, or the stress of surgery.  -Do not push yourself to eat.  Start off with clear liquids and soup.  Slowly move to solid foods.  Don't eat fatty, rich, spicy foods at first.  Eat smaller amounts.  -If you develop persistent nausea and vomiting please notify your surgeon immediately.    BLEEDING  -Different types of surgery require different types of care and dressing changes.  It is important to follow all instructions and advice from your surgeon.  Change dressing as  directed.  Call your surgeon for any concerns regarding postop bleeding.    SIGNS OF INFECTION  -Signs of infection include: fever, swelling, drainage, and redness  -Notify your surgeon if you have a fever of 100.4 F (38.0 C) or higher.  -Notify your surgeon if you notice redness, swelling, increased pain, pus, or a foul smell at the incision site.

## 2020-07-10 NOTE — TRANSFER OF CARE
"Anesthesia Transfer of Care Note    Patient: Medina Hernandez    Procedure(s) Performed: Procedure(s) (LRB):  SALPINGECTOMY, LAPAROSCOPIC (Bilateral)  EXPLANT (Left)    Patient location: PACU    Anesthesia Type: general    Transport from OR: Transported from OR on 6-10 L/min O2 by face mask with adequate spontaneous ventilation    Post pain: adequate analgesia    Post assessment: no apparent anesthetic complications    Post vital signs: stable    Level of consciousness: awake and alert    Nausea/Vomiting: no nausea/vomiting    Complications: none    Transfer of care protocol was followed      Last vitals:   Visit Vitals  /50   Pulse 60   Temp 36.8 °C (98.2 °F) (Skin)   Resp 14   Ht 5' 3" (1.6 m)   Wt 47.6 kg (105 lb)   LMP 06/30/2020   SpO2 100%   Breastfeeding No   BMI 18.60 kg/m²     "

## 2020-07-10 NOTE — PLAN OF CARE
Tolerating po fluids well.  Postop prescription filled and delivered to patient by pharmacy.  No distress.

## 2020-07-13 ENCOUNTER — TELEPHONE (OUTPATIENT)
Dept: OBSTETRICS AND GYNECOLOGY | Facility: CLINIC | Age: 35
End: 2020-07-13

## 2020-07-13 RX ORDER — IBUPROFEN 600 MG/1
600 TABLET ORAL EVERY 6 HOURS PRN
Qty: 30 TABLET | Refills: 0 | Status: SHIPPED | OUTPATIENT
Start: 2020-07-13 | End: 2020-08-17 | Stop reason: SDUPTHER

## 2020-07-13 NOTE — TELEPHONE ENCOUNTER
Returned call, pt states she was calling to see if she could remove the bandaids and take a shower. Informed pt she can take a shower and take them off.

## 2020-07-13 NOTE — TELEPHONE ENCOUNTER
----- Message from Selene Howell sent at 7/13/2020 10:51 AM CDT -----  Contact: [phone]/patient  Patient calling to speak with you concerning when she can remove her bandage and when she can shower.   Please call back to assist at [phone]

## 2020-07-17 LAB
FINAL PATHOLOGIC DIAGNOSIS: NORMAL
GROSS: NORMAL

## 2020-07-21 ENCOUNTER — TELEPHONE (OUTPATIENT)
Dept: OBSTETRICS AND GYNECOLOGY | Facility: CLINIC | Age: 35
End: 2020-07-21

## 2020-07-21 NOTE — TELEPHONE ENCOUNTER
----- Message from Veronica Marcano sent at 7/21/2020  1:47 PM CDT -----  Contact: [phone]  Pt is calling to inform the Office the pt is having internal pain from previous Procedure and is requesting a callback.    Please call

## 2020-07-22 ENCOUNTER — TELEPHONE (OUTPATIENT)
Dept: OBSTETRICS AND GYNECOLOGY | Facility: CLINIC | Age: 35
End: 2020-07-22

## 2020-07-22 NOTE — TELEPHONE ENCOUNTER
----- Message from Chas Velázquez sent at 7/22/2020 11:39 AM CDT -----  Type:  Needs Medical Advice    Who Called: self  Reason:returning call  Would the patient rather a call back or a response via [facility]? callback  Best Call Back Number:  [phone]  Additional Information: none

## 2020-07-22 NOTE — TELEPHONE ENCOUNTER
Tried calling pt back, recorder came on and said, can not take calls at this time, call back later.

## 2020-07-22 NOTE — TELEPHONE ENCOUNTER
----- Message from Chas Velázquez sent at 7/22/2020 10:46 AM CDT -----  Type:  Needs Medical Advice    Who Called: self  Reason:returning call  Would the patient rather a call back or a response via [facility]? callback  Best Call Back Number: [phone]  Additional Information: none

## 2020-07-22 NOTE — TELEPHONE ENCOUNTER
----- Message from Jenni Espino sent at 7/21/2020  4:41 PM CDT -----  Contact: [phone]/Self  Type:  Needs Medical Advice    Who Called: Pt   Symptoms (please be specific): Pain on vaginal area   How long has patient had these symptoms:  Few days   Pharmacy name and phone #:  -  Would the patient rather a call back or a response via [facility]? Call back   Best Call Back Number: [phone]  Additional Information:

## 2020-07-22 NOTE — TELEPHONE ENCOUNTER
Returned call, pt states she is experiencing pain that comes in the vaginal/abdominal area. Pt states it is tolerable and just wanted to know if it was normal. Pt states she is out of town and won't be back until after the 12th, pt scheduled for post op visit 8/14 at 2:45pm but will come in at 1pm.

## 2020-07-29 ENCOUNTER — TELEPHONE (OUTPATIENT)
Dept: OBSTETRICS AND GYNECOLOGY | Facility: CLINIC | Age: 35
End: 2020-07-29

## 2020-07-29 NOTE — TELEPHONE ENCOUNTER
----- Message from Jenni Espino sent at 7/29/2020  3:30 PM CDT -----  Contact: [phone]/Self  Patient is requesting to speak with nurse regarding her post op appointment on 08/14/2020. Please advise.

## 2020-07-29 NOTE — TELEPHONE ENCOUNTER
Returned call, pt states she has her post op scheduled for 8/14 but not her  has changed so she needs to move it to a Monday. Pt rescheduled for 8/17 at 1:30pm. Patient verbalized understanding.

## 2020-08-03 ENCOUNTER — TELEPHONE (OUTPATIENT)
Dept: PHYSICAL MEDICINE AND REHAB | Facility: CLINIC | Age: 35
End: 2020-08-03

## 2020-08-04 ENCOUNTER — TELEPHONE (OUTPATIENT)
Dept: PHYSICAL MEDICINE AND REHAB | Facility: CLINIC | Age: 35
End: 2020-08-04

## 2020-08-04 NOTE — TELEPHONE ENCOUNTER
Called and spoke with Bea @ [facility].  Bea will contact Mac and call the office back.  Mac has the orders.    Called Mrs Hernandez and left this message on her answer machine along with [employer] number if she has any questions.          ----- Message from Violet Goldberg sent at 8/4/2020 11:41 AM CDT -----  Regarding: request a call back  Contact: Medina @ [phone]  Pt would like a call back concerning her wheelchair referral

## 2020-08-04 NOTE — TELEPHONE ENCOUNTER
----- Message from Seun Thomson sent at 8/4/2020  2:22 PM CDT -----  Contact: Bea gilmore/ Dane @ [phone], ext [phone]  Bea states she's returning Kaylan's call

## 2020-08-11 ENCOUNTER — TELEPHONE (OUTPATIENT)
Dept: PHYSICAL MEDICINE AND REHAB | Facility: CLINIC | Age: 35
End: 2020-08-11

## 2020-08-11 NOTE — TELEPHONE ENCOUNTER
----- Message from Seun Thomson sent at 8/11/2020  1:07 PM CDT -----  Contact: CHAPIN gilmore/ Dane @ [phone]  J asking to speak mando/ Kaylan about getting PT/OT eval completed

## 2020-08-14 ENCOUNTER — TELEPHONE (OUTPATIENT)
Dept: NEUROLOGY | Facility: CLINIC | Age: 35
End: 2020-08-14

## 2020-08-14 NOTE — TELEPHONE ENCOUNTER
Called and inform patient that  is still out of the office and her return is unknown.     Patient will call back and rescheduled

## 2020-08-17 ENCOUNTER — OFFICE VISIT (OUTPATIENT)
Dept: OBSTETRICS AND GYNECOLOGY | Facility: CLINIC | Age: 35
End: 2020-08-17
Payer: MEDICAID

## 2020-08-17 VITALS
DIASTOLIC BLOOD PRESSURE: 72 MMHG | HEIGHT: 63 IN | WEIGHT: 103.38 LBS | SYSTOLIC BLOOD PRESSURE: 110 MMHG | BODY MASS INDEX: 18.32 KG/M2

## 2020-08-17 DIAGNOSIS — Z98.890 POST-OPERATIVE STATE: Primary | ICD-10-CM

## 2020-08-17 PROCEDURE — 99999 PR PBB SHADOW E&M-EST. PATIENT-LVL III: CPT | Mod: PBBFAC,,, | Performed by: OBSTETRICS & GYNECOLOGY

## 2020-08-17 PROCEDURE — 99213 OFFICE O/P EST LOW 20 MIN: CPT | Mod: PBBFAC,PO | Performed by: OBSTETRICS & GYNECOLOGY

## 2020-08-17 PROCEDURE — 99024 PR POST-OP FOLLOW-UP VISIT: ICD-10-PCS | Mod: ,,, | Performed by: OBSTETRICS & GYNECOLOGY

## 2020-08-17 PROCEDURE — 99999 PR PBB SHADOW E&M-EST. PATIENT-LVL III: ICD-10-PCS | Mod: PBBFAC,,, | Performed by: OBSTETRICS & GYNECOLOGY

## 2020-08-17 PROCEDURE — 99024 POSTOP FOLLOW-UP VISIT: CPT | Mod: ,,, | Performed by: OBSTETRICS & GYNECOLOGY

## 2020-08-17 RX ORDER — IBUPROFEN 600 MG/1
600 TABLET ORAL EVERY 12 HOURS PRN
Qty: 30 TABLET | Refills: 0 | OUTPATIENT
Start: 2020-08-17 | End: 2021-08-13

## 2020-08-17 NOTE — PROGRESS NOTES
"CC: Postoperative visit    Medina Hernandez is a 34 y.o. female  presents for a postoperative visit s/p laparoscopic bilateral salpingectomy on 07/10/2020.  Her postoperative course was uncomplicated.  She is doing well postoperatively- reports some pain alleviated with ibuprofen. Last pap 2018, denies history of abnormal     Pathology showed:  Complete cross-sections of unremarkable bilateral fallopian tube and benign   fimbriated end.     /72   Ht 5' 3" (1.6 m)   Wt 46.9 kg (103 lb 6.4 oz)   LMP 2020   BMI 18.32 kg/m²     ROS:  GENERAL: No fever, chills, fatigability or weight loss.  VULVAR: No pain, no lesions and no itching.  VAGINAL: No relaxation, no itching, no discharge, no abnormal bleeding and no lesions.  ABDOMEN: No abdominal pain. Denies nausea. Denies vomiting. No diarrhea. No constipation  BREAST: Denies pain. No lumps. No discharge.  URINARY: No incontinence, no nocturia, no frequency and no dysuria.  CARDIOVASCULAR: No chest pain. No shortness of breath. No leg cramps.  NEUROLOGICAL: No headaches. No vision changes.    Physical Exam:  GENERAL: alert, appears stated age and cooperative  NEUROLOGIC: orientated to person, place and time, normal mood and affect   CHEST: Normal respiratory effort  NECK: normal appearance  SKIN: no acne, striae, hirsutism  ABDOMEN: abdomen is soft without significant tenderness  INCISION: c/d/i, no signs of infection      1. Post-operative state         Return to clinic in 1yr for annual with pap. Refill on ibuprofen sent    Rosa Mcclendon MD, FACOG  OB/GYN    "

## 2020-09-01 ENCOUNTER — CLINICAL SUPPORT (OUTPATIENT)
Dept: REHABILITATION | Facility: HOSPITAL | Age: 35
End: 2020-09-01
Payer: MEDICAID

## 2020-09-01 DIAGNOSIS — Z74.09 IMPAIRED MOBILITY AND ACTIVITIES OF DAILY LIVING: ICD-10-CM

## 2020-09-01 DIAGNOSIS — M25.60 STIFFNESS IN JOINT: ICD-10-CM

## 2020-09-01 DIAGNOSIS — Z78.9 IMPAIRED MOBILITY AND ACTIVITIES OF DAILY LIVING: ICD-10-CM

## 2020-09-01 PROCEDURE — 97166 OT EVAL MOD COMPLEX 45 MIN: CPT | Mod: PN

## 2020-09-01 NOTE — PLAN OF CARE
[facility] Occupational Therapy  Initial Neurological Evaluation     Date: 9/1/2020  Patient: Medina Hernandez  Chart Number: [id number]    Therapy Diagnosis:   Encounter Diagnoses   Name Primary?    Stiffness in joint     Impaired mobility and activities of daily living      Physician: Shiloh Archuleta MD    Physician Orders: Eval for w/c only.   Medical Diagnosis: Gait Disorder, Cerebral Ataxia  Onset Date: Years  Evaluation Date: 9/1/2020  Plan of Care Expiration Period: 10/30/20  Insurance Authorization period Expiration: 12/31/20  Date of Return to MD: ETHAN  Visit # / Visits Authortized: 1 / 1  FOTO: None/None    Time In:10:40 am   Time Out: 11:30 am   Total Billable (one on one) Time: 50 minutes    Precautions: Standard and Fall    Subjective     History of Current Condition: Cerebral ataxia, pt been w/c bound for years via pt report. Increased weakness lately with ambulation and mobility limited. She has had several falls lately. Able to transfer to and from bed to w/c with min/ mod a at this time.     Involved Side: Bilateral leg weakness  Dominant Side: Right  Date of Onset: years via pt report  Surgical Procedure: No major surgeries  Imaging: MRI studies, CT scan films   Previous Therapy: some at home and she would like to continue HH services.     Patient's Goals for Therapy: She would like to get a power chair to help her be more IND at home and be able to do chores and home management tasks. She needs to be able to get her son from school and to and from the grocery store due to her  working.     Pain:  Pain Related Behaviors Observed: no   Functional Pain Scale Rating 0-10:   6/10 on average  3/10 at best  8/10 at worst  Location: All down her legs and spasms due to cerebral ataxia  Description: Aching  Aggravating Factors: Getting out of bed/chair  Easing Factors: rest    Occupation:   and mother for her one son.   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     Functional Limitations/Social History:    Prior Level of Function: mod A  Current Level of Function:max A    Home/Living environment : lives with their family, lives with their spouse and lives with their son 5 people in the home.   Home Access: One story home with two steps to enter that.   DME: wheelchair     Leisure: Gardening, Driving, Needlework/Sewing, Crafts and cooking    Past Medical History/Physical Systems Review:   Medina Hernandez  has a past medical history of Cerebral palsy, Gait abnormality, and Low blood pressure.    Medina Hernandez  has a past surgical history that includes  section and Laparoscopic salpingectomy (Bilateral, 7/10/2020).    Medina has a current medication list which includes the following prescription(s): baclofen, calcium carbonate, gabapentin, ibuprofen, and tramadol.    Review of patient's allergies indicates:  No Known Allergies     Other:     Objective   Cognitive Exam  Oriented: xs 4 person place time situation  Visual/perceptual: No deficits    Physical Exam:  AROM/PROM,Strength and Coordination:    UE WNL with strength limited in BUE 3+/5     LE Limited hip flexion and ext  Tone: Increased tone in the hamstrings   Sensation: Intact with LEs and UEs    Posture:   Trunk:fair core control   Head good control     Skin integrity: Inact.       Pt. Is in wheelchair 8-10 hours a day. Medina Hernandez is able to do pressure reliefs.   Tone:  Modified Theresa Scale:     Balance:   Static Sit: good  Dynamic sit: fair  Static Stand:poor  Dynamic stand: poor -     BRII Marin from [facility]  performed seating measures in my presence.  Functional Status:    Functional Mobility:  Bed mobility: Max A  Roll to left: Max A  Roll to right: Max A  Supine to sit: Min A  Sit to supine: Min A  Transfers to bed: mod A  Transfers to toilet: mod A  Car transfers: max A with  A  Wheelchair mobility: min A but cannot propel more than 150 ft and needs to propel  community distances.     ADL's:  Feeding: IND  Grooming: IND  Hygiene:mod A  UB Dressing: Mod A  LB Dressing: max A  Toileting:  Max A  Bathing: Max A    IADL's:  Homecare: Max A   Cooking: Max A   Laundry: Max A  Yard work: Max A  Use of telephone: Mod I  Money management:mod I  Medication management: min A  LEFS score:                     Functional Status    Comments:      CMS Impairment/Limitation/Restriction for FOTO none Survey    Therapist reviewed FOTO scores for Medina Hernandez on 9/1/2020.   FOTO documents entered into [employer] - see Media section.    Limitation Score: NA%  Category: Self Care    Current : CL = least 60% but < 80% impaired, limited or restricted  Goal: CH = 0 % impaired, limited or restricted           Treatment     Treatment Time In: 0  Treatment Time Out: 0  Total Treatment time separate from Evaluation time:0      Home Exercises and Patient Education Provided    Education provided:   -role of OT, goals for OT, scheduling/cancellations, insurance limitations with patient.  -Additional Education provided: of chair selection and possible future needs along with environmental modifications needed.     Written Home Exercises Provided: None.  Exercises were reviewed and Medina was able to demonstrate them prior to the end of the session.    Medina demonstrated good  understanding of the education provided.     See EMR under None for exercises provided none.    Assessment     Medina Hernandez is a 34 y.o. female referred to outpatient occupational therapy and presents with a medical diagnosis of cerebral ataxia, resulting in pain, decreased flexibility, decreased range of motion, decreased muscle strength, impaired function and decreased work ability and demonstrates limitations as described in the chart below. Following medical record review it is determined that pt will benefit from occupational therapy services in order to maximize pain free and/or functional use of bilateral LEs.    Pt prognosis is  Fair due to  CLOF and prognosis.   Pt will benefit from skilled outpatient Occupational Therapy to address the deficits stated above and in the chart below, provide pt/family education, and to maximize pt's level of independence.     Plan of care discussed with patient: Yes  Pt's spiritual, cultural and educational needs considered and patient is agreeable to the plan of care and goals as stated below:     Anticipated Barriers for therapy: transportation.    Medical Necessity is demonstrated by the following  Profile and History Assessment of Occupational Performance Level of Clinical Decision Making Complexity Score   Occupational Profile:   Medina Hernandez is a 34 y.o. female who lives with their family and is currently unemployed and disability as . Medina Hernandez has difficulty with  feeding, bathing, grooming and dressing  driving/transportation management, shopping, phone/computer use, housework/household chores and medication management  affecting his/her daily functional abilities. His/her main goal for therapy is get a power chair so she can take her son to and from school which is walking distance.     Comorbidities:   excessive commute time/distance, financial considerations, level of undertstanding of current condition, transportation assistance required and young age    Medical and Therapy History Review:   Brief               Performance Deficits    Physical:  Joint Mobility  Muscle Power/Strength  Muscle Endurance  Control of Voluntary Movement   Strength  Pinch Strength  Gross Motor Coordination  Fine Motor Coordination  Muscle Tone  Pain    Cognitive:  No Deficits    Psychosocial:    Social Interaction  Habits  Routines  Rituals  Family Support  Group Participation     Clinical Decision Making:  moderate    Assessment Process:  Detailed Assessments    Modification/Need for Assistance:  Minimal-Moderate Modifications/Assistance    Intervention Selection:  Several Treatment Options        moderate  Based on PMHX, co morbidities , data from assessments and functional level of assistance required with task and clinical presentation directly impacting function.       The following goals were discussed with the patient and patient is in agreement with them as to be addressed in the treatment plan.     Goals:  Short Term Goals:  1) Initiate approval process for power wheelchair.      Plan   Certification Period/Plan of care expiration: 9/1/2020 to 10/30/20.    Outpatient Occupational Therapy not recommended at this time. Pt requesting HH services due to inability to transport to and from OP clinic as well as caregivers work schedule.      Clint York, OT

## 2020-09-30 ENCOUNTER — TELEPHONE (OUTPATIENT)
Dept: INTERNAL MEDICINE | Facility: CLINIC | Age: 35
End: 2020-09-30

## 2020-09-30 NOTE — TELEPHONE ENCOUNTER
----- Message from Izabela Lemon sent at 9/30/2020 10:58 AM CDT -----  Contact: patient [phone]  Patient said that she has been waiting for several months for a call about starting home health. Please call pt today

## 2020-10-06 ENCOUNTER — OFFICE VISIT (OUTPATIENT)
Dept: INTERNAL MEDICINE | Facility: CLINIC | Age: 35
End: 2020-10-06
Payer: MEDICAID

## 2020-10-06 VITALS
HEART RATE: 79 BPM | SYSTOLIC BLOOD PRESSURE: 90 MMHG | WEIGHT: 101 LBS | TEMPERATURE: 98 F | HEIGHT: 63 IN | DIASTOLIC BLOOD PRESSURE: 54 MMHG | BODY MASS INDEX: 17.89 KG/M2 | OXYGEN SATURATION: 99 %

## 2020-10-06 DIAGNOSIS — Z78.9 IMPAIRED MOBILITY AND ACTIVITIES OF DAILY LIVING: ICD-10-CM

## 2020-10-06 DIAGNOSIS — G80.9 CEREBRAL PALSY, UNSPECIFIED TYPE: Primary | ICD-10-CM

## 2020-10-06 DIAGNOSIS — R26.9 GAIT DISORDER: ICD-10-CM

## 2020-10-06 DIAGNOSIS — R29.898 LEG WEAKNESS, BILATERAL: ICD-10-CM

## 2020-10-06 DIAGNOSIS — Z74.09 IMPAIRED MOBILITY AND ACTIVITIES OF DAILY LIVING: ICD-10-CM

## 2020-10-06 PROCEDURE — 99213 PR OFFICE/OUTPT VISIT, EST, LEVL III, 20-29 MIN: ICD-10-PCS | Mod: S$PBB,,, | Performed by: HOSPITALIST

## 2020-10-06 PROCEDURE — 99213 OFFICE O/P EST LOW 20 MIN: CPT | Mod: S$PBB,,, | Performed by: HOSPITALIST

## 2020-10-06 PROCEDURE — 99214 OFFICE O/P EST MOD 30 MIN: CPT | Mod: PBBFAC,PO | Performed by: HOSPITALIST

## 2020-10-06 PROCEDURE — 99999 PR PBB SHADOW E&M-EST. PATIENT-LVL IV: ICD-10-PCS | Mod: PBBFAC,,, | Performed by: HOSPITALIST

## 2020-10-06 PROCEDURE — 99999 PR PBB SHADOW E&M-EST. PATIENT-LVL IV: CPT | Mod: PBBFAC,,, | Performed by: HOSPITALIST

## 2020-10-06 NOTE — PROGRESS NOTES
"Subjective:     @Patient ID: Medina Hernandez is a 34 y.o. female.    Chief Complaint: Follow-up (discuss home health)    HPI  35 yo F presents for f/u. Pt reports worsening leg weakness from cerebral palsy. Reports using wheelchair more than usual. Would like home health pt. Denies recent falls  States not able to do outpatient therapy at this time. Reports currently homebound        Review of Systems   Musculoskeletal: Positive for gait problem.   Neurological: Positive for weakness.     Past medical history, surgical history, and family medical history reviewed and updated as appropriate.    Medications and allergies reviewed.     Objective:     Vitals:    10/06/20 1103   BP: (!) 90/54   BP Location: Right arm   Patient Position: Sitting   BP Method: Medium (Manual)   Pulse: 79   Temp: 98.2 °F (36.8 °C)   TempSrc: Temporal   SpO2: 99%   Weight: 45.8 kg (100 lb 15.5 oz)   Height: 5' 3" (1.6 m)     Body mass index is 17.89 kg/m².  Physical Exam  Vitals signs reviewed.   Constitutional:       General: She is not in acute distress.     Appearance: She is well-developed.      Comments: Sitting in wheelchair    HENT:      Head: Normocephalic and atraumatic.   Eyes:      General:         Right eye: No discharge.         Left eye: No discharge.      Conjunctiva/sclera: Conjunctivae normal.   Neck:      Musculoskeletal: Normal range of motion and neck supple.   Cardiovascular:      Rate and Rhythm: Normal rate and regular rhythm.      Heart sounds: No murmur. No friction rub.   Pulmonary:      Effort: Pulmonary effort is normal.      Breath sounds: Normal breath sounds.   Musculoskeletal:      Right lower leg: No edema.      Left lower leg: No edema.      Comments: 3/5 LLE  4/5 RLE   Skin:     General: Skin is warm and dry.   Neurological:      Mental Status: She is alert and oriented to person, place, and time.   Psychiatric:         Mood and Affect: Mood normal.         Behavior: Behavior normal.         Lab Results "   Component Value Date    WBC 3.74 (L) 07/08/2020    HGB 12.6 07/08/2020    HCT 38.8 07/08/2020     07/08/2020    CHOL 119 (L) 02/11/2020    TRIG 33 02/11/2020    HDL 54 02/11/2020    ALT 11 02/11/2020    AST 17 02/11/2020     07/08/2020    K 4.1 07/08/2020     07/08/2020    CREATININE 0.7 07/08/2020    BUN 8 07/08/2020    CO2 25 07/08/2020    TSH 1.070 02/11/2020       Assessment:     1. Cerebral palsy, unspecified type    2. Leg weakness, bilateral    3. Impaired mobility and activities of daily living    4. Gait disorder      Plan:   Medina was seen today for follow-up.    Diagnoses and all orders for this visit:    Cerebral palsy, unspecified type  -     Ambulatory referral/consult to Home Health; Future    Leg weakness, bilateral  -     Ambulatory referral/consult to Home Health; Future    Impaired mobility and activities of daily living  -     Ambulatory referral/consult to Home Health; Future    Gait disorder          No follow-ups on file.    Ivett Cardoso MD  Internal Medicine    10/6/2020

## 2020-10-12 ENCOUNTER — TELEPHONE (OUTPATIENT)
Dept: OBSTETRICS AND GYNECOLOGY | Facility: CLINIC | Age: 35
End: 2020-10-12

## 2020-10-12 NOTE — TELEPHONE ENCOUNTER
----- Message from Selene Howell sent at 10/12/2020  9:05 AM CDT -----  Contact: PATIENT// [phone]  Type:  Same Day Appointment Request    Caller is requesting a same day appointment.  Caller declined first available appointment listed below.      Name of Caller:PATIENT   When is the first available appointment? 11-  Symptoms:VAGINAL PAIN  Best Call Back Number:  [phone]  Additional Information: NONE

## 2020-10-13 ENCOUNTER — OFFICE VISIT (OUTPATIENT)
Dept: PHYSICAL MEDICINE AND REHAB | Facility: CLINIC | Age: 35
End: 2020-10-13
Payer: MEDICAID

## 2020-10-13 VITALS
DIASTOLIC BLOOD PRESSURE: 55 MMHG | BODY MASS INDEX: 19.14 KG/M2 | HEIGHT: 63 IN | SYSTOLIC BLOOD PRESSURE: 89 MMHG | WEIGHT: 108 LBS | HEART RATE: 101 BPM

## 2020-10-13 DIAGNOSIS — G11.9 CEREBELLAR ATAXIA: ICD-10-CM

## 2020-10-13 DIAGNOSIS — R26.9 GAIT DISORDER: ICD-10-CM

## 2020-10-13 DIAGNOSIS — G89.29 CHRONIC MIDLINE LOW BACK PAIN WITH BILATERAL SCIATICA: Primary | ICD-10-CM

## 2020-10-13 DIAGNOSIS — M54.41 CHRONIC MIDLINE LOW BACK PAIN WITH BILATERAL SCIATICA: Primary | ICD-10-CM

## 2020-10-13 DIAGNOSIS — M62.838 MUSCLE SPASM: ICD-10-CM

## 2020-10-13 DIAGNOSIS — M54.42 CHRONIC MIDLINE LOW BACK PAIN WITH BILATERAL SCIATICA: Primary | ICD-10-CM

## 2020-10-13 PROCEDURE — 99214 PR OFFICE/OUTPT VISIT, EST, LEVL IV, 30-39 MIN: ICD-10-PCS | Mod: S$PBB,,, | Performed by: PHYSICAL MEDICINE & REHABILITATION

## 2020-10-13 PROCEDURE — 99999 PR PBB SHADOW E&M-EST. PATIENT-LVL II: ICD-10-PCS | Mod: PBBFAC,,, | Performed by: PHYSICAL MEDICINE & REHABILITATION

## 2020-10-13 PROCEDURE — 99999 PR PBB SHADOW E&M-EST. PATIENT-LVL II: CPT | Mod: PBBFAC,,, | Performed by: PHYSICAL MEDICINE & REHABILITATION

## 2020-10-13 PROCEDURE — 99212 OFFICE O/P EST SF 10 MIN: CPT | Mod: PBBFAC | Performed by: PHYSICAL MEDICINE & REHABILITATION

## 2020-10-13 PROCEDURE — 99214 OFFICE O/P EST MOD 30 MIN: CPT | Mod: S$PBB,,, | Performed by: PHYSICAL MEDICINE & REHABILITATION

## 2020-10-13 RX ORDER — GABAPENTIN 600 MG/1
600 TABLET ORAL 2 TIMES DAILY
Start: 2020-10-13 | End: 2020-11-16

## 2020-10-13 RX ORDER — ACETAMINOPHEN 500 MG
500-1000 TABLET ORAL 3 TIMES DAILY PRN
Refills: 0 | COMMUNITY
Start: 2020-10-13

## 2020-10-13 NOTE — PROGRESS NOTES
Subjective:       Patient ID: Medina Hernandez is a 34 y.o. female.    Chief Complaint: No chief complaint on file.    HPI     HISTORY OF PRESENT ILLNESS:  Ms. Hernandez is a 34-year-old female with past medical history of cerebellar ataxia.  She is followed up in the [facility] for chronic low back pain with bilateral lower extremity radiculopathy.  Her last visit to the clinic was on 6/1/2020.  She was maintained on gabapentin, p.r.n. baclofen and p.r.n. tramadol.  It mobility evaluation was done and a prescription for a power wheelchair was generated.  She did not receive it yet.    The patient is coming to the clinic for follow up.  Her back pain has been under good control.  It is an intermittent tightness in the whole lumbar spine and paravertebral area.  She has occasional shooting pain to both feet with tingling.  The pain is aggravated by activity and walking, although it can be sporadic and may happen at rest.  Her maximum pain is 6/10 and minimum 1-2/10.  Today, it is 2/10.  The patient complains of bilateral lower extremity weakness that has been generally stable.  She denies any bowel or bladder incontinence. She continues to complains of spasms and a sense of 'vibration' in her legs.  She had an appointment with Dr. Maliha Melton from Neurology but has been postponed due to the doctor being sick..    She is currently taking gabapentin 600 mg p.o. twice per day.  She takes baclofen 10 mg tablet, usually 1 tablet daily as needed for muscle spasms.  She takes tramadol 50 mg p.r.n. but rarely.    For her mobility, the patient continues to live with her  in a single-story home with no steps to enter.  She is independent with  feeding, dressing, toileting and bathing but it takes her a long time.  She can ambulate with rolling walker about 20 ft.  She is restricted by her low back pain and ataxia.  She reports falling frequently, sometimes 2-3 times per day, and so far without any serious  injuries.  Her last fall was yesterday.  She cannot propel a manual wheelchair due to hand fatigue and impaired coordination.      Past Medical History:   Diagnosis Date    Cerebral palsy     Gait abnormality     Low blood pressure              Review of Systems   Constitutional: Positive for fatigue. Negative for chills and fever.   Eyes: Negative for visual disturbance.   Respiratory: Negative for shortness of breath.    Cardiovascular: Negative for chest pain.   Gastrointestinal: Positive for constipation. Negative for blood in stool, nausea and vomiting.   Genitourinary: Negative for difficulty urinating.   Musculoskeletal: Positive for back pain and gait problem. Negative for arthralgias and neck pain.   Neurological: Positive for tremors and weakness. Negative for dizziness and headaches.   Psychiatric/Behavioral: Negative for behavioral problems and sleep disturbance.       Objective:      Physical Exam  Vitals signs reviewed.   Constitutional:       Appearance: She is well-developed.      Comments: Coming to the clinic in a transport wheelchair.     HENT:      Head: Normocephalic and atraumatic.   Neck:      Musculoskeletal: Normal range of motion.   Musculoskeletal:      Comments: BUE:  ROM:full.  Strength:    RUE: 5-/5 at shoulder abduction, 5 elbow flexion, 5 elbow extension, 5 hand .   LUE: 5-/5 at shoulder abduction, 5 elbow flexion, 5 elbow extension, 5 hand .  Sensation to pinprick:   RUE: intact.   LUE: intact.  DTR:    RUE: +2 biceps, +2 triceps.   LUE:  +2 biceps, +2 triceps.  Disla:   RUE: -ve.   LUE: -ve.    BLE:  ROM:full.  -ve bilateral knee crepitus.   Strength:    RLE: 3/5 at hip flexion, 4 knee extension, 4+ ankle DF, 4+ PF.   LLE: 3/5 at hip flexion, 4 knee extension, 4+ ankle DF, 4+ PF.  Sensation to pinprick:     RLE: intact.      LLE: intact.   DTR:     RLE: +2 knee, +2 ankle.    LLE: +2 knee, +2 ankle.  Clonus:    Rt ankle: -ve.    Lt ankle: -ve.  SLR (sitting):      RLE:  -ve.      LLE: -ve.  -ve tenderness lumbar spine.     Skin:     General: Skin is warm.   Neurological:      Mental Status: She is alert and oriented to person, place, and time.   Psychiatric:         Behavior: Behavior normal.             Assessment:       1. Chronic midline low back pain with bilateral sciatica    2. Cerebellar ataxia    3. Muscle spasm    4. Gait disorder        Plan:         - Continue gabapentin (NEURONTIN) 600 MG tablet; Take 1 tablet (600 mg total) by mouth twice daily.  - Coninue baclofen (LIORESAL) 10 MG tablet; Take 0.5-1 tablets (5-10 mg total) by mouth 3 (three) times daily as needed (muscle spasms).   - Start acetaminophen (TYLENOL) 500 MG tablet; Take 1-2 tablets (500-1,000 mg total) by mouth 3 (three) times daily as needed for mild pain.  - Continue traMADol (ULTRAM) 50 mg tablet; Take 1-2 tablets ( mg total) by mouth daily as needed for Pain.  - She was asked to contact Neurology for appointment for follow-up for her ataxia.  - Follow up in about 6 months (around 4/13/2021).     This was a 25 minute visit, more than 50% of which was spent counseling the patient about the diagnosis and the treatment plan.      This note was partly generated with [employer] voice recognition software. I apologize for any possible typographical errors.

## 2020-10-14 ENCOUNTER — TELEPHONE (OUTPATIENT)
Dept: NEUROLOGY | Facility: CLINIC | Age: 35
End: 2020-10-14
Payer: MEDICAID

## 2020-10-14 NOTE — TELEPHONE ENCOUNTER
----- Message from Minoo De La Cruz sent at 10/14/2020  1:09 PM CDT -----  Contact: pt  Please call pt at [phone]    Patient is being referred to Dr Melton only from Dr Archuleta    Thank you

## 2020-10-19 ENCOUNTER — TELEPHONE (OUTPATIENT)
Dept: NEUROLOGY | Facility: CLINIC | Age: 35
End: 2020-10-19

## 2020-10-19 ENCOUNTER — TELEPHONE (OUTPATIENT)
Dept: OBSTETRICS AND GYNECOLOGY | Facility: CLINIC | Age: 35
End: 2020-10-19

## 2020-10-19 NOTE — TELEPHONE ENCOUNTER
----- Message from Paulina Rodríguez MA sent at 10/15/2020  3:37 PM CDT -----  Contact: pt    ----- Message -----  From: Minoo De La Cruz  Sent: 10/15/2020  11:48 AM CDT  To: [facility] Staff    Please call pt at [phone]     Patient is requesting an appt for muscle problems     Referred by Dr Archuleta    2nd call request     Thank you

## 2020-10-20 ENCOUNTER — OFFICE VISIT (OUTPATIENT)
Dept: OBSTETRICS AND GYNECOLOGY | Facility: CLINIC | Age: 35
End: 2020-10-20
Payer: MEDICAID

## 2020-10-20 ENCOUNTER — APPOINTMENT (OUTPATIENT)
Dept: LAB | Facility: HOSPITAL | Age: 35
End: 2020-10-20
Attending: OBSTETRICS & GYNECOLOGY
Payer: MEDICAID

## 2020-10-20 VITALS — BODY MASS INDEX: 17.5 KG/M2 | WEIGHT: 98.75 LBS | SYSTOLIC BLOOD PRESSURE: 110 MMHG | DIASTOLIC BLOOD PRESSURE: 72 MMHG

## 2020-10-20 DIAGNOSIS — N76.0 ACUTE VAGINITIS: Primary | ICD-10-CM

## 2020-10-20 PROCEDURE — 99213 OFFICE O/P EST LOW 20 MIN: CPT | Mod: S$PBB,,, | Performed by: OBSTETRICS & GYNECOLOGY

## 2020-10-20 PROCEDURE — 99213 OFFICE O/P EST LOW 20 MIN: CPT | Mod: PBBFAC,PO | Performed by: OBSTETRICS & GYNECOLOGY

## 2020-10-20 PROCEDURE — 99213 PR OFFICE/OUTPT VISIT, EST, LEVL III, 20-29 MIN: ICD-10-PCS | Mod: S$PBB,,, | Performed by: OBSTETRICS & GYNECOLOGY

## 2020-10-20 PROCEDURE — 87529 HSV DNA AMP PROBE: CPT

## 2020-10-20 PROCEDURE — 99999 PR PBB SHADOW E&M-EST. PATIENT-LVL III: ICD-10-PCS | Mod: PBBFAC,,, | Performed by: OBSTETRICS & GYNECOLOGY

## 2020-10-20 PROCEDURE — 99999 PR PBB SHADOW E&M-EST. PATIENT-LVL III: CPT | Mod: PBBFAC,,, | Performed by: OBSTETRICS & GYNECOLOGY

## 2020-10-20 RX ORDER — INFLUENZA A VIRUS A/VICTORIA/2454/2019 IVR-207 (H1N1) ANTIGEN (PROPIOLACTONE INACTIVATED), INFLUENZA A VIRUS A/HONG KONG/2671/2019 IVR-208 (H3N2) ANTIGEN (PROPIOLACTONE INACTIVATED), INFLUENZA B VIRUS B/VICTORIA/705/2018 BVR-11 ANTIGEN (PROPIOLACTONE INACTIVATED), INFLUENZA B VIRUS B/PHUKET/3073/2013 BVR-1B ANTIGEN (PROPIOLACTONE INACTIVATED) 15; 15; 15; 15 UG/.5ML; UG/.5ML; UG/.5ML; UG/.5ML
INJECTION, SUSPENSION INTRAMUSCULAR
COMMUNITY
Start: 2020-08-17 | End: 2024-03-20

## 2020-10-20 RX ORDER — VALACYCLOVIR HYDROCHLORIDE 500 MG/1
TABLET, FILM COATED ORAL
Qty: 60 TABLET | Refills: 2 | Status: SHIPPED | OUTPATIENT
Start: 2020-10-20 | End: 2022-02-20

## 2020-10-20 RX ORDER — LIDOCAINE HYDROCHLORIDE 20 MG/ML
JELLY TOPICAL 3 TIMES DAILY
Qty: 30 ML | Refills: 1 | Status: SHIPPED | OUTPATIENT
Start: 2020-10-20

## 2020-10-20 NOTE — PROGRESS NOTES
GYNECOLOGY OFFICE NOTE    Reason for visit: vaginal irritation    HPI: Pt is a 34 y.o.  female  who presents for evaluation of vaginal irritation since x 1week. + sores. Nothing otc helps Cycle: menarche- 15.  Interval-  Q month, Duration- 7 days, Flow- normal, denies dysmenorrhea. She is sexually active. She does desire STI screening. She denies vaginal discharge.  Last pap: 2018, denies hx of abnormal. She uses bilateral tubal ligation for contraception.     Past Medical History:   Diagnosis Date    Cerebral palsy     Gait abnormality     Low blood pressure        Past Surgical History:   Procedure Laterality Date     SECTION      LAPAROSCOPIC SALPINGECTOMY Bilateral 7/10/2020    Procedure: SALPINGECTOMY, LAPAROSCOPIC;  Surgeon: Rosa Mcclendon MD;  Location: [facility] OR;  Service: OB/GYN;  Laterality: Bilateral;  video confirmed  CW       History reviewed. No pertinent family history.    Social History     Tobacco Use    Smoking status: Never Smoker    Smokeless tobacco: Never Used   Substance Use Topics    Alcohol use: No    Drug use: No       OB History    Para Term  AB Living   1 1       1   SAB TAB Ectopic Multiple Live Births           1      # Outcome Date GA Lbr Jay/2nd Weight Sex Delivery Anes PTL Lv   1 Para //    M CS-[id number]  N MAYLIN       Current Outpatient Medications   Medication Sig    acetaminophen (TYLENOL) 500 MG tablet Take 1-2 tablets (500-1,000 mg total) by mouth 3 (three) times daily as needed for Pain.    baclofen (LIORESAL) 10 MG tablet TAKE 1/2 TO 1 TABLET BY MOUTH THREE TIMES DAILY AS NEEDED FOR MUSCLE SPASMS    calcium carbonate (CALTRATE 600 ORAL) Take by mouth once daily.    gabapentin (NEURONTIN) 600 MG tablet Take 1 tablet (600 mg total) by mouth 2 (two) times daily.    ibuprofen (ADVIL,MOTRIN) 600 MG tablet Take 1 tablet (600 mg total) by mouth every 12 (twelve) hours as needed.    traMADoL (ULTRAM) 50 mg tablet Take 1 tablet (50 mg  total) by mouth 3 (three) times daily as needed for Pain.    AFLURIA QD 2020-21,3YR UP,,PF, 60 mcg (15 mcg x 4)/0.5 mL Syrg ADM 0.5ML IM UTD    lidocaine HCL 2% (XYLOCAINE) 2 % jelly Apply topically 3 (three) times daily. Apply to affected area three times daily prn    valACYclovir (VALTREX) 500 MG tablet Take two tablets twice a day for 5 days. For recurrent episodes: 1 tablet twice daily for 3 days. For Suppression:take 1 tablet once daily     No current facility-administered medications for this visit.        Allergies: Patient has no known allergies.     /72   Wt 44.8 kg (98 lb 12.3 oz)   LMP 10/09/2020   BMI 17.50 kg/m²     ROS:  GENERAL: Denies fever or chills.   SKIN: Denies rash or lesions.   HEAD: Denies head injury or headache.   CHEST: Denies chest pain or shortness of breath.   CARDIOVASCULAR: Denies palpitations or chest pain.   ABDOMEN: No constipation, diarrhea, nausea, vomiting or rectal bleeding.   URINARY: No dysuria, hematuria, or burning on urination.  REPRODUCTIVE: See HPI.   BREASTS: see HPI  NEUROLOGIC: Denies syncope or weakness.     Physical Exam:  GENERAL: alert, appears stated age and cooperative  NEUROLOGIC: orientated to person, place and time, normal mood and affect   CHEST: Normal respiratory effort  NECK: normal appearance  SKIN: no acne, hirsutism  BREAST EXAM: not examined  ABDOMEN: abdomen is soft without significant tenderness, masses  EXTERNAL GENITALIA:  + HSV ulcerations on labia and perineum  URETHRA: normal urethra, normal urethral meatus      Diagnosis:  1. Acute vaginitis        Plan:   1. HSV and affirm collected. Pt unable to urinate for gc/ct - rx valtrex sent/ topical lidocaine      Orders Placed This Encounter    Vaginosis Screen by DNA Probe    HSV by Rapid PCR, Non-Blood [facility]; Vagina    valACYclovir (VALTREX) 500 MG tablet    lidocaine HCL 2% (XYLOCAINE) 2 % jelly         RTC as needed    Rosa Mcclendon MD  OB/GYN

## 2020-10-22 LAB
CANDIDA RRNA VAG QL PROBE: NEGATIVE
G VAGINALIS RRNA GENITAL QL PROBE: POSITIVE
T VAGINALIS RRNA GENITAL QL PROBE: NEGATIVE

## 2020-10-22 RX ORDER — METRONIDAZOLE 500 MG/1
500 TABLET ORAL EVERY 12 HOURS
Qty: 14 TABLET | Refills: 0 | Status: SHIPPED | OUTPATIENT
Start: 2020-10-22 | End: 2020-10-29

## 2020-10-23 LAB
HSV1 DNA SPEC QL NAA+PROBE: POSITIVE
HSV2 DNA SPEC QL NAA+PROBE: NEGATIVE
SPECIMEN SOURCE: ABNORMAL

## 2020-10-31 PROCEDURE — G0180 PR HOME HEALTH MD CERTIFICATION: ICD-10-PCS | Mod: ,,, | Performed by: HOSPITALIST

## 2020-10-31 PROCEDURE — G0180 MD CERTIFICATION HHA PATIENT: HCPCS | Mod: ,,, | Performed by: HOSPITALIST

## 2020-11-09 ENCOUNTER — DOCUMENT SCAN (OUTPATIENT)
Dept: HOME HEALTH SERVICES | Facility: HOSPITAL | Age: 35
End: 2020-11-09
Payer: MEDICAID

## 2020-11-10 ENCOUNTER — EXTERNAL HOME HEALTH (OUTPATIENT)
Dept: HOME HEALTH SERVICES | Facility: HOSPITAL | Age: 35
End: 2020-11-10
Payer: MEDICAID

## 2020-11-30 ENCOUNTER — DOCUMENT SCAN (OUTPATIENT)
Dept: HOME HEALTH SERVICES | Facility: HOSPITAL | Age: 35
End: 2020-11-30
Payer: MEDICAID

## 2021-01-04 ENCOUNTER — PATIENT MESSAGE (OUTPATIENT)
Dept: ADMINISTRATIVE | Facility: HOSPITAL | Age: 36
End: 2021-01-04

## 2021-04-05 ENCOUNTER — PATIENT MESSAGE (OUTPATIENT)
Dept: ADMINISTRATIVE | Facility: HOSPITAL | Age: 36
End: 2021-04-05

## 2021-04-13 ENCOUNTER — PATIENT MESSAGE (OUTPATIENT)
Dept: RESEARCH | Facility: HOSPITAL | Age: 36
End: 2021-04-13

## 2021-04-26 ENCOUNTER — TELEPHONE (OUTPATIENT)
Dept: PHYSICAL MEDICINE AND REHAB | Facility: CLINIC | Age: 36
End: 2021-04-26

## 2021-05-17 ENCOUNTER — TELEPHONE (OUTPATIENT)
Dept: PHYSICAL MEDICINE AND REHAB | Facility: CLINIC | Age: 36
End: 2021-05-17

## 2021-06-30 ENCOUNTER — TELEPHONE (OUTPATIENT)
Dept: OBSTETRICS AND GYNECOLOGY | Facility: CLINIC | Age: 36
End: 2021-06-30

## 2021-07-06 ENCOUNTER — PATIENT MESSAGE (OUTPATIENT)
Dept: ADMINISTRATIVE | Facility: HOSPITAL | Age: 36
End: 2021-07-06

## 2021-07-14 ENCOUNTER — TELEPHONE (OUTPATIENT)
Dept: OBSTETRICS AND GYNECOLOGY | Facility: CLINIC | Age: 36
End: 2021-07-14

## 2021-07-26 ENCOUNTER — TELEPHONE (OUTPATIENT)
Dept: INTERNAL MEDICINE | Facility: CLINIC | Age: 36
End: 2021-07-26

## 2021-08-06 ENCOUNTER — TELEPHONE (OUTPATIENT)
Dept: INTERNAL MEDICINE | Facility: CLINIC | Age: 36
End: 2021-08-06

## 2021-08-09 ENCOUNTER — HOSPITAL ENCOUNTER (EMERGENCY)
Facility: HOSPITAL | Age: 36
Discharge: HOME | End: 2021-08-10
Attending: EMERGENCY MEDICINE
Payer: MEDICAID

## 2021-08-09 DIAGNOSIS — S01.81XA FACIAL LACERATION, INITIAL ENCOUNTER: ICD-10-CM

## 2021-08-09 DIAGNOSIS — S01.111A LACERATION OF RIGHT EYEBROW, INITIAL ENCOUNTER: Primary | ICD-10-CM

## 2021-08-09 PROCEDURE — 25000003 PHARM REV CODE 250: Performed by: NURSE PRACTITIONER

## 2021-08-09 PROCEDURE — 99283 EMERGENCY DEPT VISIT LOW MDM: CPT | Mod: 25

## 2021-08-09 PROCEDURE — 12013 RPR F/E/E/N/L/M 2.6-5.0 CM: CPT

## 2021-08-09 RX ORDER — LIDOCAINE HYDROCHLORIDE AND EPINEPHRINE 10; 10 MG/ML; UG/ML
10 INJECTION, SOLUTION INFILTRATION; PERINEURAL
Status: COMPLETED | OUTPATIENT
Start: 2021-08-09 | End: 2021-08-10

## 2021-08-09 RX ORDER — LIDOCAINE HYDROCHLORIDE 10 MG/ML
10 INJECTION INFILTRATION; PERINEURAL
Status: DISCONTINUED | OUTPATIENT
Start: 2021-08-09 | End: 2021-08-10

## 2021-08-09 RX ORDER — ACETAMINOPHEN 500 MG
1000 TABLET ORAL
Status: COMPLETED | OUTPATIENT
Start: 2021-08-09 | End: 2021-08-09

## 2021-08-09 RX ADMIN — ACETAMINOPHEN 1000 MG: 500 TABLET ORAL at 08:08

## 2021-08-10 ENCOUNTER — TELEPHONE (OUTPATIENT)
Dept: PHYSICAL MEDICINE AND REHAB | Facility: CLINIC | Age: 36
End: 2021-08-10

## 2021-08-10 VITALS
SYSTOLIC BLOOD PRESSURE: 101 MMHG | BODY MASS INDEX: 19.14 KG/M2 | HEIGHT: 63 IN | DIASTOLIC BLOOD PRESSURE: 63 MMHG | RESPIRATION RATE: 15 BRPM | TEMPERATURE: 99 F | WEIGHT: 108 LBS | HEART RATE: 71 BPM | OXYGEN SATURATION: 99 %

## 2021-08-10 DIAGNOSIS — G11.9 ATAXIA DUE TO CEREBELLAR DEGENERATION: ICD-10-CM

## 2021-08-10 DIAGNOSIS — G89.29 CHRONIC MIDLINE LOW BACK PAIN WITH BILATERAL SCIATICA: ICD-10-CM

## 2021-08-10 DIAGNOSIS — M54.42 CHRONIC MIDLINE LOW BACK PAIN WITH BILATERAL SCIATICA: ICD-10-CM

## 2021-08-10 DIAGNOSIS — M54.41 CHRONIC MIDLINE LOW BACK PAIN WITH BILATERAL SCIATICA: ICD-10-CM

## 2021-08-10 PROCEDURE — 25000003 PHARM REV CODE 250: Performed by: NURSE PRACTITIONER

## 2021-08-10 PROCEDURE — 25000003 PHARM REV CODE 250: Performed by: EMERGENCY MEDICINE

## 2021-08-10 RX ORDER — GABAPENTIN 600 MG/1
600 TABLET ORAL 3 TIMES DAILY
Qty: 90 TABLET | Refills: 2 | Status: SHIPPED | OUTPATIENT
Start: 2021-08-10 | End: 2021-10-11 | Stop reason: DRUGHIGH

## 2021-08-10 RX ORDER — IBUPROFEN 600 MG/1
600 TABLET ORAL
Status: COMPLETED | OUTPATIENT
Start: 2021-08-10 | End: 2021-08-10

## 2021-08-10 RX ORDER — TRAMADOL HYDROCHLORIDE 50 MG/1
50 TABLET ORAL 3 TIMES DAILY PRN
Qty: 90 TABLET | Refills: 0 | Status: SHIPPED | OUTPATIENT
Start: 2021-08-10 | End: 2023-06-05

## 2021-08-10 RX ORDER — BACLOFEN 20 MG/1
20 TABLET ORAL 3 TIMES DAILY
Qty: 90 TABLET | Refills: 2 | Status: SHIPPED | OUTPATIENT
Start: 2021-08-10 | End: 2022-05-12 | Stop reason: SDUPTHER

## 2021-08-10 RX ADMIN — LIDOCAINE HYDROCHLORIDE AND EPINEPHRINE 10 ML: 10; 10 INJECTION, SOLUTION INFILTRATION; PERINEURAL at 01:08

## 2021-08-10 RX ADMIN — IBUPROFEN 600 MG: 600 TABLET ORAL at 01:08

## 2021-08-12 ENCOUNTER — OFFICE VISIT (OUTPATIENT)
Dept: INTERNAL MEDICINE | Facility: CLINIC | Age: 36
End: 2021-08-12
Payer: MEDICAID

## 2021-08-12 VITALS
HEART RATE: 78 BPM | OXYGEN SATURATION: 98 % | SYSTOLIC BLOOD PRESSURE: 120 MMHG | DIASTOLIC BLOOD PRESSURE: 60 MMHG | RESPIRATION RATE: 16 BRPM | WEIGHT: 104.5 LBS | BODY MASS INDEX: 18.52 KG/M2 | TEMPERATURE: 98 F | HEIGHT: 63 IN

## 2021-08-12 DIAGNOSIS — G80.9 CEREBRAL PALSY, UNSPECIFIED TYPE: ICD-10-CM

## 2021-08-12 DIAGNOSIS — Z74.09 IMPAIRED MOBILITY AND ACTIVITIES OF DAILY LIVING: ICD-10-CM

## 2021-08-12 DIAGNOSIS — Z78.9 IMPAIRED MOBILITY AND ACTIVITIES OF DAILY LIVING: ICD-10-CM

## 2021-08-12 DIAGNOSIS — N30.01 ACUTE CYSTITIS WITH HEMATURIA: Primary | ICD-10-CM

## 2021-08-12 LAB
BILIRUB SERPL-MCNC: ABNORMAL MG/DL
BLOOD URINE, POC: ABNORMAL
CLARITY, POC UA: ABNORMAL
COLOR, POC UA: YELLOW
GLUCOSE UR QL STRIP: NORMAL
KETONES UR QL STRIP: ABNORMAL
LEUKOCYTE ESTERASE URINE, POC: ABNORMAL
NITRITE, POC UA: ABNORMAL
PH, POC UA: 5
PROTEIN, POC: ABNORMAL
SPECIFIC GRAVITY, POC UA: 1.02
UROBILINOGEN, POC UA: NORMAL

## 2021-08-12 PROCEDURE — 99999 PR PBB SHADOW E&M-EST. PATIENT-LVL III: CPT | Mod: PBBFAC,,, | Performed by: NURSE PRACTITIONER

## 2021-08-12 PROCEDURE — 99214 OFFICE O/P EST MOD 30 MIN: CPT | Mod: S$PBB,,, | Performed by: NURSE PRACTITIONER

## 2021-08-12 PROCEDURE — 99213 OFFICE O/P EST LOW 20 MIN: CPT | Mod: PBBFAC,PO | Performed by: NURSE PRACTITIONER

## 2021-08-12 PROCEDURE — 99999 PR PBB SHADOW E&M-EST. PATIENT-LVL III: ICD-10-PCS | Mod: PBBFAC,,, | Performed by: NURSE PRACTITIONER

## 2021-08-12 PROCEDURE — 81002 URINALYSIS NONAUTO W/O SCOPE: CPT | Mod: PBBFAC,PO | Performed by: NURSE PRACTITIONER

## 2021-08-12 PROCEDURE — 87088 URINE BACTERIA CULTURE: CPT | Performed by: NURSE PRACTITIONER

## 2021-08-12 PROCEDURE — 87077 CULTURE AEROBIC IDENTIFY: CPT | Performed by: NURSE PRACTITIONER

## 2021-08-12 PROCEDURE — 87186 SC STD MICRODIL/AGAR DIL: CPT | Performed by: NURSE PRACTITIONER

## 2021-08-12 PROCEDURE — 87086 URINE CULTURE/COLONY COUNT: CPT | Performed by: NURSE PRACTITIONER

## 2021-08-12 PROCEDURE — 99214 PR OFFICE/OUTPT VISIT, EST, LEVL IV, 30-39 MIN: ICD-10-PCS | Mod: S$PBB,,, | Performed by: NURSE PRACTITIONER

## 2021-08-12 RX ORDER — SULFAMETHOXAZOLE AND TRIMETHOPRIM 800; 160 MG/1; MG/1
1 TABLET ORAL 2 TIMES DAILY
Qty: 10 TABLET | Refills: 0 | Status: SHIPPED | OUTPATIENT
Start: 2021-08-12 | End: 2021-08-17

## 2021-08-13 ENCOUNTER — HOSPITAL ENCOUNTER (EMERGENCY)
Facility: HOSPITAL | Age: 36
Discharge: HOME | End: 2021-08-13
Attending: EMERGENCY MEDICINE
Payer: MEDICAID

## 2021-08-13 VITALS
OXYGEN SATURATION: 99 % | RESPIRATION RATE: 18 BRPM | DIASTOLIC BLOOD PRESSURE: 59 MMHG | WEIGHT: 104 LBS | SYSTOLIC BLOOD PRESSURE: 118 MMHG | TEMPERATURE: 98 F | HEART RATE: 75 BPM | BODY MASS INDEX: 18.42 KG/M2

## 2021-08-13 DIAGNOSIS — T81.30XA WOUND DEHISCENCE: ICD-10-CM

## 2021-08-13 DIAGNOSIS — S01.111A EYEBROW LACERATION, RIGHT, INITIAL ENCOUNTER: Primary | ICD-10-CM

## 2021-08-13 LAB
B-HCG UR QL: NEGATIVE
CTP QC/QA: YES

## 2021-08-13 PROCEDURE — 25000003 PHARM REV CODE 250: Performed by: PHYSICIAN ASSISTANT

## 2021-08-13 PROCEDURE — 81025 URINE PREGNANCY TEST: CPT | Performed by: PHYSICIAN ASSISTANT

## 2021-08-13 PROCEDURE — 99285 EMERGENCY DEPT VISIT HI MDM: CPT | Mod: 25

## 2021-08-13 PROCEDURE — 25000003 PHARM REV CODE 250: Performed by: STUDENT IN AN ORGANIZED HEALTH CARE EDUCATION/TRAINING PROGRAM

## 2021-08-13 PROCEDURE — 12013 RPR F/E/E/N/L/M 2.6-5.0 CM: CPT

## 2021-08-13 RX ORDER — IBUPROFEN 600 MG/1
600 TABLET ORAL EVERY 6 HOURS PRN
Qty: 20 TABLET | Refills: 0 | Status: SHIPPED | OUTPATIENT
Start: 2021-08-13 | End: 2021-08-27

## 2021-08-13 RX ORDER — ACETAMINOPHEN 500 MG
1000 TABLET ORAL
Status: COMPLETED | OUTPATIENT
Start: 2021-08-13 | End: 2021-08-13

## 2021-08-13 RX ORDER — LIDOCAINE HYDROCHLORIDE AND EPINEPHRINE 10; 10 MG/ML; UG/ML
10 INJECTION, SOLUTION INFILTRATION; PERINEURAL ONCE
Status: DISCONTINUED | OUTPATIENT
Start: 2021-08-13 | End: 2021-08-13 | Stop reason: HOSPADM

## 2021-08-13 RX ADMIN — Medication 1 ML: at 05:08

## 2021-08-13 RX ADMIN — ACETAMINOPHEN 1000 MG: 500 TABLET ORAL at 04:08

## 2021-08-15 LAB — BACTERIA UR CULT: ABNORMAL

## 2021-08-16 ENCOUNTER — NURSE TRIAGE (OUTPATIENT)
Dept: ADMINISTRATIVE | Facility: CLINIC | Age: 36
End: 2021-08-16

## 2021-08-17 ENCOUNTER — TELEPHONE (OUTPATIENT)
Dept: INTERNAL MEDICINE | Facility: CLINIC | Age: 36
End: 2021-08-17

## 2021-08-17 DIAGNOSIS — N39.0 RECURRENT UTI: Primary | ICD-10-CM

## 2021-08-18 ENCOUNTER — PATIENT MESSAGE (OUTPATIENT)
Dept: INTERNAL MEDICINE | Facility: CLINIC | Age: 36
End: 2021-08-18

## 2021-08-18 ENCOUNTER — TELEPHONE (OUTPATIENT)
Dept: OBSTETRICS AND GYNECOLOGY | Facility: CLINIC | Age: 36
End: 2021-08-18

## 2021-08-18 DIAGNOSIS — N30.01 ACUTE CYSTITIS WITH HEMATURIA: Primary | ICD-10-CM

## 2021-08-18 RX ORDER — AMOXICILLIN AND CLAVULANATE POTASSIUM 500; 125 MG/1; MG/1
1 TABLET, FILM COATED ORAL 2 TIMES DAILY
Qty: 20 TABLET | Refills: 0 | Status: SHIPPED | OUTPATIENT
Start: 2021-08-18 | End: 2021-08-28

## 2021-08-19 ENCOUNTER — TELEPHONE (OUTPATIENT)
Dept: OBSTETRICS AND GYNECOLOGY | Facility: CLINIC | Age: 36
End: 2021-08-19

## 2021-08-24 ENCOUNTER — TELEPHONE (OUTPATIENT)
Dept: OBSTETRICS AND GYNECOLOGY | Facility: CLINIC | Age: 36
End: 2021-08-24

## 2021-09-20 ENCOUNTER — TELEPHONE (OUTPATIENT)
Dept: INTERNAL MEDICINE | Facility: CLINIC | Age: 36
End: 2021-09-20

## 2021-09-20 ENCOUNTER — TELEPHONE (OUTPATIENT)
Dept: UROGYNECOLOGY | Facility: CLINIC | Age: 36
End: 2021-09-20

## 2021-09-23 ENCOUNTER — PATIENT OUTREACH (OUTPATIENT)
Dept: ADMINISTRATIVE | Facility: OTHER | Age: 36
End: 2021-09-23

## 2021-09-24 ENCOUNTER — OFFICE VISIT (OUTPATIENT)
Dept: UROGYNECOLOGY | Facility: CLINIC | Age: 36
End: 2021-09-24
Payer: MEDICAID

## 2021-09-24 VITALS
DIASTOLIC BLOOD PRESSURE: 70 MMHG | WEIGHT: 104.06 LBS | HEIGHT: 63 IN | SYSTOLIC BLOOD PRESSURE: 110 MMHG | BODY MASS INDEX: 18.44 KG/M2

## 2021-09-24 DIAGNOSIS — N39.0 FREQUENT UTI: Primary | ICD-10-CM

## 2021-09-24 DIAGNOSIS — N39.46 MIXED STRESS AND URGE URINARY INCONTINENCE: ICD-10-CM

## 2021-09-24 DIAGNOSIS — N90.5 VULVAR ATROPHY: ICD-10-CM

## 2021-09-24 DIAGNOSIS — Z12.4 ENCOUNTER FOR SCREENING FOR CERVICAL CANCER: ICD-10-CM

## 2021-09-24 DIAGNOSIS — N81.89 PELVIC FLOOR WEAKNESS: ICD-10-CM

## 2021-09-24 LAB
BACTERIA #/AREA URNS AUTO: ABNORMAL /HPF
MICROSCOPIC COMMENT: ABNORMAL
RBC #/AREA URNS AUTO: 6 /HPF (ref 0–4)
SQUAMOUS #/AREA URNS AUTO: 1 /HPF
WBC #/AREA URNS AUTO: 1 /HPF (ref 0–5)

## 2021-09-24 PROCEDURE — 51701 PR INSERTION OF NON-INDWELLING BLADDER CATHETERIZATION FOR RESIDUAL UR: ICD-10-PCS | Mod: S$PBB,,, | Performed by: NURSE PRACTITIONER

## 2021-09-24 PROCEDURE — 51701 INSERT BLADDER CATHETER: CPT | Mod: PBBFAC | Performed by: NURSE PRACTITIONER

## 2021-09-24 PROCEDURE — 51701 INSERT BLADDER CATHETER: CPT | Mod: S$PBB,,, | Performed by: NURSE PRACTITIONER

## 2021-09-24 PROCEDURE — 87086 URINE CULTURE/COLONY COUNT: CPT | Performed by: NURSE PRACTITIONER

## 2021-09-24 PROCEDURE — 81001 URINALYSIS AUTO W/SCOPE: CPT | Performed by: NURSE PRACTITIONER

## 2021-09-24 PROCEDURE — 99999 PR PBB SHADOW E&M-EST. PATIENT-LVL IV: CPT | Mod: PBBFAC,,, | Performed by: NURSE PRACTITIONER

## 2021-09-24 PROCEDURE — 99999 PR PBB SHADOW E&M-EST. PATIENT-LVL IV: ICD-10-PCS | Mod: PBBFAC,,, | Performed by: NURSE PRACTITIONER

## 2021-09-24 PROCEDURE — 88175 CYTOPATH C/V AUTO FLUID REDO: CPT | Performed by: NURSE PRACTITIONER

## 2021-09-24 PROCEDURE — 99215 OFFICE O/P EST HI 40 MIN: CPT | Mod: 25,S$PBB,, | Performed by: NURSE PRACTITIONER

## 2021-09-24 PROCEDURE — 99215 PR OFFICE/OUTPT VISIT, EST, LEVL V, 40-54 MIN: ICD-10-PCS | Mod: 25,S$PBB,, | Performed by: NURSE PRACTITIONER

## 2021-09-24 PROCEDURE — 87624 HPV HI-RISK TYP POOLED RSLT: CPT | Performed by: NURSE PRACTITIONER

## 2021-09-24 PROCEDURE — 99214 OFFICE O/P EST MOD 30 MIN: CPT | Mod: PBBFAC | Performed by: NURSE PRACTITIONER

## 2021-09-24 RX ORDER — ESTRADIOL 0.1 MG/G
1 CREAM VAGINAL DAILY
Qty: 42.5 G | Refills: 3 | Status: SHIPPED | OUTPATIENT
Start: 2021-09-24 | End: 2023-05-24 | Stop reason: SDUPTHER

## 2021-09-24 RX ORDER — CLOBETASOL PROPIONATE 0.5 MG/G
OINTMENT TOPICAL DAILY
Qty: 30 G | Refills: 3 | Status: SHIPPED | OUTPATIENT
Start: 2021-09-24

## 2021-09-25 LAB — BACTERIA UR CULT: NO GROWTH

## 2021-09-30 ENCOUNTER — TELEPHONE (OUTPATIENT)
Dept: UROGYNECOLOGY | Facility: CLINIC | Age: 36
End: 2021-09-30

## 2021-09-30 LAB
FINAL PATHOLOGIC DIAGNOSIS: NORMAL
HPV HR 12 DNA SPEC QL NAA+PROBE: NEGATIVE
HPV16 AG SPEC QL: NEGATIVE
HPV18 DNA SPEC QL NAA+PROBE: NEGATIVE
Lab: NORMAL

## 2021-10-07 ENCOUNTER — TELEPHONE (OUTPATIENT)
Dept: UROGYNECOLOGY | Facility: CLINIC | Age: 36
End: 2021-10-07

## 2021-10-11 ENCOUNTER — OFFICE VISIT (OUTPATIENT)
Dept: PHYSICAL MEDICINE AND REHAB | Facility: CLINIC | Age: 36
End: 2021-10-11
Payer: MEDICAID

## 2021-10-11 VITALS
HEIGHT: 63 IN | DIASTOLIC BLOOD PRESSURE: 64 MMHG | BODY MASS INDEX: 18.43 KG/M2 | WEIGHT: 104 LBS | HEART RATE: 85 BPM | SYSTOLIC BLOOD PRESSURE: 102 MMHG

## 2021-10-11 DIAGNOSIS — R26.9 GAIT DISORDER: Primary | ICD-10-CM

## 2021-10-11 DIAGNOSIS — M62.838 MUSCLE SPASM: ICD-10-CM

## 2021-10-11 DIAGNOSIS — M54.42 CHRONIC MIDLINE LOW BACK PAIN WITH BILATERAL SCIATICA: ICD-10-CM

## 2021-10-11 DIAGNOSIS — R29.6 FREQUENT FALLS: ICD-10-CM

## 2021-10-11 DIAGNOSIS — G11.9 CEREBELLAR ATAXIA: ICD-10-CM

## 2021-10-11 DIAGNOSIS — M54.41 CHRONIC MIDLINE LOW BACK PAIN WITH BILATERAL SCIATICA: ICD-10-CM

## 2021-10-11 DIAGNOSIS — Z78.9 IMPAIRED MOBILITY AND ADLS: ICD-10-CM

## 2021-10-11 DIAGNOSIS — Z74.09 IMPAIRED MOBILITY AND ADLS: ICD-10-CM

## 2021-10-11 DIAGNOSIS — G89.29 CHRONIC MIDLINE LOW BACK PAIN WITH BILATERAL SCIATICA: ICD-10-CM

## 2021-10-11 PROCEDURE — 99999 PR PBB SHADOW E&M-EST. PATIENT-LVL III: ICD-10-PCS | Mod: PBBFAC,,, | Performed by: PHYSICAL MEDICINE & REHABILITATION

## 2021-10-11 PROCEDURE — 99215 PR OFFICE/OUTPT VISIT, EST, LEVL V, 40-54 MIN: ICD-10-PCS | Mod: S$PBB,,, | Performed by: PHYSICAL MEDICINE & REHABILITATION

## 2021-10-11 PROCEDURE — 99999 PR PBB SHADOW E&M-EST. PATIENT-LVL III: CPT | Mod: PBBFAC,,, | Performed by: PHYSICAL MEDICINE & REHABILITATION

## 2021-10-11 PROCEDURE — 99215 OFFICE O/P EST HI 40 MIN: CPT | Mod: S$PBB,,, | Performed by: PHYSICAL MEDICINE & REHABILITATION

## 2021-10-11 PROCEDURE — 99213 OFFICE O/P EST LOW 20 MIN: CPT | Mod: PBBFAC | Performed by: PHYSICAL MEDICINE & REHABILITATION

## 2021-10-11 RX ORDER — IBUPROFEN 600 MG/1
600 TABLET ORAL 2 TIMES DAILY PRN
Qty: 60 TABLET | Refills: 3 | Status: SHIPPED | OUTPATIENT
Start: 2021-10-11 | End: 2021-11-10

## 2021-10-11 RX ORDER — GABAPENTIN 300 MG/1
300 CAPSULE ORAL SEE ADMIN INSTRUCTIONS
Qty: 120 CAPSULE | Refills: 3 | Status: SHIPPED | OUTPATIENT
Start: 2021-10-11 | End: 2023-06-05

## 2021-10-12 ENCOUNTER — TELEPHONE (OUTPATIENT)
Dept: NEUROLOGY | Facility: CLINIC | Age: 36
End: 2021-10-12

## 2021-10-13 PROCEDURE — G0180 PR HOME HEALTH MD CERTIFICATION: ICD-10-PCS | Mod: ,,, | Performed by: PHYSICAL MEDICINE & REHABILITATION

## 2021-10-13 PROCEDURE — G0180 MD CERTIFICATION HHA PATIENT: HCPCS | Mod: ,,, | Performed by: PHYSICAL MEDICINE & REHABILITATION

## 2021-10-14 ENCOUNTER — IMMUNIZATION (OUTPATIENT)
Dept: PHARMACY | Facility: CLINIC | Age: 36
End: 2021-10-14
Payer: MEDICAID

## 2021-10-20 ENCOUNTER — EXTERNAL HOME HEALTH (OUTPATIENT)
Dept: HOME HEALTH SERVICES | Facility: HOSPITAL | Age: 36
End: 2021-10-20
Payer: MEDICAID

## 2021-10-22 ENCOUNTER — DOCUMENT SCAN (OUTPATIENT)
Dept: HOME HEALTH SERVICES | Facility: HOSPITAL | Age: 36
End: 2021-10-22
Payer: MEDICAID

## 2021-10-25 ENCOUNTER — DOCUMENT SCAN (OUTPATIENT)
Dept: HOME HEALTH SERVICES | Facility: HOSPITAL | Age: 36
End: 2021-10-25
Payer: MEDICAID

## 2021-11-04 DIAGNOSIS — G11.9 PRIMARY CEREBELLAR DEGENERATION: ICD-10-CM

## 2021-11-04 DIAGNOSIS — R26.9 ABNORMAL GAIT: Primary | ICD-10-CM

## 2021-11-04 DIAGNOSIS — G32.81 CEREBELLAR ATAXIA IN DISEASES CLASSIFIED ELSEWHERE: ICD-10-CM

## 2021-11-17 ENCOUNTER — OFFICE VISIT (OUTPATIENT)
Dept: UROGYNECOLOGY | Facility: CLINIC | Age: 36
End: 2021-11-17
Payer: MEDICAID

## 2021-11-17 VITALS
HEIGHT: 63 IN | DIASTOLIC BLOOD PRESSURE: 51 MMHG | BODY MASS INDEX: 18.42 KG/M2 | SYSTOLIC BLOOD PRESSURE: 95 MMHG | HEART RATE: 83 BPM

## 2021-11-17 DIAGNOSIS — R39.15 URINARY URGENCY: ICD-10-CM

## 2021-11-17 DIAGNOSIS — N81.89 PELVIC FLOOR WEAKNESS: ICD-10-CM

## 2021-11-17 DIAGNOSIS — R31.29 MICROSCOPIC HEMATURIA: Primary | ICD-10-CM

## 2021-11-17 DIAGNOSIS — N39.46 MIXED STRESS AND URGE URINARY INCONTINENCE: ICD-10-CM

## 2021-11-17 PROCEDURE — 99213 OFFICE O/P EST LOW 20 MIN: CPT | Mod: S$PBB,,, | Performed by: NURSE PRACTITIONER

## 2021-11-17 PROCEDURE — 99999 PR PBB SHADOW E&M-EST. PATIENT-LVL IV: CPT | Mod: PBBFAC,,, | Performed by: NURSE PRACTITIONER

## 2021-11-17 PROCEDURE — 99214 OFFICE O/P EST MOD 30 MIN: CPT | Mod: PBBFAC | Performed by: NURSE PRACTITIONER

## 2021-11-17 PROCEDURE — 99999 PR PBB SHADOW E&M-EST. PATIENT-LVL IV: ICD-10-PCS | Mod: PBBFAC,,, | Performed by: NURSE PRACTITIONER

## 2021-11-17 PROCEDURE — 99213 PR OFFICE/OUTPT VISIT, EST, LEVL III, 20-29 MIN: ICD-10-PCS | Mod: S$PBB,,, | Performed by: NURSE PRACTITIONER

## 2021-11-18 ENCOUNTER — DOCUMENT SCAN (OUTPATIENT)
Dept: HOME HEALTH SERVICES | Facility: HOSPITAL | Age: 36
End: 2021-11-18
Payer: MEDICAID

## 2021-11-23 ENCOUNTER — TELEPHONE (OUTPATIENT)
Dept: UROGYNECOLOGY | Facility: CLINIC | Age: 36
End: 2021-11-23
Payer: MEDICAID

## 2021-11-24 ENCOUNTER — OFFICE VISIT (OUTPATIENT)
Dept: UROGYNECOLOGY | Facility: CLINIC | Age: 36
End: 2021-11-24
Payer: MEDICAID

## 2021-11-24 VITALS
WEIGHT: 101.63 LBS | DIASTOLIC BLOOD PRESSURE: 58 MMHG | SYSTOLIC BLOOD PRESSURE: 92 MMHG | HEIGHT: 63 IN | BODY MASS INDEX: 18.01 KG/M2

## 2021-11-24 DIAGNOSIS — N39.46 MIXED STRESS AND URGE URINARY INCONTINENCE: ICD-10-CM

## 2021-11-24 DIAGNOSIS — R39.89 BLADDER PAIN: Primary | ICD-10-CM

## 2021-11-24 DIAGNOSIS — N81.89 PELVIC FLOOR WEAKNESS IN FEMALE: ICD-10-CM

## 2021-11-24 DIAGNOSIS — R39.15 URINARY URGENCY: Primary | ICD-10-CM

## 2021-11-24 DIAGNOSIS — R31.29 MICROSCOPIC HEMATURIA: ICD-10-CM

## 2021-11-24 DIAGNOSIS — G80.9 CEREBRAL PALSY, UNSPECIFIED TYPE: ICD-10-CM

## 2021-11-24 DIAGNOSIS — N90.5 VULVAR ATROPHY: ICD-10-CM

## 2021-11-24 LAB
BILIRUB SERPL-MCNC: NORMAL MG/DL
BLOOD URINE, POC: NORMAL
CLARITY, POC UA: CLEAR
COLOR, POC UA: NORMAL
GLUCOSE UR QL STRIP: NORMAL
KETONES UR QL STRIP: NORMAL
LEUKOCYTE ESTERASE URINE, POC: NORMAL
NITRITE, POC UA: NORMAL
PH, POC UA: 6
PROTEIN, POC: NORMAL
SPECIFIC GRAVITY, POC UA: 1.01
UROBILINOGEN, POC UA: NORMAL

## 2021-11-24 PROCEDURE — 99499 UNLISTED E&M SERVICE: CPT | Mod: S$PBB,,, | Performed by: OBSTETRICS & GYNECOLOGY

## 2021-11-24 PROCEDURE — 87086 URINE CULTURE/COLONY COUNT: CPT | Performed by: OBSTETRICS & GYNECOLOGY

## 2021-11-24 PROCEDURE — 99499 NO LOS: ICD-10-PCS | Mod: S$PBB,,, | Performed by: OBSTETRICS & GYNECOLOGY

## 2021-11-24 PROCEDURE — 52000 CYSTOURETHROSCOPY: CPT | Mod: PBBFAC | Performed by: OBSTETRICS & GYNECOLOGY

## 2021-11-24 PROCEDURE — 52000 CYSTOURETHROSCOPY: CPT | Mod: S$PBB,,, | Performed by: OBSTETRICS & GYNECOLOGY

## 2021-11-24 PROCEDURE — 99213 OFFICE O/P EST LOW 20 MIN: CPT | Mod: PBBFAC,25 | Performed by: OBSTETRICS & GYNECOLOGY

## 2021-11-24 PROCEDURE — 81002 URINALYSIS NONAUTO W/O SCOPE: CPT | Mod: PBBFAC | Performed by: OBSTETRICS & GYNECOLOGY

## 2021-11-24 PROCEDURE — 99999 PR PBB SHADOW E&M-EST. PATIENT-LVL III: CPT | Mod: PBBFAC,,, | Performed by: OBSTETRICS & GYNECOLOGY

## 2021-11-24 PROCEDURE — 99999 PR PBB SHADOW E&M-EST. PATIENT-LVL III: ICD-10-PCS | Mod: PBBFAC,,, | Performed by: OBSTETRICS & GYNECOLOGY

## 2021-11-24 PROCEDURE — 52000 PR CYSTOURETHROSCOPY: ICD-10-PCS | Mod: S$PBB,,, | Performed by: OBSTETRICS & GYNECOLOGY

## 2021-11-24 RX ORDER — LIDOCAINE HYDROCHLORIDE 20 MG/ML
JELLY TOPICAL ONCE
Status: COMPLETED | OUTPATIENT
Start: 2021-11-24 | End: 2021-11-24

## 2021-11-24 RX ORDER — SULFAMETHOXAZOLE AND TRIMETHOPRIM 800; 160 MG/1; MG/1
1 TABLET ORAL
Status: COMPLETED | OUTPATIENT
Start: 2021-11-24 | End: 2021-11-24

## 2021-11-24 RX ORDER — IBUPROFEN 600 MG/1
600 TABLET ORAL EVERY 6 HOURS PRN
Qty: 30 TABLET | Refills: 1 | Status: SHIPPED | OUTPATIENT
Start: 2021-11-24 | End: 2022-11-22 | Stop reason: SDUPTHER

## 2021-11-24 RX ADMIN — SULFAMETHOXAZOLE AND TRIMETHOPRIM 1 TABLET: 800; 160 TABLET ORAL at 08:11

## 2021-11-24 RX ADMIN — LIDOCAINE HYDROCHLORIDE 5 ML: 20 JELLY TOPICAL at 08:11

## 2021-11-25 PROBLEM — R39.15 URINARY URGENCY: Status: ACTIVE | Noted: 2021-11-25

## 2021-11-25 PROBLEM — R31.29 MICROSCOPIC HEMATURIA: Status: ACTIVE | Noted: 2021-11-25

## 2021-11-25 PROBLEM — N81.89 PELVIC FLOOR WEAKNESS IN FEMALE: Status: ACTIVE | Noted: 2021-11-25

## 2021-11-25 LAB — BACTERIA UR CULT: NO GROWTH

## 2021-11-29 ENCOUNTER — PATIENT MESSAGE (OUTPATIENT)
Dept: UROGYNECOLOGY | Facility: CLINIC | Age: 36
End: 2021-11-29
Payer: MEDICAID

## 2021-11-29 ENCOUNTER — HOSPITAL ENCOUNTER (OUTPATIENT)
Dept: RADIOLOGY | Facility: OTHER | Age: 36
Discharge: HOME | End: 2021-11-29
Attending: NURSE PRACTITIONER
Payer: MEDICAID

## 2021-11-29 DIAGNOSIS — R31.29 MICROSCOPIC HEMATURIA: ICD-10-CM

## 2021-11-29 PROCEDURE — 76770 US EXAM ABDO BACK WALL COMP: CPT | Mod: TC

## 2021-11-29 PROCEDURE — 76770 US EXAM ABDO BACK WALL COMP: CPT | Mod: 26,,, | Performed by: RADIOLOGY

## 2021-11-29 PROCEDURE — 76770 US RETROPERITONEAL COMPLETE: ICD-10-PCS | Mod: 26,,, | Performed by: RADIOLOGY

## 2021-12-03 ENCOUNTER — TELEPHONE (OUTPATIENT)
Dept: UROGYNECOLOGY | Facility: CLINIC | Age: 36
End: 2021-12-03
Payer: MEDICAID

## 2021-12-06 ENCOUNTER — PATIENT MESSAGE (OUTPATIENT)
Dept: UROGYNECOLOGY | Facility: CLINIC | Age: 36
End: 2021-12-06
Payer: MEDICAID

## 2021-12-06 DIAGNOSIS — R39.15 URINARY URGENCY: ICD-10-CM

## 2021-12-09 ENCOUNTER — PATIENT MESSAGE (OUTPATIENT)
Dept: UROGYNECOLOGY | Facility: CLINIC | Age: 36
End: 2021-12-09
Payer: MEDICAID

## 2021-12-09 ENCOUNTER — TELEPHONE (OUTPATIENT)
Dept: UROGYNECOLOGY | Facility: CLINIC | Age: 36
End: 2021-12-09
Payer: MEDICAID

## 2021-12-09 DIAGNOSIS — R39.15 URINARY URGENCY: Primary | ICD-10-CM

## 2021-12-09 RX ORDER — VIBEGRON 75 MG/1
75 TABLET, FILM COATED ORAL DAILY
Qty: 30 TABLET | Refills: 11 | Status: SHIPPED | OUTPATIENT
Start: 2021-12-09 | End: 2022-02-20

## 2021-12-17 ENCOUNTER — PATIENT MESSAGE (OUTPATIENT)
Dept: UROGYNECOLOGY | Facility: CLINIC | Age: 36
End: 2021-12-17
Payer: MEDICAID

## 2021-12-17 ENCOUNTER — TELEPHONE (OUTPATIENT)
Dept: NEUROLOGY | Facility: CLINIC | Age: 36
End: 2021-12-17
Payer: MEDICAID

## 2021-12-17 ENCOUNTER — TELEPHONE (OUTPATIENT)
Dept: UROGYNECOLOGY | Facility: CLINIC | Age: 36
End: 2021-12-17
Payer: MEDICAID

## 2021-12-17 DIAGNOSIS — R39.15 URINARY URGENCY: Primary | ICD-10-CM

## 2021-12-20 RX ORDER — SOLIFENACIN SUCCINATE 5 MG/1
5 TABLET, FILM COATED ORAL DAILY
Qty: 30 TABLET | Refills: 11 | Status: SHIPPED | OUTPATIENT
Start: 2021-12-20 | End: 2022-02-20

## 2022-01-05 ENCOUNTER — CLINICAL SUPPORT (OUTPATIENT)
Dept: REHABILITATION | Facility: HOSPITAL | Age: 37
End: 2022-01-05
Payer: MEDICAID

## 2022-01-05 DIAGNOSIS — Z74.09 IMPAIRED MOBILITY AND ACTIVITIES OF DAILY LIVING: Primary | ICD-10-CM

## 2022-01-05 DIAGNOSIS — Z78.9 IMPAIRED MOBILITY AND ACTIVITIES OF DAILY LIVING: Primary | ICD-10-CM

## 2022-01-05 PROCEDURE — 97161 PT EVAL LOW COMPLEX 20 MIN: CPT | Mod: PN

## 2022-01-05 NOTE — PLAN OF CARE
[facility]  Physical Therapy  Functional Mobility and Wheelchair Assessment    Date: 2022   Name: Medina Hernandez  Clinic Number: [id number]    Therapy Diagnosis:   Encounter Diagnosis   Name Primary?    Impaired mobility and activities of daily living Yes     Physician: No ref. 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 Castro ref. provider found for a functional mobility and custom wheelchair assessment due to impaired mobility, fall risk, B UE spasticity, UE weakness, low overall endurance and inability to propel herself >50 feet in manual standard w/c or ultralightweight w/c.   Prior Therapy: home health PT in     Medical History:   Past Medical History:   Diagnosis Date    Cerebral palsy     Gait abnormality     Low blood pressure      Surgical History:   Medina Hernandez  has a past surgical history that includes  section and Laparoscopic salpingectomy (Bilateral, 7/10/2020).    Medications:   Medina has a current medication list which includes the following prescription(s): acetaminophen, afluria qd -(3yr up)(pf), baclofen, calcium carbonate, clobetasol 0.05%, estradiol, gabapentin, ibuprofen, lidocaine hcl 2%, solifenacin, tramadol, valacyclovir, and gemtesa.    Allergies:   Review of patient's allergies indicates:  No Known Allergies     Patient height: 5'3''  Patient weight:    Wt Readings from Last 1 Encounters:   11/24/21 46.1 kg (101 lb 10.1 oz)      Is this a progressive disease? yes  Any recent weight changes or trends? No  Relevant future surgeries: No  Cardio status: intact  Respiratory status: intact   Does this patient have an amputation: No   Orthotic use: No    HOME ENVIRONMENT  Living environment: single family home single story home, 2 small steps to enter home  Social support: lives with their family  (, son, MIL, FAL)  Hours without assistance: 4-8  Home is accessible to patient: yes, WC/handicap accessible  Storage of wheelchair: in home     COMMUNITY  Transportation: van  Does patient sit in wheelchair during transport? no   Self-?  no  Employment and/or School - specific requirements related to mobility needs: none, patient is unemployed    COMMUNICATION   Verbal communication: Understandable  Language - primary:  English  Language - secondary: Thai  Communication provided by patient    CURRENT SEATING / MOBILITY:   Current Mobility Device: standard w/c, 2016, no cushion  , model, serial number and specs listed below if available.    DRIVE standard w/c  Age of current device (years): >5 years   Purchased by: insurance/patient  Current condition of mobility base: damaged and no longer appropriate due to significant change in medical status resulting in new impairments and functional status change  Current seating system: no longer appropriate due to significant change in medical status resulting in new impairments and functional status change  Age of seating system, if different from base (years):  N/A  Describe posture in present seating system: posterior pelvic tilt, not cushion, forward slump, hip adduction,   Is the current mobility device meeting medical necessity? No Explain, pt is unable to propel standard manual w/c >50 feet on even or uneven surfaces, pt's UE are too weak to propel ultralight weight w/c outdoors >150 feet.  Pt is not  indep in a standard or ultralightweight w/c    Prior Level of Function:   Current Level of Function: JEFFERY with basic ADLs, maxA to push manual w/c, Lana to ambulate over 10 feet in home, JEFFERY for transfers    Pain:  Current 0/10, worst 6/10, best 0/10   Location: bilateral legs   Worst at night  Pt's goals: Obtain power wheelchair for independent mobility in home and community.   Caregiver goals and specific limitations that may affect care:  wants patient to be able to be mobile in power w/c in home and community to be able to independently drive 1 block to  son from school to usher him home safely without assist of a 2nd adult     See face-sheet for patient contact and insurance information       Objective     MOBILITY / BALANCE  Sitting Balance Standing Balance Transfers Ambulation   Fair: Patient able to maintain balance with handhold support; may require occasional minimal assistance. Fair: Patient able to maintain balance with handhold support; may require occasional minimal assistance. independent with safety concerns non-functional ambulator - history/high risk of falls   Fall History:   Number of falls in last 6 months: >10  Number of near falls in last 6 months: >10 Transfer method: squat pivot   Able to perform w/c push-up for only 15 secs.    Ability to complete Mobility-Related Activities of Daily Living (MRADL's) with CURRENT Mobility Device  Move room to room minimal assist less than 25 feet MRADL's Comments: performs most MRADL's from sitting position   Meal preparation set up from w/c level    Feeding independent with increased time    Bathing minimal assist    Grooming independent with assistive device and increased time    Upper Extremity Dressing set up sitting EOB    Lower Extremity Dressing set up sitting EOB    Toileting minimal assist with increased time    Bowel Management: continent   Bladder Management: continent     Current Functional Mobility Equipment Skills      Cane/Crutches Does not meet mobility needs due to:, Risk of falling or History of falls, Safety concerns with physical ability, Decreased / limitations in endurance & strength and Pace / Speed   Walker / Rollator Does not meet mobility needs due to:, Risk of falling or History of falls, Environmental limitations, Safety concerns with physical ability, Decreased / limitations in endurance & strength, Pain and Pace / Speed   Manual Wheelchair - Does not meet mobility needs due to:, Environmental limitations, Safety concerns with physical ability, Decreased / limitations in endurance & strength, Pain and Pace / Speed   Manual Wheelchair with Power Assist () Does not meet mobility needs due to:, Environmental limitations, Safety concerns with physical ability, Decreased / limitations in endurance & strength, Pain and Pace / Speed   Scooter Does not meet mobility needs due to:, Risk of falling or History of falls, Environmental limitations and Safety concerns with physical ability   Power Wheelchair: standard joystick Meets needs for safe Independent functional ambulation / mobility   Power Wheelchair: alternative controls Not applicable   Summary: least costly alternative for independent functional mobility was found to be: power wheelchair     Functional Processing Skills for Wheeled Mobility        Processing skills are adequate for safe mobility: yes  Patient is willing and motivated to use recommended mobility equipment: yes  Patient is UNABLE to safely operate mobility equipment independently and requires dependent care mobility: no       PATIENT MEASUREMENTS (inches)    1 31 seat to top of head    2 21.5 seat to shoulder left and right    3 16.5 seat to axilla left and right    4 6.5 seat to 90 degree elbow left and right    5 19.5 seat depth    6 9.5 foot length left and right    7 6 head width    8 14 shoulder width    9 10.5 chest width    10 15 hip width    11 18 floor to seat left    12 18  floor to seat right   Comments: Pt is maxA in standard, lightweight and ultralightweight w/c's and she lacks sitting balance and postural control for a scooter        SENSATION and SKIN ISSUES    Sensation: impaired Location(s) of sensation impairment: B LE and UE    Pressure Relief Methods: lean side to side to offload (without risk of falling) and wheelchair push up (4+ times/hour for 15+ seconds) Effective pressure relief method(s) above can be performed consistently throughout the day: no due to UE fatigue, patient was able to only perform this once in a day     Skin Issues/Skin Integrity - Current skin issues:  red area         History of skin issues:   No   History of skin flap surgeries:   No   PAIN  6/10   How does pain interfere with mobility and/or MRADLs? Must perform task from sitting and due to shoulder and UE pain she can not endure driving a scooter chair and would benefit from power seating with joystick for safe and efficient mobility       Clark Scale for Predicting Pressure Sore Risk   Lainey Rodas & Brittnee Cisneros, Copyrighted 1988.  Risk Factor Score / Description   Sensory Perception; ability to respond meaningfully to pressure-related discomfort 4. No Impairment; Responds to verbal commands. Has no sensory deficit which would limit ability to feel or voice pain or discomfort.    Moisture; degree to which skin is exposed to moisture 4. Rarely Moist; skin is usually dry; linen only requires changing at routine intervals.    Activity; degree of physical activity 3. Walks Occasionally; walks occasionally during the day, but for very short distances, with or without assistance. Spends majority of each shift in bed or chair.    Mobility; ability to change and control body position 3. Slightly Limited; Makes frequent though slight changes in body or extremity position independently.    Nutrition; Usual food intake pattern  1NPO: nothing by mouth  2IV: Intravenously  3TPN: total parenteral  nutrition 3. Adequate; Eats over half of most meals. Eats a total of 4 servings of protien (meat, dairy products) each day. Occasionally refuses a meal but will usually take a supplement if offered, OR is on a tube feeding or TPN3 regimen, which probably meets most of nutritional needs   Friction and Shear 2. Potential Problem; Moves feebly or requires a minimum assistance. During a move, skin probably slides to some extent against sheets, chair, restraints or other devices. Maintains relatively good position in chairor bed most of the time but occassionally slides down.    Total: No risk  Very High Risk 6-9  High Risk 10-12  Moderate Risk 13-14  Mild Risk 15-18  No Risk 19-23         MAT EVALUATION    Neuro-Muscular Status (tone, reflexive, responses, etc.): Spasticity and ataxia B UE/ LE due to CP and cerebellar disease    Pelvis     posterior; flexible - self correction       Right obliquity (left elevated); flexible - self correction       Right anterior; flexible - self correction      Tonal Influence at Pelvis: Spasticity   Trunk     increased thoracic kyphosis; tendency away from neutral       Convex Right; tendency away from neutral        Right - anterior; flexible - self correction       Tonal Influence at Trunk: Spasticity   Head & Neck functional Good head control Tone/Movement of head and neck comments: WFL      Hips     abducted; flexible - self correction        neutral   Tone/Movement of lower extremities:  Spasticity  Edema: none          Lower Extremity Passive Range of Motion     ROM   Hip Flexion WFLs     Hip Extension WFLs, PROM ~15 degrees but pain reported with extension   Hip Abduction Left = 15*  Right = 12*   Hip Adduction WNLs   Knee Extension WNL   Knee Flexion    Ankle Dorsiflexion limited as follows:  Dorsiflexion: Left = 3*, Right = 5*  WNL   Ankle Plantarflexion        Lower Extremity Strength  Right LE  Left LE    Hip flexion: 3-/5 Hip flexion: 3-/5   Hip extension:  3-/5 Hip  extension: 3-/5   Hip abduction: 2/5 Hip abduction: 2/5   Hip adduction: 2/5 Hip adduction 2/5   Knee extension: 3-/5 Knee extension: 3-/5   Knee flexion: 3/5 Knee flexion: 3-/5   Ankle dorsiflexion: 4-/5 Ankle dorsiflexion: 4-/5   Ankle plantarflexion: 4/5 Ankle plantarflexion: 4/5     Upper Extremity Shoulder Posture:  Functional -WFL bilateral  Elevation-WFL bilateral  Depression- WFL bilateral  Protraction - WFL bilateral  Retraction - WFL bilateral  Int-rotation - WFL bilateral  Ext-rotation - WFL bilateral     Tone/Movement of upper extremities:   Dystonia    Edema: none           Wrist & Hand Handedness: right   Hand Limitations:  WNL -bilateral  Weak grasp -bilateral       Upper Extremity Range of Motion     ROM   Shoulder Flexion WFLs   Shoulder Abduction WFLs   Shoulder Adduction  WFLs   Elbow Flexion WFLs   Elbow Extension WFLs   Wrist Flexion   WFLs   Wrist Extension WFLs   Pinch Strength Right: WFL   Left: WFL    Strength Right: WFL but low ms endurance  Left: WFL but low ms endurance     Upper Extremity Strength  (R) UE  (L) UE    Shoulder flexion: 3-/5 Shoulder flexion: 3-/5   Shoulder Abduction: 3-/5 Shoulder Abduction: 3-/5   Shoulder Adduction: 3-/5 Shoulder Adduction: 3-/5   Elbow flexion: 3+/5 Elbow flexion: 3+/5   Elbow extension: 3+/5 Elbow extension: 3+/5   Wrist flexion: 3-/5 Wrist flexion: 3-/5   Wrist extension: 3-/5 Wrist extension: 3-/5       Mobility Base Recommendations and Justification    Mobility Base Recommendation: Power mobility base  Justification for decision: is not a safe, functional ambulator, limitation prevents from completing a MRADL(s) within a reasonable timeframe, limitation places at high risk of morbidity or mortality secondary to the attempts to perform a MRADL(s), provide independent mobility, equipment is a lifetime medical need, walker or cane inadequate, any type manual wheelchair inadequate , scooter/POV inadequate and patient is not for manual w/c or  scooter mobility in the community and she is young with a son that is 8 years old that she plan to be able to independently mobilize ourside her home one block away in her power chair independently to usher him to and from school while her other caregivers are at work.   Number of Hours per day in above selected mobility base: 4-8    Component Recommendations and Justification  Should include but not be limited to:   POWER MOBILITY    [] Scooter/POV  [] can safely operate   [] can safely transfer   [] has adequate trunk stability   [] functionally propel manual wheelchair    [x] Power mobility base  [] non-ambulatory   [x] cannot functionally propel manual wheelchair   [x] cannot functionally and safely operate scooter/POV   [x] can safely operate power wheelchair   [x] home is accessible   [x] willing to use power wheelchair    Tilt   [] Powered tilt on powered chair   [] Powered tilt on manual chair   [] Manual tilt on manual chair   Comments:  [] change position for pressure relief/cannot weight shift  [] change position against gravitational force on head and shoulders   [] decrease pain   [] blood pressure management   [] control autonomic dysreflexia   [] decrease respiratory distress   [] management of spasticity   [] management of low tone   [] facilitate postural control   [] rest periods   [] control edema   [] increase sitting tolerance   [] aid with transfers    Recline   [] Power recline on power chair   [] Manual recline on manual chair   Comments:  [] intermittent catheterization   [] manage spasticity   [] accommodate femur to back angle   [] change position for pressure relief/cannot weight shift   [] high risk of pressure sore development   [] tilt alone does not accomplish effective pressure relief   [] difficult to transfer to and from bed  [] rest periods and sleeping in chair   [] repositioning for transfers   [] bring to full recline for ADL care   [] clothing/diaper changes in chair   []  gravity PEG tube feeding   [] head positioning   [] decrease pain   [] blood pressure management   [] control autonomic dysreflexia   [] decrease respiratory distress   [] user on ventilator    Elevator on mobility base   [] Power wheelchair   [] Scooter  [] increase Indep in transfers   [] increase Indep in ADLs   [] bathroom function and safety   [] kitchen/cooking function and safety   [] shopping   [] raise height for communication at standing level   [] raise height for eye contact which reduces cervical neck strain and pain   [] drive at raised height for safety and navigating crowds    [] Vertical position system (anterior tilt) (Drive locks-out)   [] Stand (Drive enabled)  [] independent weight bearing   [] decrease joint contractures   [] decrease/manage spasticity   [] decrease/manage spasms   [] pressure distribution away from scapula, sacrum, coccyx, and ischial tuberosity   [] increase digestion and elimination   [] access to counters and cabinets   [] increase reach   [] increase interaction with others at eye level, reduces neck strain   [] increase performance of MRADL(s)    Power elevating legrest   [] Center mount (Single)  degrees  [] Standard (Pair) 100-170 degrees [] position legs at 90 degrees, not available with std power ELR   [] center mount tucks into chair to decrease turning radius in home, not available with std power ELR   [] provide change in position for LE   [] elevate legs during recline   [] maintain placement of feet on footplate  [] decrease edema   [] improve circulation   [] actuator needed to elevate legrest   [] actuator needed to articulate legrest preventing knees from flexing   [] Increase ground clearance over curbs  [] STD (pair) independently elevate legrest   POWER WHEELCHAIR CONTROLS    Controls/input device   [x] Right Hand  [] Left Hand  [] Expandable   [] Non-expandable   [] Proportional   [] Non-proportional/switches/ head-array   [] Electrical/proximity  []  Mechanical    [] provides access for controlling wheelchair   [] programming for accurate control   [] progressive disease/changing condition   [] required for alternative drive controls   [] lacks motor control to operate proportional drive control   [] unable to understand proportional controls   [] limited movement/strength   [] extraneous movement / tremors / ataxic / spastic   [] Upgraded electronics controller/harness   [] Single power (tilt or recline)   [] Expandable   [] Non-expandable plus   [] Multi-power (tilt, recline, power legrest, power seat lift, vertical positioning system, stand)  [] allows input device to communicate with drive motors   [] harness provides necessary connections between the controller, input device, and seat functions   [] needed in order to operate power seat functions through joystick/ input device   [] required for alternative drive controls    [] Enhanced display [] required to connect all alternative drive controls   [] required for upgraded joystick (lite-throw, heavy duty, micro)  [] Allows user to see in which mode and drive the wheelchair is set; necessary for alternate controls    [] Upgraded tracking electronics [] correct tracking when on uneven surfaces   [] makes switch driving more efficient and less fatiguing   [] increase safety when driving  [] increase ability to traverse thresholds    [] Safety / reset / mode switches   Type:  [] Used to change modes and stop the wheelchair when driving    [] Mount for joystick / input device/switches  [] swing away for access or transfers  [] attaches joystick / input device / switches to wheelchair  [] provides for consistent access  [] midline for optimal placement   [] Attendant controlled joystick plus mount  [] safety   [] long distance driving   [] operation of seat functions   [] compliance with transportation regulations    [x] Battery  [x] required to power (power assist / scooter/ power wc / other):    [] Power  inverter (24V to 12V) [] required for ventilator / respiratory equipment / other:       CHAIR OPTIONS MANUAL & POWER   Armrests   [] adjustable height [] removable [] swing away[] fixed   [x] flip back [] reclining   [] full length pads [x] desk [] tube arms   [] gel pads  [x] provide support with elbow at 90  [x] remove/flip back/swing away for transfers   [] provide support and positioning of upper body   [x] allow to come closer to table top   [] remove for access to tables   [] provide support for w/c tray   [] change of height/angles for variable activities   [] Elbow support / Elbow stop  [] keep elbow positioned on arm pad   [] keep arms from falling off arm pad during tilt and/or recline    Upper Extremity Support   [] Arm trough    [] Right [] Left [] Bilateral  Style:   [] swivel mount [] fixed mount   [] posterior hand support   [] ½ tray   [] full tray -joystick cut out  [] Right [] Left [] Bilateral  Style:  [] decrease gravitational pull on shoulders   [] provide support to increase UE function   [] provide hand support in natural position   [] position flaccid UE   [] decrease subluxation   [] decrease edema   [] manage spasticity   [] provide midline positioning   [] provide work surface   [] placement for AAC/Computer/EADL   Hangers/ Legrests   [] ____ degree   [] elevating   [] articulating   [] swing away   [] fixed   [] lift off   [] heavy duty   [] adjustable knee angle   [] adjustable calf panel   [] longer extension tube  [] provide LE support   [] maintain placement of feet on footplate  [] accommodate lower leg length   [] accommodate to hamstring tightness   [] enable transfers   [] provide change in position for LE's   [] elevate legs during recline   [] decrease edema   [] durability    Foot support   [x] footplate   [] Right [] Left [x] Bilateral  [x] flip up  [] depth adjustable   [] angle adjustable  [] foot board/one piece  [x] provide foot support   [x] accommodate to ankle ROM   []  allow foot to go under wheelchair base   [x] enable transfers    [] Shoe holders  [] position foot   [] decrease / manage spasticity   [] control position of LE   [] stability   [] safety    [] Ankle strap/heel loops  [] support foot on foot support   [] decrease extraneous movement   [] provide input to heel   [] protect foot   [] Amputee adapter   [] Right [] Left [] Bilateral  Style: ___ Size: ___  [] Provide support for stump/residual extremity   [] Transportation tie-down [] to provide crash tested tie-down brackets    [] Crutch/cane carter   [] O2 carter   [] IV   [] Ventilator tray/mount  [] stabilize accessory on wheelchair   [x] Seat cushion  [] accommodate impaired sensation   [] decubitus ulcers present or history   [] unable to shift weight   [x] increase pressure distribution   [x] prevent pelvic extension   [x] stabilize/promote pelvis alignment   [x] stabilize/promote femur alignment   [x] accommodate obliquity   [x] accommodate multiple deformity   [] incontinent/accidents   [x] low maintenance   [] seat tanya   [] fixed   [] removable [] attach seat platform/cushion to wheelchair frame   [] Seat wedge  [] provide increased aggressiveness of seat shape to decrease sliding down in the seat   [] accommodate ROM    [] Cover replacement  [] protect back or seat cushion   [] incontinent/accidents   [] Solid seat / insert  [] support cushion to prevent hammocking   [] allows attachment of cushion to mobility base    [] Lateral pelvic/thigh/hip support (Guides)  [] decrease abduction   [] accommodate pelvis   [] position upper legs   [] accommodate spasticity   [] removable for transfers     [] Lateral pelvic/thigh supports tanya  [] fixed   [] swing-away   [] removable   [] tanya lateral pelvic/thigh supports   [] tanya lateral pelvic/thigh supports swing-away or removable for transfers   [] Medial thigh support ([place]mel)  [] decrease adduction   [] accommodate ROM   [] alignment  [] remove for  transfers     [] Medial thigh support tanya   [] fixed   [] swing-away  [] removable   [] tanya medial thigh supports  [] tanya medial supports swing- away or removable for transfers   [x] Back  [x] provide posterior trunk support   [x] provide lumbar/sacral support   [] support trunk in midline  [x] provide lateral trunk support   [] accommodate or decrease tone   [] facilitate tone   [x] accommodate deformity   [] custom required off-the-shelf back support will not accommodate deformity    [] Back tanya  [] fixed   [] removable [] attach back rest/cushion to wheelchair frame   [] Lateral trunk supports    [] Right [] Left [] Bilateral [] decrease lateral trunk leaning   [] accommodate asymmetry   [] contour for increased contact   [] safety   [] control of tone    [] Lateral trunk supports tanya  [] fixed   [] swing-away   [] removable [] tanya lateral trunk supports   [] tanya lateral trunk supports swing away or removable for transfers   [] Anterior chest strap, vest  [] decrease forward movement of shoulder   [] decrease forward movement of trunk   [] safety/stability   [] added abdominal support   [] trunk alignment   [] assistance with shoulder control   [] decrease shoulder elevation    [] Headrest  [] provide posterior head support   [] provide posterior neck support   [] provide lateral head support   [] provide anterior head support   [] support during tilt and recline   [] improve feeding   [] improve respiration   [] placement of switches   [] safety   [] accommodate ROM   [] accommodate tone   [] improve visual orientation    [] Headrest mounting hardware  [] fixed   [] removable   [] flip down   [] swing-away laterals/switches [] mount headrest   [] tanya headrest flip down or removable for transfers  [] mount headrest swing-away laterals   [] mount switches    [] Neck Support  [] decrease neck rotation   [] decrease forward neck flexion    [] Pelvic Positioner   [] std hip belt   [] padded  hip belt   [] dual pull hip belt   [] four point hip belt  [] stabilize tone   [] decrease falling out of chair   [] prevent excessive extension   [] special pull angle to control rotation   [] pad for protection over dayanna prominence   [] promote comfort    [] Essential needs bag/pouch  [] medicines [] special food [] orthotics [] clothing changes   [] diapers [] catheter/hygiene [] ostomy supplies            The above equipment has a life- long use expectancy. Growth and changes in medical and/or functional conditions would be the exceptions.           FOTO assessment not performed, w/c eval only    *This functional mobility and wheelchair assessment form has been adapted with permission from Bern[facility] Mendo[facility].     TREATMENT   No treatment indicated    Home Exercises and Patient Education Provided    Education provided: Process of obtaining custom wheelchair, need for performing pressure relief for at least 15 secs every 30 minutes while sitting    Assessment     Medina is a 36 y.o. female referred to outpatient Physical Therapy with a medical diagnosis of Cerebellar disease, CP, impaired mobility and for custom power wheelchair evaluation.  She is unsafe to ambulate more that ~10-25 feet with assist in the home and she is often home alone and if she had a power chair she would be able to be indep in home safely from power w/c in the home and community and this would decrease the burden of care of her family and increase her level of ability to care for her 8 year old son.      Patient has mobility limitation that significantly impairs safe, timely, consistent participation in one or more MRADL. Yes  A mobility assistive device will effectively improve ability to participate in or aid participation in MRADL's.  Yes   A cane or walker will provide patient the ability to safely and consistently perform MRADLs in a timely manner  No  The patient's home environment will support use of recommended mobility device.  Yes  The patient has sufficient UE strength to effectively self propel a manual wheelchair for all MRADL's. No  The patient has sufficient upper extremity strength, , transfer ability and trunk stability to safely operate a POV (scooter). No  The additional benefits of a power wheelchair will more effectively enable MRADL's in home. Yes   The patient demonstrates ability/potential ability to use recommended equipment. Yes  The patient is willing and motivated to use the recommended equipment. Yes      Pt prognosis is Good.   Pt will benefit from skilled outpatient Physical Therapy to address the deficits stated above and in the chart below, provide pt/family education, and to maximize pt's level of independence.     Plan of care discussed with patient: Yes  Pt's spiritual, cultural and educational needs considered and patient is agreeable to the plan of care and goals as stated below:     PT Medical Necessity is demonstrated by the following:    History  Co-morbidities and personal factors that may impact the plan of care Co-morbidities:   Cerebral palsy  Cerebellar disease   Gait abnormality   Low blood pressure     Personal Factors:   lifestyle     moderate   Examination  Body Structures and Functions, activity limitations and participation restrictions that may impact the plan of care Body Regions:   head  neck  back  lower extremities  upper extremities  trunk    Body Systems:    gross symmetry  ROM  strength  gross coordinated movement  balance  gait  transfers  transitions  motor control    Participation Restrictions:   Pushing a manual w/c in home or community, driving a scooter safely, ambulating in home safely >25 feet without assist or high risk of falls    Activity limitations:   Learning and applying knowledge  no deficits    General Tasks and Commands  undertaking multiple tasks    Communication  no deficits    Mobility  lifting and carrying objects  walking    Self care  looking after one's  health  Toileting, bathing  Domestic Life  shopping  cooking  doing house work (cleaning house, washing dishes, laundry)  assisting others    Interactions/Relationships  family relationships    Life Areas  employment    Community and Social Life  community life  recreation and leisure         moderate   Clinical Presentation evolving clinical presentation with changing clinical characteristics due to high fall risk moderate   Decision Making/ Complexity Score: low     Goals:  Short Term Goals: 1 weeks  Patient will verbalize knowledge and understanding of W/C evaluation process.   Patient will verbalize knowledge and understanding of purpose, function, and functional benefits of recommended W/C.    Plan   Plan of care Certification: 1/5/2022 to 1/14/2021.    0 follow up session recommended, PT will consult with ATP as needed     Leeanna Corona, PT  1/5/2022  Therapist contact phone number: [phone]   As the evaluating therapist, I hereby attest that I have personally completed this evaluation and that I am not an employee of or working under contract to the (s) or the provider(s) of the durable medical equipment recommended in my evaluation. I further attest that I have not and will not receive remuneration of any kind from the (s) or the durable medical equipment provider(s) for the equipment I have recommended with this evaluation.     The following ATP was present and participated in this evaluation:   Nitin Flores, ATP from [employer] and mobility vendor.   Vendor phone: [phone]    I concur with the above findings and recommendations of the therapist:      Physician: Shiloh Archuleta MD        Physician signature:___________________________________   date: ___________

## 2022-01-12 ENCOUNTER — CLINICAL SUPPORT (OUTPATIENT)
Dept: REHABILITATION | Facility: HOSPITAL | Age: 37
End: 2022-01-12
Attending: OBSTETRICS & GYNECOLOGY
Payer: MEDICAID

## 2022-01-12 DIAGNOSIS — R39.15 URINARY URGENCY: ICD-10-CM

## 2022-01-12 DIAGNOSIS — G80.9 CEREBRAL PALSY, UNSPECIFIED TYPE: ICD-10-CM

## 2022-01-12 DIAGNOSIS — R10.2 PELVIC PAIN: ICD-10-CM

## 2022-01-12 DIAGNOSIS — R32 URINARY INCONTINENCE, UNSPECIFIED TYPE: ICD-10-CM

## 2022-01-12 PROCEDURE — 97530 THERAPEUTIC ACTIVITIES: CPT | Mod: PO

## 2022-01-12 PROCEDURE — 97163 PT EVAL HIGH COMPLEX 45 MIN: CPT | Mod: PO

## 2022-01-12 NOTE — PATIENT INSTRUCTIONS
Home Exercise Program: 01/12/2022    BLADDER RETRAINING    The goal of bladder retraining is to return you to a more normal and convenient pattern of urinating. People who experience urinary urgency, frequency, excessive nighttime urinating and urinary leakage can show improvement with this retraining technique. Bladder retraining helps restore your bladder capacity to normal. The program includes education about bladder function, urge control, record keeping, and following a schedule of urinating (voluntarily emptying your bladder). The success of the program depends on your effort to consistently keep a specific schedule and to have follow-up appointments with your health care provider.    THE RETRAINING PROGRAM   Each morning upon arising, go to the toilet and completely empty your bladder.  Your voiding schedule will begin upon getting out of bed and end at bedtime.   Your voiding schedule is every 3 hours.  Follow this interval as closely as possible. The important part of the retraining is that you practice telling your bladder when to empty and when to hold.   Go to the toilet at the scheduled time even if you do not feel the need to urinate. The amount you urinate is not important. It is important to relax and not strain or push while voiding.   If you feel the need to urinate before the scheduled time, that is okay. Try to fully empty and reset your timer.   If you have to interrupt the schedule, get back on schedule at the assigned time for the next void.    PROGRESSING THE PROGRAM   The goal is to go 3-4 hours between urinating.   You will change the time between urination by minute/hour intervals.     Your daily log will help with recording progress and determining the next retraining interval.  Be sure to bring the log to all your appointments.      TIPS FOR CONTROLLING THE URGE TO URINATE   Mental distraction techniques including visualization of your favorite vacation spot, counting  "backwards, deep breathing or positive self-thoughts, for example, I can control my bladder will help control the urge.   Pressure to the perineal area helps control the urge.  Place your hand or a rolled up towel against the crotch of your underwear and apply firm pressure.  Alternatively, sit on a rolled up towel placed on a firm chair.   Never rush or run to get to the toilet when it's time to go.  Always feel "in control" when you stand up to go to the toilet.    TIPS FOR SUCCESS   Avoid foods and beverages that irritate your bladder.  See the handout How Diet May Affect Your Bladder for a bladder irritant list.   Drink at least 4-8 glasses (8 ounces) of water each day. Taper your intake 3 hours before bed time.   Maintain regular bowel habits.  If you are constipated, add fiber to your diet.    DEALING WITH PROBLEMS AND SET BACKS  Setbacks are not uncommon if you have been ill with a cold or flu, are tired, cannot completely concentrate on the program, feel nervous or tense, are sensitive to cold weather or the sound of running water, or are about to start your menstrual period.    BLADDER HEALTH      WHAT IS CONSIDERED NORMAL?   The average bladder can hold about 2 cups of urine before it needs to be emptied.   The normal range of voiding urine is 6 to 8 times during a 24 hour period, or every 3-4 hours. As we get older, our bladder capacity can get smaller and we may need to pass urine more frequently but usually not more than every 2 hours.   Urine should flow easily without discomfort in a good, steady stream until the bladder is empty. No pushing or straining is necessary to empty the bladder.   An urge is a normal signal that you feel as the bladder stretches to fill with urine. Urges can be felt even if the bladder is not full. Urges are not commands to go to the toilet, merely a signal and can be controlled.    WHAT ARE GOOD BLADDER HABITS?   Take your time when emptying your bladder. Dont " strain or push to empty your bladder. Make sure you empty your bladder completely each time you pass urine. Do not rush the process.    Dont wait too long - consistently ignoring the urge to go (waiting more than 4 hours between toileting) or urinating too infrequently may be convenient but not healthy for your bladder. You can actually overstretch your bladder beyond its normal size.    Dont go too often - avoid going to the toilet just in case (more often than every 2 hours). It is usually not necessary to go when you feel the first urge. Try to go only when your bladder is full. Dont let your bladder control your life.    DIET CAN AFFECT THE BLADDER   Maintain a healthy fluid intake. Many people decrease their intake of liquids in hopes that they will need to urinate less frequently or have less urinary leakage. While a decrease in liquid intake does result in a decrease in the volume of urine, the smaller amount of urine may be more highly concentrated. Highly concentrated, dark yellow urine is irritating to the bladder surface and may actually cause you to go to the bathroom more frequently. It also encourages the growth of bacteria, which may lead to infections resulting in incontinence.  Depending on your body size and environment, drink 4-8 cups (8 oz each) of fluid per day, spread throughout the entire day, unless otherwise advised by your doctor.     Avoid of use the following acidic food/beverages in moderation:   Alcoholic beverages     Tomato-based products   Vinegar   Coffee (regular and decaf)   Tea (regular and decaf)   Carreon   Citrus fruits and juices   Spicy foods   Caffeinated beverages   Cola   Milk   Food colorings and flavorings   Artificial sweeteners   Chocolate     Try using these dietary substitutions:   Water: grape juice, apple juice   Fruit: pears, apricots, papaya, watermelon   Coffee: KAVA®, Postum®, Clifton®, Kaffree Nathalie®   Tea: Non-citrus herbal, sun-brewed  tea   Vitamin C: Calcium carbonate co-buffered with calcium ascorbate     Avoid constipation by maintaining a balanced diet of dietary fiber. Typical dietary recommendations for fiber are between 25-35 grams per day. You should discuss your fiber needs with your physician, pharmacist or nutritionist.          360 Breath - Inhale long, slow and deep. You should feel as if your lower ribs are expanding in all directions like the way an umbrella opens. You should feel the belly, back and sides gently expand and you may notice a relaxation in the pelvic floor.     Continue to breath like this for 3 minutes. Repeat 1 times/day, and anytime you are on the toilet    Bowel Movement Body Mechanics  1. Sit on the toilet comfortably with legs and buttocks relaxed.  2. Put your feet on a step stool or squatty potty (8 inches tall).  3. Lean forward while keeping your back straight and rest your elbows on your knees.  4. Keep your knees apart.  5. Exhale like you are blowing out birthday candles while you gently bear down.     Do not strain and Do not hold your breath.

## 2022-01-13 NOTE — PLAN OF CARE
[facility]  Pelvic Health Physical Therapy Initial Evaluation    Date: 2022   Name: Medina Hernandez  Clinic Number: [id number]  Therapy Diagnosis:   Encounter Diagnoses   Name Primary?    Urinary urgency     Cerebral palsy, unspecified type     Pelvic pain     Urinary incontinence, unspecified type      Physician: Jonas Nettles MD    Physician Orders: Pelvic PT Eval and Treat  Medical Diagnosis from Referral: Urinary urgency [R39.15], Cerebral palsy, unspecified type [G80.9]  Evaluation Date: 2022  Authorization Period Expiration: 22  Plan of Care Expiration: 22  Visit # / Visits authorized:     Time In: 10:40a  Time Out: 11:30a  Total Appointment Time (timed & untimed codes): 50 minutes    Precautions: universal    Subjective     Date of onset: 8-9 months    History of current condition - Medina reports: She had a  in . Her current issues have worsened over the last 8-9 months. Her main complaint is urinary urgency and leakage. Also struggles with constipation and fecal urgency. Primary mode of ambulation standard wheelchair Rob. Rob with toileting. Describes burning and abdominal cramping with intercourse. Wears pull-ups while in public. Does not use public restrooms. Nocturia 2 times per night. Voiding interval is variable. Has trouble initiating her urine stream     OB/GYN History:  Caesarean  Sexually active? Yes  Pain with vaginal exams, intercourse or tampon use? Yes, pain in the abdomen. Burning pain in the vagina.    Bladder/Bowel History:    Frequency of urination:   Daytime: every couple of hours           Nighttime: 2 times every night   Difficulty initiating urine stream: Yes   Urine stream: weak   Complete emptying: No   Bladder leakage: Yes   Frequency of incidents: regularly   Amount leaked (urine): small squirt  and large squirt   Urinary Urgency: Yes  Able to delay the urge for at least 2-3 minute(s).   Frequency of bowel  movements: once every 2-3 days   Difficulty initiating BM: Yes   Quality/Shape of BM: hard   Does Patient Feel Empty after BM? Yes   Fiber Supplements or Laxative Use?  Yes   Colon leakage: Yes   Frequency of incidents: rarely    Amount leaked (bowels): small amount   Form of protection: pad or depend when going out in public   Number of pads required in 24 hours:     Prior Therapy/Previous treatment included: none  Social History: lives with , son, MIL, CONRADO  Current exercise: none  Occupation: [employer]  Prior Level of Function: no urinary or bowel issues  Current Level of Function: pelvic pain, dyspareunia, urinary and bowel leakage/urgency    Types of fluid intake: water 3 20oz bottles. juice  Diet: Traditional   Habitus:thin  Abuse/Neglect: No     PAIN:  Location: low abdomen   Current 0/10, worst 7/10, best 0/10   Description: cramping  Aggravating Factors/Activities that cause symptoms: Bowel movement    Easing Factors: emptying bladder bowel     Medical History: Medina  has a past medical history of Cerebral palsy, Gait abnormality, and Low blood pressure.     Surgical History:  Medina Hernandez  has a past surgical history that includes  section and Laparoscopic salpingectomy (Bilateral, 7/10/2020).    Medications: Medina has a current medication list which includes the following prescription(s): acetaminophen, afluria qd -(3yr up)(pf), baclofen, calcium carbonate, clobetasol 0.05%, estradiol, gabapentin, ibuprofen, lidocaine hcl 2%, solifenacin, tramadol, valacyclovir, and gemtesa.    Allergies: Review of patient's allergies indicates:  No Known Allergies     Imaging See EMR for full imaging workup    Pts goals: improve urinary and bowel function    OBJECTIVE     See EMR under MEDIA for written consent provided 2022  Chaperone: declined    ORTHO SCREEN  Posture in sitting: posterior pelvic tilt in wheelchair  Posture in standing: not tested    Further objective testing not  performed. Pt requested to terminate visit early due to soiled Depends and stated she would call to schedule follow up. I personally wheeled patient back to the waiting room to meet her  and provided them with a business card for the clinic.    TREATMENT     Treatment Time In: 11:05a  Treatment Time Out: 11:30a  Total Treatment time (time-based codes) separate from Evaluation: 25 minutes    Therapeutic Activity Patient participated in dynamic functional therapeutic activities to improve functional performance for 25 minutes. Including: Education as described below.     Patient Education provided:   general anatomy/physiology of urinary/ bowel  system, benefits of treatment, risks of treatment and alternative methods of treatment were discussed with the pt. Additionally, bladder retraining, general bladder health, vulvar hygiene, toileting mechanics, and 360 deg breathing were reviewed.     Home Exercises Provided:  Written Home Exercises Provided: yes.  Exercises were reviewed and Medina was able to demonstrate them prior to the end of the session.    Medina demonstrated good  understanding of the education provided.     See EMR under Patient Instructions for exercises provided 1/12/2022.    Assessment     Medina is a 36 y.o. female referred to outpatient Physical Therapy with a medical diagnosis of Urinary urgency [R39.15], Cerebral palsy, unspecified type [G80.9]. Pt presents with altered posture, poor trunk stability, decreased pelvic muscle strength, increased frequency of urination, increased nocturia, poor coordination of pelvic floor muscles during ADL's leading to urinary or fecal leakage, dysfunctional voiding and dysfunctional defecation. I was unable to perform objective assessment today due to pt requesting to leave prior to exam. We discussed the need for an exam in the future, she verbalized understanding. Extensive pelvic floor and bowel/bladder education provided today. Pt demonstrated  understanding.     Pt prognosis is Fair.   Pt will benefit from skilled outpatient Physical Therapy to address the deficits stated above and in the chart below, provide pt/family education, and to maximize pt's level of independence.     Plan of care discussed with patient: Yes  Pt's spiritual, cultural and educational needs considered and patient is agreeable to the plan of care and goals as stated below:       Anticipated Barriers for therapy: none    Medical Necessity is demonstrated by the following    History  Co-morbidities and personal factors that may impact the plan of care Co-morbidities:   level of undertstanding of current condition, prior pelvic surgery, transportation assistance required and Cerebral palsy    Personal Factors:   no deficits     high   Examination  Body Structures and Functions, activity limitations and participation restrictions that may impact the plan of care Body Regions/Systems/Functions:  altered posture, poor trunk stability, decreased pelvic muscle strength, increased frequency of urination, increased nocturia, poor coordination of pelvic floor muscles during ADL's leading to urinary or fecal leakage, dysfunctional voiding and dysfunctional defecation     Activity Limitations:  urgency , bearing down for BM, initiating a BM, Initiating urine stream, full bladder emptying, intercourse/vaginal exam/tampon use without pain, sleep uninterrupted by excessive nocturia and incontinence with ADLs    Participation Restrictions:  all ADLs/iADLs interrupted by urinary incontinence/urgency/frequency and all ADLs/iADLs interrupted by fecal incontinence/urgency/frequency    Activity limitations:   Learning and applying knowledge  no deficits    General Tasks and Commands  no deficits    Communication  no deficits    Mobility  Wheelchair    Self care  no deficits    Domestic Life  no deficits    Interactions/Relationships  no deficits    Life Areas  no deficits    Community and Social Life  no  deficits       high   Clinical Presentation unstable clinical presentation with unpredictable characteristics high   Decision Making/ Complexity Score: high     Short Term Goals: 4 weeks  Pt to be edu pelvic muscle bracing and be able to consistently perform correctly and quickly to help decrease incontinence with cough/laugh/sneeze.  Pt to demonstrate being able to correctly and consistently perform a kegel which is needed  to increase pelvic floor muscle coordination and strength needed for continence.  Pt to be able to delay the urge to urinate at least 2 minutes with a strong urge to urinate in order to make it to the bathroom without leaking.  Pt to voice understanding of the role that diet plays on urinary urgency.      Long Term Goals: 12 weeks  Pt to be discharged with home plan for carry over after discharge.    Pt to be able to perform a 3 second kegel x 10 reps to demonstrate improving strength and endurance needed for continence.  Pt to report a decrease in pad usage to 2 pads a day to demonstrate improving pelvic floor muscle controls as evidenced by decreased episodes of incontinence needed to improve confidence in social situations.  Pt to be able to delay the urge to urinate at least 5 minutes with a strong urge to urinate in order to make it to the bathroom without leaking.  oving pelvic support of pelvic structures.  Pt to increase pelvic floor strength to at least 2/5 to demonstrate improved strength needed for continence with ADLs.     Plan     Plan of care Certification: 1/12/2022 to 5/3/22.    Outpatient Physical Therapy 1 times weekly for 12 weeks to include the following interventions: therapeutic exercises, therapeutic activity, neuromuscular re-education, manual therapy, modalities PRN, patient/family education, dry needling, and self care/home management    I appreciate your consult and look forward to participating in this patient's care.    January Jurado, PT, DPT

## 2022-01-14 ENCOUNTER — HOSPITAL ENCOUNTER (EMERGENCY)
Facility: HOSPITAL | Age: 37
Discharge: HOME | End: 2022-01-14
Attending: EMERGENCY MEDICINE
Payer: MEDICAID

## 2022-01-14 VITALS
HEIGHT: 63 IN | WEIGHT: 110 LBS | HEART RATE: 88 BPM | DIASTOLIC BLOOD PRESSURE: 53 MMHG | TEMPERATURE: 98 F | BODY MASS INDEX: 19.49 KG/M2 | SYSTOLIC BLOOD PRESSURE: 98 MMHG | OXYGEN SATURATION: 100 % | RESPIRATION RATE: 16 BRPM

## 2022-01-14 DIAGNOSIS — S01.81XA FACIAL LACERATION, INITIAL ENCOUNTER: Primary | ICD-10-CM

## 2022-01-14 PROCEDURE — 99282 EMERGENCY DEPT VISIT SF MDM: CPT | Mod: 25

## 2022-01-14 PROCEDURE — 12002 RPR S/N/AX/GEN/TRNK2.6-7.5CM: CPT

## 2022-01-14 PROCEDURE — 12013 RPR F/E/E/N/L/M 2.6-5.0 CM: CPT

## 2022-01-14 PROCEDURE — 25000003 PHARM REV CODE 250: Performed by: EMERGENCY MEDICINE

## 2022-01-14 RX ORDER — LIDOCAINE HYDROCHLORIDE 10 MG/ML
10 INJECTION INFILTRATION; PERINEURAL
Status: COMPLETED | OUTPATIENT
Start: 2022-01-14 | End: 2022-01-14

## 2022-01-14 RX ADMIN — LIDOCAINE HYDROCHLORIDE 10 ML: 10 INJECTION, SOLUTION INFILTRATION; PERINEURAL at 06:01

## 2022-01-15 NOTE — ED TRIAGE NOTES
Pt admitted to the ED for a fall. Pt reports while walking she tripped and fell forward. Pt reports hitting the left side of head when falling. Pt denies LOC or use of blood thinners. Pt denies blurry vision.

## 2022-01-15 NOTE — ED PROVIDER NOTES
Encounter Date: 2022       History     Chief Complaint   Patient presents with    Fall     C/o slip and fall while making  her bed. Laceration above left ear. Hx of cerebral palsy. Bleeding controlled. Denies dizziness or LOCAAO x 4     Patient is a 37 yo F with pmhx of cerebral palsy who presents to the ED with the complaint of laceration. Pt sustained a mechanical fall while making her bed. She states that she fell at approximately 5pm. She states that she hit the L side of her head but is unsure as to what she hit. She denies any LOC or headache. Furthermore, she denies any neck or back pain. Patient states she is UTD on her tetanus.         Review of patient's allergies indicates:  No Known Allergies  Past Medical History:   Diagnosis Date    Cerebral palsy     Gait abnormality     Low blood pressure      Past Surgical History:   Procedure Laterality Date     SECTION      LAPAROSCOPIC SALPINGECTOMY Bilateral 7/10/2020    Procedure: SALPINGECTOMY, LAPAROSCOPIC;  Surgeon: Rosa Mcclendon MD;  Location: [facility] OR;  Service: OB/GYN;  Laterality: Bilateral;  video confirmed  CW     No family history on file.  Social History     Tobacco Use    Smoking status: Never Smoker    Smokeless tobacco: Never Used   Substance Use Topics    Alcohol use: No    Drug use: No     Review of Systems   Constitutional: Negative for chills and fever.   Respiratory: Negative for chest tightness and shortness of breath.    Cardiovascular: Negative for chest pain, palpitations and leg swelling.   Gastrointestinal: Negative for diarrhea, nausea and vomiting.   Musculoskeletal: Negative for back pain.   Skin: Positive for wound.   Neurological: Negative for dizziness, weakness, light-headedness, numbness and headaches.   Psychiatric/Behavioral: Negative for agitation and confusion.       Physical Exam     Initial Vitals [22 1758]   BP Pulse Resp Temp SpO2   107/67 98 20 98.3 °F (36.8 °C) 100 %      MAP       --          Physical Exam    Nursing note and vitals reviewed.  Constitutional: She appears well-developed and well-nourished. She is not diaphoretic. No distress.   HENT:   Head: Normocephalic.       Right Ear: External ear normal.   Left Ear: External ear normal.   Curvilinear laceration superior to the L ear approximately 4cm in length; no active bleeding; no other findings of skull depression or additional laceration   Eyes: Conjunctivae and EOM are normal. Pupils are equal, round, and reactive to light.   Neck: Neck supple.   No midline tenderness  Normal ROM    Normal range of motion.  Cardiovascular: Normal rate, regular rhythm, normal heart sounds and intact distal pulses.   Pulmonary/Chest: Breath sounds normal.   Abdominal: Abdomen is soft. Bowel sounds are normal.   Musculoskeletal:         General: Normal range of motion.      Cervical back: Normal range of motion and neck supple.     Neurological: She is alert and oriented to person, place, and time. She has normal strength. GCS score is 15. GCS eye subscore is 4. GCS verbal subscore is 5. GCS motor subscore is 6.   No focal neurological deficits  Strength 5/5   Sensation grossly in tact   MAEW  GCS 15    Skin: Skin is warm and dry. Capillary refill takes less than 2 seconds.   Psychiatric: She has a normal mood and affect. Thought content normal.         ED Course   Lac Repair    Date/Time: 1/14/2022 9:43 PM  Performed by: Esvin Patel MD  Authorized by: Esvin Patel MD     Consent:     Consent obtained:  Verbal    Consent given by:  Patient    Risks, benefits, and alternatives were discussed: yes      Risks discussed:  Infection and need for additional repair  Treatment:     Debridement:  None    Undermining:  None  Skin repair:     Repair method:  Sutures    Suture size:  5-0    Suture material:  Fast-absorbing gut    Suture technique:  Simple interrupted    Number of sutures:  3  Approximation:     Approximation:  Close  Repair type:      Repair type:  Simple  Post-procedure details:     Dressing:  Open (no dressing)  Comments:      Approximately 3 absorbable sutures placed; non adherent dressing placed; patient tolerated procedure without difficulty      Labs Reviewed - No data to display       Imaging Results    None          Medications   LIDOcaine HCL 10 mg/ml (1%) injection 10 mL (10 mLs Infiltration Given 1/14/22 1827)     Medical Decision Making:   ED Management:  - absorbable sutures placed; patient tolerated procedure without difficulty (see procedure note for details)   - pt stable for discharge  - No further intervention is indicated at this time after having taken into account the patient's history, physical exam findings, and empirical and objective data obtained during the patient's emergency department workup.   - The patient is at low risk for an emergent medical condition at this time, and I am of the belief that that it is safe to discharge the patient from the emergency department.   - The patient is instructed to follow up as outpatient as indicated on the discharge paperwork.    - I have discussed the specifics of the workup with the patient and the patient has verbalized understanding of the details of the workup, the diagnosis, the treatment plan, and the need for outpatient follow-up.    - Although the patient has no emergent etiology today this does not preclude the development of an emergent condition so, in addition, I have advised the patient that they can return to the ED and/or activate EMS at any time with worsening of their symptoms, change of their symptoms, or with any other medical complaint.    - The patient remained comfortable and stable during their visit in the ED.    - Discharge and follow-up instructions discussed with the patient who expressed understanding and willingness to comply with my recommendations.  - Results of all emergency department tests  discussed thoroughly with patient; all patient questions  answered; pt in agreement with plan  - Pt instructed to follow up with PCP in 2-3 days for recheck of today's complaints  - Pt given strict emergency department return precautions for any new or worsening of symptoms  - Pt discharged from the emergency department in stable condition, in no acute distress                        Clinical Impression:   Final diagnoses:  [S01.81XA] Facial laceration, initial encounter (Primary)          ED Disposition Condition    Discharge Stable        ED Prescriptions     None        Follow-up Information    None          Esvin Patel MD  01/14/22 3558

## 2022-02-09 ENCOUNTER — OFFICE VISIT (OUTPATIENT)
Dept: UROGYNECOLOGY | Facility: CLINIC | Age: 37
End: 2022-02-09
Payer: MEDICAID

## 2022-02-09 VITALS
HEIGHT: 63 IN | HEART RATE: 82 BPM | DIASTOLIC BLOOD PRESSURE: 67 MMHG | SYSTOLIC BLOOD PRESSURE: 103 MMHG | BODY MASS INDEX: 19.49 KG/M2

## 2022-02-09 DIAGNOSIS — N39.0 FREQUENT UTI: ICD-10-CM

## 2022-02-09 DIAGNOSIS — N81.89 PELVIC FLOOR WEAKNESS IN FEMALE: ICD-10-CM

## 2022-02-09 DIAGNOSIS — K59.00 CONSTIPATION, UNSPECIFIED CONSTIPATION TYPE: ICD-10-CM

## 2022-02-09 DIAGNOSIS — R39.15 URINARY URGENCY: Primary | ICD-10-CM

## 2022-02-09 PROCEDURE — 1159F MED LIST DOCD IN RCRD: CPT | Mod: CPTII,,, | Performed by: NURSE PRACTITIONER

## 2022-02-09 PROCEDURE — 87086 URINE CULTURE/COLONY COUNT: CPT | Performed by: NURSE PRACTITIONER

## 2022-02-09 PROCEDURE — 99999 PR PBB SHADOW E&M-EST. PATIENT-LVL IV: CPT | Mod: PBBFAC,,, | Performed by: NURSE PRACTITIONER

## 2022-02-09 PROCEDURE — 87088 URINE BACTERIA CULTURE: CPT | Performed by: NURSE PRACTITIONER

## 2022-02-09 PROCEDURE — 3008F BODY MASS INDEX DOCD: CPT | Mod: CPTII,,, | Performed by: NURSE PRACTITIONER

## 2022-02-09 PROCEDURE — 99214 OFFICE O/P EST MOD 30 MIN: CPT | Mod: PBBFAC | Performed by: NURSE PRACTITIONER

## 2022-02-09 PROCEDURE — 3074F PR MOST RECENT SYSTOLIC BLOOD PRESSURE < 130 MM HG: ICD-10-PCS | Mod: CPTII,,, | Performed by: NURSE PRACTITIONER

## 2022-02-09 PROCEDURE — 3078F PR MOST RECENT DIASTOLIC BLOOD PRESSURE < 80 MM HG: ICD-10-PCS | Mod: CPTII,,, | Performed by: NURSE PRACTITIONER

## 2022-02-09 PROCEDURE — 87077 CULTURE AEROBIC IDENTIFY: CPT | Performed by: NURSE PRACTITIONER

## 2022-02-09 PROCEDURE — 1160F RVW MEDS BY RX/DR IN RCRD: CPT | Mod: CPTII,,, | Performed by: NURSE PRACTITIONER

## 2022-02-09 PROCEDURE — 87186 SC STD MICRODIL/AGAR DIL: CPT | Performed by: NURSE PRACTITIONER

## 2022-02-09 PROCEDURE — 99999 PR PBB SHADOW E&M-EST. PATIENT-LVL IV: ICD-10-PCS | Mod: PBBFAC,,, | Performed by: NURSE PRACTITIONER

## 2022-02-09 PROCEDURE — 99214 OFFICE O/P EST MOD 30 MIN: CPT | Mod: 25,S$PBB,, | Performed by: NURSE PRACTITIONER

## 2022-02-09 PROCEDURE — 3078F DIAST BP <80 MM HG: CPT | Mod: CPTII,,, | Performed by: NURSE PRACTITIONER

## 2022-02-09 PROCEDURE — 1160F PR REVIEW ALL MEDS BY PRESCRIBER/CLIN PHARMACIST DOCUMENTED: ICD-10-PCS | Mod: CPTII,,, | Performed by: NURSE PRACTITIONER

## 2022-02-09 PROCEDURE — 99214 PR OFFICE/OUTPT VISIT, EST, LEVL IV, 30-39 MIN: ICD-10-PCS | Mod: 25,S$PBB,, | Performed by: NURSE PRACTITIONER

## 2022-02-09 PROCEDURE — 3008F PR BODY MASS INDEX (BMI) DOCUMENTED: ICD-10-PCS | Mod: CPTII,,, | Performed by: NURSE PRACTITIONER

## 2022-02-09 PROCEDURE — 3074F SYST BP LT 130 MM HG: CPT | Mod: CPTII,,, | Performed by: NURSE PRACTITIONER

## 2022-02-09 PROCEDURE — 1159F PR MEDICATION LIST DOCUMENTED IN MEDICAL RECORD: ICD-10-PCS | Mod: CPTII,,, | Performed by: NURSE PRACTITIONER

## 2022-02-09 NOTE — PATIENT INSTRUCTIONS
1. Mixed urinary incontinence, urge > stress with post-void urgency:  --urine C&S today  --PVR normal today  --Empty bladder every 3 hours.  Empty well: wait a minute, lean forward on toilet.    --Avoid dietary irritants (see sheet).  Keep diary x 3-5 days to determine your irritants.  --KEGELS: do 10 in AM and 10 in PM, holding each x 10 seconds.  When you feel urge to go, STOP, KEGEL, and when urge has passed, then go to bathroom.    --PT will help   --URGE: continue vesicare 5 mg daily. Will try to add gemtessa 75 mg daily  For dry mouth: get sour, sugar free lozenge or gum.    --STRESS:  Pessary vs. Sling.      2. Constipation:  --continue fiber supplement     3. Pelvic floor weakness  --restart pelvic floor PT   --I will send a messary.     4. Vulvar atrophy-- concerns for lichen  --twice weekly AM apply steroid ointment/cream:  Apply dime-sized amount with finger to vaginal opening, inner lips, and all external areas (including around anus) that are irritated.  DO NOT APPLY INTERNALLY.   --twice weekly PM apply estrogen cream.  Apply dime-sized amount with finger to vaginal opening, inner lips, and all external areas (including around anus) that are irritated. Also apply small amount inside vagina with finger (insert to knuckle).     5. Frequent UTIs (bladder infections):  --urine C&S  --8/2021 urine C&S (clean cath) + staph aureus (may have been skin contaminant)  --If you feel like you have a UTI, please call our office so that we can place an order for you to drop off a urine specimen at the closest [facility] lab (we will arrange).                --We will call in antibiotics for you to start right after you drop off specimen.                --In this way, we can determine:                           1)  Do you have a UTI?                            2) If you have a UTI, is it sensitive to the antibiotics we prescribed?   --follow UTI prevention tips (see attached)  --control bowel movements/fecal  cross-contamination  --change your liner pad every time you pull your pants on  --empty bladder before and after intercourse  --continue taking 1 probiotic pill (any with lactobacillus and/or acidophilus) daily  --retroperitoneal ultrasound scheduled--follow up  --cystoscopy normal today but with moderate trabeculations               ---with PV urgency, concern for OAB              6. Microscopic hematuria  --retroperitoneal ultrasound normal  --cystoscopy normal today but with moderate trabeculations               ---with PV urgency, concern for OAB      7. RTC 4 months for follow up

## 2022-02-09 NOTE — PROGRESS NOTES
Urogyn follow up  2022  .  [facility] - UROGYNECOLOGY  [address] [address]    Medina Hernandez  [id number]  1985      Medina Hernandez is a 36 y.o.  here for a urogyn follow up for urge incontinence.    Last HPI from 2021  1)  Mixed urinary incontinence, urge > stress:    --+ urinary urgency  --not leaking urine     2. Constipation:  --taking fiber supplement.     3. Pelvic floor weakness  --has not started pelvic floor PT        4. Vulvar atrophy-- concerns for lichen  --using clobetasol and estrogen cream     5. Frequent UTIs (bladder infections):  --no current symptoms     6.microscopic hematuria  --6 rbc in cath specimen    Changes from last visit:  1)  Mixed urinary incontinence, urge > stress:    --+ urinary urgency/ UUI  --taking vesicare 5 mg daily--feels like she can not initiate void sometime  --voiding every hour during the day  --nocturia 2/ night--does not limit fluids  --1-2 pullups/ day  --minimally wet for the most part--occasionally soaked      2. Constipation:  --improved  --taking fiber supplement.     3. Pelvic floor weakness  --had eval  --needs to schedule follow up visit        4. Vulvar atrophy-- concerns for lichen  --using clobetasol and estrogen cream--has not been using      5. Frequent UTIs (bladder infections):  --no current symptoms     6.microscopic hematuria  --normal cysto    Past Medical History:   Diagnosis Date    Cerebral palsy     Gait abnormality     Low blood pressure        Past Surgical History:   Procedure Laterality Date     SECTION      LAPAROSCOPIC SALPINGECTOMY Bilateral 7/10/2020    Procedure: SALPINGECTOMY, LAPAROSCOPIC;  Surgeon: Rosa Mcclendon MD;  Location: [facility];  Service: OB/GYN;  Laterality: Bilateral;  video confirmed  CW       History reviewed. No pertinent family history.    Social History     Socioeconomic History    Marital status:     Number of children: 1   Tobacco Use    Smoking status:  Never Smoker    Smokeless tobacco: Never Used   Substance and Sexual Activity    Alcohol use: No    Drug use: No    Sexual activity: Yes     Partners: Male     Birth control/protection: Condom, Implant       Current Outpatient Medications   Medication Sig Dispense Refill    acetaminophen (TYLENOL) 500 MG tablet Take 1-2 tablets (500-1,000 mg total) by mouth 3 (three) times daily as needed for Pain.  0    AFLURIA QD 2020-21,3YR UP,,PF, 60 mcg (15 mcg x 4)/0.5 mL Syrg ADM 0.5ML IM UTD      baclofen (LIORESAL) 20 MG tablet Take 1 tablet (20 mg total) by mouth 3 (three) times daily. 90 tablet 2    calcium carbonate (CALTRATE 600 ORAL) Take by mouth once daily.      clobetasol 0.05% (TEMOVATE) 0.05 % Oint Apply topically once daily. 30 g 3    estradioL (ESTRACE) 0.01 % (0.1 mg/gram) vaginal cream Place 1 g vaginally once daily. 42.5 g 3    gabapentin (NEURONTIN) 300 MG capsule Take 1 capsule (300 mg total) by mouth As instructed (Take one capsule every morning and after noon, and two at bedtime (4 capsules total daily)). 120 capsule 3    ibuprofen (ADVIL,MOTRIN) 600 MG tablet Take 1 tablet (600 mg total) by mouth every 6 (six) hours as needed for Pain (pain). 30 tablet 1    lidocaine HCL 2% (XYLOCAINE) 2 % jelly Apply topically 3 (three) times daily. Apply to affected area three times daily prn 30 mL 1    traMADoL (ULTRAM) 50 mg tablet Take 1 tablet (50 mg total) by mouth 3 (three) times daily as needed for Pain. 90 tablet 0    solifenacin (VESICARE) 5 MG tablet Take 1 tablet (5 mg total) by mouth once daily. (Patient not taking: Reported on 2/9/2022) 30 tablet 11    valACYclovir (VALTREX) 500 MG tablet Take two tablets twice a day for 5 days. For recurrent episodes: 1 tablet twice daily for 3 days. For Suppression:take 1 tablet once daily (Patient not taking: No sig reported) 60 tablet 2    vibegron (GEMTESA) 75 mg Tab Take 75 mg by mouth once daily. (Patient not taking: Reported on 2/9/2022) 30 tablet  "11     No current facility-administered medications for this visit.       Review of patient's allergies indicates:  No Known Allergies    Well woman:  Pap test: 09/2021 normal HPV negative History of abnormal paps: No.  History of STIs:  No  Mammogram: n/a  Colonoscopy n/a  DEXA:  n/a    ROS:  As per HPI.      Exam  /67   Pulse 82   Ht 5' 3" (1.6 m)   BMI 19.49 kg/m²   General: alert and oriented, no acute distress  Respiratory: normal respiratory effort  Abd: soft, non-tender, non-distended    Pelvic  Ext. Genitalia: normal external genitalia. Normal bartholin's and skeens glands  Vagina: + atrophy. Normal vaginal mucosa without lesions. No discharge noted.   Non-tender bladder base without palpable mass.  Cervix: no lesions  Uterus:  uterus is normal size, shape, consistency and nontender   Urethra: no masses or tenderness  Urethral meatus: no lesions, caruncle or prolapse.    Impression  No diagnosis found.  We reviewed the above issues and discussed options for short-term versus long-term management of her problems.   Plan:      1. Mixed urinary incontinence, urge > stress with post-void urgency:  --urine C&S today  --PVR normal today  --Empty bladder every 3 hours.  Empty well: wait a minute, lean forward on toilet.    --Avoid dietary irritants (see sheet).  Keep diary x 3-5 days to determine your irritants.  --KEGELS: do 10 in AM and 10 in PM, holding each x 10 seconds.  When you feel urge to go, STOP, KEGEL, and when urge has passed, then go to bathroom.    --PT will help   --URGE: continue vesicare 5 mg daily. Will try to add gemtessa 75 mg daily  For dry mouth: get sour, sugar free lozenge or gum.    --STRESS:  Pessary vs. Sling.      2. Constipation:  --continue fiber supplement     3. Pelvic floor weakness  --restart pelvic floor PT   --I will send a messary.     4. Vulvar atrophy-- concerns for lichen  --twice weekly AM apply steroid ointment/cream:  Apply dime-sized amount with finger to " vaginal opening, inner lips, and all external areas (including around anus) that are irritated.  DO NOT APPLY INTERNALLY.   --twice weekly PM apply estrogen cream.  Apply dime-sized amount with finger to vaginal opening, inner lips, and all external areas (including around anus) that are irritated. Also apply small amount inside vagina with finger (insert to knuckle).     5. Frequent UTIs (bladder infections):  --urine C&S  --8/2021 urine C&S (clean cath) + staph aureus (may have been skin contaminant)  --If you feel like you have a UTI, please call our office so that we can place an order for you to drop off a urine specimen at the closest [facility] lab (we will arrange).                --We will call in antibiotics for you to start right after you drop off specimen.                --In this way, we can determine:                           1)  Do you have a UTI?                            2) If you have a UTI, is it sensitive to the antibiotics we prescribed?   --follow UTI prevention tips (see attached)  --control bowel movements/fecal cross-contamination  --change your liner pad every time you pull your pants on  --empty bladder before and after intercourse  --continue taking 1 probiotic pill (any with lactobacillus and/or acidophilus) daily  --retroperitoneal ultrasound scheduled--follow up  --cystoscopy normal today but with moderate trabeculations               ---with PV urgency, concern for OAB              6. Microscopic hematuria  --retroperitoneal ultrasound normal  --cystoscopy normal today but with moderate trabeculations               ---with PV urgency, concern for OAB      7. RTC 4 months for follow up      I spent a total of 30 minutes on the day of the visit.  This includes face to face time and non-face to face time preparing to see the patient (eg, review of tests), obtaining and/or reviewing separately obtained history, documenting clinical information in the electronic or other health record,  independently interpreting results and communicating results to the patient/family/caregiver, or care coordinator.    Анна Moreno, WILDERP-BC [facility]  Division of Female Pelvic Medicine and Reconstructive Surgery  Department of Obstetrics & Gynecology

## 2022-02-11 ENCOUNTER — TELEPHONE (OUTPATIENT)
Dept: UROGYNECOLOGY | Facility: CLINIC | Age: 37
End: 2022-02-11
Payer: MEDICAID

## 2022-02-11 ENCOUNTER — PATIENT MESSAGE (OUTPATIENT)
Dept: UROGYNECOLOGY | Facility: CLINIC | Age: 37
End: 2022-02-11
Payer: MEDICAID

## 2022-02-11 LAB — BACTERIA UR CULT: ABNORMAL

## 2022-02-11 RX ORDER — CEPHALEXIN 500 MG/1
500 CAPSULE ORAL EVERY 12 HOURS
Qty: 14 CAPSULE | Refills: 0 | Status: SHIPPED | OUTPATIENT
Start: 2022-02-11 | End: 2022-02-18

## 2022-02-18 ENCOUNTER — TELEPHONE (OUTPATIENT)
Dept: UROGYNECOLOGY | Facility: CLINIC | Age: 37
End: 2022-02-18
Payer: MEDICAID

## 2022-02-18 NOTE — TELEPHONE ENCOUNTER
PA was resubmitted through [employer], key:[id number] for Rx [id number] Gemtesa 75 mg waiting approval.

## 2022-02-22 ENCOUNTER — PATIENT MESSAGE (OUTPATIENT)
Dept: UROGYNECOLOGY | Facility: CLINIC | Age: 37
End: 2022-02-22
Payer: MEDICAID

## 2022-03-14 ENCOUNTER — TELEPHONE (OUTPATIENT)
Dept: UROGYNECOLOGY | Facility: CLINIC | Age: 37
End: 2022-03-14
Payer: MEDICAID

## 2022-03-14 DIAGNOSIS — R39.15 URINARY URGENCY: Primary | ICD-10-CM

## 2022-03-14 RX ORDER — SOLIFENACIN SUCCINATE 5 MG/1
5 TABLET, FILM COATED ORAL DAILY
Qty: 30 TABLET | Refills: 11 | Status: SHIPPED | OUTPATIENT
Start: 2022-03-14 | End: 2023-04-06

## 2022-03-14 NOTE — TELEPHONE ENCOUNTER
Returned pt call no answer, Left voice message for pt to give the office a call back at [phone].    Pt is requesting a rx for Vesicare to be sent to [facility]. She was last seen by RUBIO Moreno 2/9/22.

## 2022-03-14 NOTE — TELEPHONE ENCOUNTER
----- Message from Nikkie Bliss sent at 3/14/2022  1:03 PM CDT -----  Contact: KI BAIG [[id number]]  Type:  RX Refill Request    Who Called: KI BAIG [[id number]]    Refill or New Rx: New    RX Name and Strength: solifenacin (VESICARE) 5 MG tablet     How is the patient currently taking it? (ex. 1XDay): Sig - Route: Take 1 tablet (5 mg total) by mouth once daily. - Oral    Is this a 30 day or 90 day RX:    Preferred Pharmacy with phone number: [id number] - [place], [place] - [address] [place] [facility] [address]    Local or Mail Order: Local     Ordering Provider:    Would the patient rather a call back or a response via [facility]? Call     Best Call Back Number: [phone] (mobile)    Additional Information:

## 2022-04-13 ENCOUNTER — HOSPITAL ENCOUNTER (EMERGENCY)
Facility: HOSPITAL | Age: 37
Discharge: HOME | End: 2022-04-13
Attending: EMERGENCY MEDICINE
Payer: MEDICAID

## 2022-04-13 VITALS
DIASTOLIC BLOOD PRESSURE: 61 MMHG | SYSTOLIC BLOOD PRESSURE: 103 MMHG | OXYGEN SATURATION: 89 % | TEMPERATURE: 99 F | BODY MASS INDEX: 20.38 KG/M2 | HEART RATE: 78 BPM | WEIGHT: 115 LBS | HEIGHT: 63 IN | RESPIRATION RATE: 16 BRPM

## 2022-04-13 DIAGNOSIS — S01.01XA LACERATION OF SCALP, INITIAL ENCOUNTER: ICD-10-CM
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
## 2022-04-13 DIAGNOSIS — S09.90XA INJURY OF HEAD, INITIAL ENCOUNTER: ICD-10-CM

## 2022-04-13 PROCEDURE — 25000003 PHARM REV CODE 250: Performed by: EMERGENCY MEDICINE

## 2022-04-13 PROCEDURE — 99283 EMERGENCY DEPT VISIT LOW MDM: CPT | Mod: 25

## 2022-04-13 PROCEDURE — 12001 RPR S/N/AX/GEN/TRNK 2.5CM/<: CPT

## 2022-04-13 RX ORDER — LIDOCAINE HYDROCHLORIDE 10 MG/ML
5 INJECTION, SOLUTION EPIDURAL; INFILTRATION; INTRACAUDAL; PERINEURAL
Status: COMPLETED | OUTPATIENT
Start: 2022-04-13 | End: 2022-04-13

## 2022-04-13 RX ORDER — BACITRACIN 500 [USP'U]/G
OINTMENT TOPICAL
Status: DISCONTINUED | OUTPATIENT
Start: 2022-04-13 | End: 2022-04-13 | Stop reason: HOSPADM

## 2022-04-13 RX ADMIN — LIDOCAINE HYDROCHLORIDE 50 MG: 10 INJECTION, SOLUTION EPIDURAL; INFILTRATION; INTRACAUDAL; PERINEURAL at 05:04

## 2022-04-13 NOTE — ED PROVIDER NOTES
Encounter Date: 2022       History     Chief Complaint   Patient presents with    Fall     EMS reports pt trip and fall and hit her head laceration to back of head and bridge of nose. Denies LOC. Bleeding controlled. Denies us of blood thinnerHx of Cerebral Palsy. AAO x 4     HPI   This is a 36 y.o. female who has a past medical history of Cerebral palsy, Gait abnormality, and Low blood pressure.     The patient presents to the Emergency Department with fall.   Patient had mechanical fall and hit the back of her head.  No LOC, no headache, no blood thinner use.  Patient sustained a small laceration and contusion posterior scalp.  Pt denies focal numbness or weakness, difficulty speaking.  No other associated symptoms   Pain is aggravated by:  Palpation.  Symptoms are relieved by:  Nothing.   States Tdap is up-to-date      Review of patient's allergies indicates:  No Known Allergies  Past Medical History:   Diagnosis Date    Cerebral palsy     Gait abnormality     Low blood pressure      Past Surgical History:   Procedure Laterality Date     SECTION      LAPAROSCOPIC SALPINGECTOMY Bilateral 7/10/2020    Procedure: SALPINGECTOMY, LAPAROSCOPIC;  Surgeon: Rosa Mcclendon MD;  Location: [facility];  Service: OB/GYN;  Laterality: Bilateral;  video confirmed  CW     No family history on file.  Social History     Tobacco Use    Smoking status: Never Smoker    Smokeless tobacco: Never Used   Substance Use Topics    Alcohol use: No    Drug use: No     Review of Systems   All other systems reviewed and are negative.      Physical Exam     Initial Vitals [22 1605]   BP Pulse Resp Temp SpO2   (!) 105/59 89 16 98.7 °F (37.1 °C) 100 %      MAP       --         Physical Exam    Nursing note and vitals reviewed.  Constitutional: She appears well-developed and well-nourished. She is not diaphoretic. No distress.   HENT:   Mouth/Throat: Oropharynx is clear and moist.   Scalp contusion and small laceration  posterior to the apex, 1.5-2cm.    Eyes: Conjunctivae are normal.   Cardiovascular: Normal rate, regular rhythm and intact distal pulses.   Pulmonary/Chest: No respiratory distress.   Musculoskeletal:         General: Normal range of motion.     Neurological: She is alert and oriented to person, place, and time.   Skin: Skin is warm and dry. Capillary refill takes less than 2 seconds. No rash noted. No erythema.   Psychiatric: She has a normal mood and affect.         ED Course   Lac Repair    Date/Time: 4/13/2022 5:45 PM  Performed by: Francesco Godinez MD  Authorized by: Francesco Godinez MD     Consent:     Consent obtained:  Verbal    Consent given by:  Patient    Risks discussed:  Infection and pain  Laceration details:     Location:  Scalp    Length (cm):  2    Depth (mm):  3  Pre-procedure details:     Preparation:  Patient was prepped and draped in usual sterile fashion  Exploration:     Limited defect created (wound extended): no      Imaging outcome: foreign body not noted      Wound exploration: wound explored through full range of motion and entire depth of wound visualized    Treatment:     Amount of cleaning:  Standard  Skin repair:     Repair method:  Staples    Number of staples:  2  Approximation:     Approximation:  Close  Repair type:     Repair type:  Simple  Post-procedure details:     Dressing:  Antibiotic ointment    Procedure completion:  Tolerated well, no immediate complications      Labs Reviewed - No data to display       Imaging Results    None          Medications   LIDOcaine (PF) 10 mg/ml (1%) injection 50 mg (has no administration in time range)   bacitracin ointment (has no administration in time range)     Medical Decision Making:   Initial Assessment:   This is an emergent evaluation of a 36 y.o.female patient with presentation of [facility] fall, head injury, laceration.  No LOC, neuro deficits or headache.     Initial differentials include but are not limited to:  Scalp contusion, scalp  laceration, doubt intracranial hemorrhage or stroke.  Doubt syncope based on history.     Plan:  Lac repair.  Tdap is up-to-date                       Patient advised that despite adequate irrigation and antibiotics possibility of retained body and/or infection exists, and swelling, redness, drainage, fever, or any other concerns should prompt immediate return to ED.      Clinical Impression:   Final diagnoses:  [W19.XXXA] Fall, initial encounter (Primary)  [S09.90XA] Injury of head, initial encounter  [S01.01XA] Laceration of scalp, initial encounter          ED Disposition Condition    Discharge Stable        ED Prescriptions     None        Follow-up Information     Follow up With Specialties Details Why Contact Info    Ivett Cardoso MD Internal Medicine Go in 10 days For staple removal [address] [facility] [address]  [phone]             Francesco Godinez MD  04/13/22 9044

## 2022-04-13 NOTE — DISCHARGE INSTRUCTIONS
Thank you for coming in to see us at [facility]! It was nice to meet you, and I hope you feel better soon. Please feel free to return to the ER at any time should your symptoms get worse, or if you have different emergent concerns.    Our goal in the emergency department is to always give you outstanding care and exceptional service. You may receive a survey by mail or e-mail in the next week regarding your experience in our ED. We would greatly appreciate your completing and returning the survey. Your feedback provides us with a way to recognize our staff who give very good care and it helps us learn how to improve when your experience was below our aspiration of excellence.       Sincerely,    Francesco Godinez MD  Medical Director  Emergency Department  [facility]

## 2022-04-13 NOTE — ED NOTES
Present to ED via EMS after falling from standing position hitting the back of her head. Laceration noted to posterior head, abrasion noted to bridge of nose. Denies dizziness, Denies LOC. Hx of cerebral palsy. No distress

## 2022-04-26 ENCOUNTER — HOSPITAL ENCOUNTER (EMERGENCY)
Facility: HOSPITAL | Age: 37
Discharge: HOME | End: 2022-04-26
Attending: EMERGENCY MEDICINE
Payer: MEDICAID

## 2022-04-26 VITALS
HEART RATE: 87 BPM | OXYGEN SATURATION: 100 % | RESPIRATION RATE: 16 BRPM | TEMPERATURE: 99 F | BODY MASS INDEX: 20.02 KG/M2 | HEIGHT: 63 IN | DIASTOLIC BLOOD PRESSURE: 60 MMHG | WEIGHT: 113 LBS | SYSTOLIC BLOOD PRESSURE: 97 MMHG

## 2022-04-26 DIAGNOSIS — Z48.02 REMOVAL OF STAPLE: ICD-10-CM

## 2022-04-26 DIAGNOSIS — Z51.89 VISIT FOR WOUND CHECK: Primary | ICD-10-CM

## 2022-04-26 PROCEDURE — 99281 EMR DPT VST MAYX REQ PHY/QHP: CPT

## 2022-04-26 NOTE — ED PROVIDER NOTES
Encounter Date: 2022       History     Chief Complaint   Patient presents with    Suture / Staple Removal     Staples removed from head. Staples placed last Wed on the      36-year-old female presents to ED for wound recheck and staple removal.  Patient had staples placed on small laceration to posterior scalp on .  She reports wound site has continued to heal well with no complications.  No drainage or bleeding from site.  No other acute complaints at this time.        Review of patient's allergies indicates:  No Known Allergies  Past Medical History:   Diagnosis Date    Cerebral palsy     Gait abnormality     Low blood pressure      Past Surgical History:   Procedure Laterality Date     SECTION      LAPAROSCOPIC SALPINGECTOMY Bilateral 7/10/2020    Procedure: SALPINGECTOMY, LAPAROSCOPIC;  Surgeon: Rosa Mcclendon MD;  Location: [facility] OR;  Service: OB/GYN;  Laterality: Bilateral;  video confirmed  CW     No family history on file.  Social History     Tobacco Use    Smoking status: Never Smoker    Smokeless tobacco: Never Used   Substance Use Topics    Alcohol use: No    Drug use: No     Review of Systems   Skin: Positive for wound.   Neurological: Negative for headaches.       Physical Exam     Initial Vitals [22 1832]   BP Pulse Resp Temp SpO2   97/60 87 16 98.9 °F (37.2 °C) 100 %      MAP       --         Physical Exam    Nursing note and vitals reviewed.  Constitutional: She appears well-developed and well-nourished. She is active. She does not have a sickly appearance. She does not appear ill. No distress.   HENT:   Head: Normocephalic and atraumatic.   Neck:   Normal range of motion.  Musculoskeletal:      Cervical back: Normal range of motion.     Neurological: She is alert. GCS eye subscore is 4. GCS verbal subscore is 5. GCS motor subscore is 6.   Skin: Skin is warm and dry.   Small laceration to posterior scalp appears to be healing well.  Staples x2 remaining  intact.  No drainage.  Minimal tenderness.   Psychiatric: She has a normal mood and affect. Her speech is normal and behavior is normal.         ED Course   Suture Removal    Date/Time: 4/26/2022 7:13 PM  Location procedure was performed: [facility] EMERGENCY DEPARTMENT  Performed by: Amos Amato PA-C  Authorized by: Francesco Godinez MD   Wound Appearance: clean and well healed  Staples Removed: 2  Facility: sutures placed in this facility  Patient tolerance: Patient tolerated the procedure well with no immediate complications        Labs Reviewed - No data to display       Imaging Results    None          Medications - No data to display  Medical Decision Making:   Initial Assessment:   Patient presents for wound recheck and staple removal after 2 staples were place to posterior scalp on 04/13 after fall.  Patient denying any acute complications.  Vitals grossly WNL.  Patient otherwise very well-appearing with well-healing wound  Differential Diagnosis:   Wound care, staple removal  ED Management:  Staples removed without complications.  Patient tolerated procedure well.  Encouraged to keep area clean and dry, high fall precautions, PCP follow-up.  Patient states her understanding and agrees with plan.                      Clinical Impression:   Final diagnoses:  [Z51.89] Visit for wound check (Primary)  [Z48.02] Removal of staple          ED Disposition Condition    Discharge Stable        ED Prescriptions     None        Follow-up Information    None          Amos Amato PA-C  04/26/22 1914

## 2022-04-26 NOTE — DISCHARGE INSTRUCTIONS

## 2022-04-26 NOTE — ED NOTES
Here for suture removal of scalp laceration. Pt. Sustained a scalp laceration several days ago and wound closure with staples. Here for removal. Pt. Has alteration in gait seconadary to Cerebral Palsy.

## 2022-05-12 DIAGNOSIS — G11.9 ATAXIA DUE TO CEREBELLAR DEGENERATION: ICD-10-CM

## 2022-05-12 RX ORDER — BACLOFEN 20 MG/1
20 TABLET ORAL 3 TIMES DAILY
Qty: 90 TABLET | Refills: 2 | Status: SHIPPED | OUTPATIENT
Start: 2022-05-12 | End: 2023-04-10

## 2022-05-12 NOTE — TELEPHONE ENCOUNTER
----- Message from Luda Merida sent at 5/12/2022 11:17 AM CDT -----  Contact: [phone] Patient  Requesting an RX refill or new RX.  Is this a refill or new RX: new  RX name and strength (copy/paste from chart):  baclofen (LIORESAL) 20 MG tablet  Is this a 30 day or 90 day RX:   Pharmacy name and phone # (copy/paste from chart):    [id number] - [address] AT [facility] [address]  [address] [address]  Phone: [phone] Fax: [phone]

## 2022-06-06 NOTE — TELEPHONE ENCOUNTER
----- Message from Seun Thomson sent at 8/3/2020 11:09 AM CDT -----  Contact: pt @ [phone]  Pt states she's returning the doctor's call     Face Mask

## 2022-07-27 ENCOUNTER — TELEPHONE (OUTPATIENT)
Dept: INTERNAL MEDICINE | Facility: CLINIC | Age: 37
End: 2022-07-27
Payer: MEDICAID

## 2022-07-27 DIAGNOSIS — Z00.00 ANNUAL PHYSICAL EXAM: Primary | ICD-10-CM

## 2022-07-27 NOTE — TELEPHONE ENCOUNTER
----- Message from Saira Solitario sent at 7/27/2022 11:30 AM CDT -----  Contact: Pt [phone]  No blue slot available to schedule an appointment for the patient.  Patient is established with which PCP: Janae  Reason for the visit: Annual   Would the patient like a call back, or a response through their [facility] portal?:  call back

## 2022-08-10 ENCOUNTER — PATIENT OUTREACH (OUTPATIENT)
Dept: ADMINISTRATIVE | Facility: HOSPITAL | Age: 37
End: 2022-08-10
Payer: MEDICAID

## 2022-08-10 ENCOUNTER — PATIENT MESSAGE (OUTPATIENT)
Dept: ADMINISTRATIVE | Facility: HOSPITAL | Age: 37
End: 2022-08-10
Payer: MEDICAID

## 2022-08-10 ENCOUNTER — LAB VISIT (OUTPATIENT)
Dept: LAB | Facility: HOSPITAL | Age: 37
End: 2022-08-10
Attending: HOSPITALIST
Payer: MEDICAID

## 2022-08-10 DIAGNOSIS — Z00.00 ANNUAL PHYSICAL EXAM: ICD-10-CM

## 2022-08-10 LAB
ALBUMIN SERPL BCP-MCNC: 3.9 G/DL (ref 3.5–5.2)
ALP SERPL-CCNC: 53 U/L (ref 55–135)
ALT SERPL W/O P-5'-P-CCNC: 10 U/L (ref 10–44)
ANION GAP SERPL CALC-SCNC: 8 MMOL/L (ref 8–16)
AST SERPL-CCNC: 13 U/L (ref 10–40)
BASOPHILS # BLD AUTO: 0.04 K/UL (ref 0–0.2)
BASOPHILS NFR BLD: 1 % (ref 0–1.9)
BILIRUB SERPL-MCNC: 0.6 MG/DL (ref 0.1–1)
BUN SERPL-MCNC: 12 MG/DL (ref 6–20)
CALCIUM SERPL-MCNC: 9 MG/DL (ref 8.7–10.5)
CHLORIDE SERPL-SCNC: 105 MMOL/L (ref 95–110)
CHOLEST SERPL-MCNC: 129 MG/DL (ref 120–199)
CHOLEST/HDLC SERPL: 2.7 {RATIO} (ref 2–5)
CO2 SERPL-SCNC: 26 MMOL/L (ref 23–29)
CREAT SERPL-MCNC: 0.6 MG/DL (ref 0.5–1.4)
DIFFERENTIAL METHOD: ABNORMAL
EOSINOPHIL # BLD AUTO: 0.1 K/UL (ref 0–0.5)
EOSINOPHIL NFR BLD: 1.2 % (ref 0–8)
ERYTHROCYTE [DISTWIDTH] IN BLOOD BY AUTOMATED COUNT: 11.8 % (ref 11.5–14.5)
EST. GFR  (NO RACE VARIABLE): >60 ML/MIN/1.73 M^2
GLUCOSE SERPL-MCNC: 82 MG/DL (ref 70–110)
HCT VFR BLD AUTO: 35.7 % (ref 37–48.5)
HDLC SERPL-MCNC: 48 MG/DL (ref 40–75)
HDLC SERPL: 37.2 % (ref 20–50)
HGB BLD-MCNC: 11.8 G/DL (ref 12–16)
IMM GRANULOCYTES # BLD AUTO: 0.01 K/UL (ref 0–0.04)
IMM GRANULOCYTES NFR BLD AUTO: 0.2 % (ref 0–0.5)
LDLC SERPL CALC-MCNC: 74.4 MG/DL (ref 63–159)
LYMPHOCYTES # BLD AUTO: 1.1 K/UL (ref 1–4.8)
LYMPHOCYTES NFR BLD: 27.9 % (ref 18–48)
MCH RBC QN AUTO: 30.5 PG (ref 27–31)
MCHC RBC AUTO-ENTMCNC: 33.1 G/DL (ref 32–36)
MCV RBC AUTO: 92 FL (ref 82–98)
MONOCYTES # BLD AUTO: 0.3 K/UL (ref 0.3–1)
MONOCYTES NFR BLD: 8 % (ref 4–15)
NEUTROPHILS # BLD AUTO: 2.5 K/UL (ref 1.8–7.7)
NEUTROPHILS NFR BLD: 61.7 % (ref 38–73)
NONHDLC SERPL-MCNC: 81 MG/DL
NRBC BLD-RTO: 0 /100 WBC
PLATELET # BLD AUTO: 273 K/UL (ref 150–450)
PMV BLD AUTO: 9.8 FL (ref 9.2–12.9)
POTASSIUM SERPL-SCNC: 4 MMOL/L (ref 3.5–5.1)
PROT SERPL-MCNC: 7 G/DL (ref 6–8.4)
RBC # BLD AUTO: 3.87 M/UL (ref 4–5.4)
SODIUM SERPL-SCNC: 139 MMOL/L (ref 136–145)
TRIGL SERPL-MCNC: 33 MG/DL (ref 30–150)
TSH SERPL DL<=0.005 MIU/L-ACNC: 1.22 UIU/ML (ref 0.4–4)
WBC # BLD AUTO: 4.01 K/UL (ref 3.9–12.7)

## 2022-08-10 PROCEDURE — 36415 COLL VENOUS BLD VENIPUNCTURE: CPT | Performed by: HOSPITALIST

## 2022-08-10 PROCEDURE — 80053 COMPREHEN METABOLIC PANEL: CPT | Performed by: HOSPITALIST

## 2022-08-10 PROCEDURE — 84443 ASSAY THYROID STIM HORMONE: CPT | Performed by: HOSPITALIST

## 2022-08-10 PROCEDURE — 80061 LIPID PANEL: CPT | Performed by: HOSPITALIST

## 2022-08-10 PROCEDURE — 85025 COMPLETE CBC W/AUTO DIFF WBC: CPT | Performed by: HOSPITALIST

## 2022-08-17 ENCOUNTER — HOSPITAL ENCOUNTER (EMERGENCY)
Facility: HOSPITAL | Age: 37
Discharge: HOME | End: 2022-08-17
Attending: EMERGENCY MEDICINE
Payer: MEDICAID

## 2022-08-17 VITALS
SYSTOLIC BLOOD PRESSURE: 94 MMHG | TEMPERATURE: 99 F | DIASTOLIC BLOOD PRESSURE: 52 MMHG | HEART RATE: 90 BPM | BODY MASS INDEX: 20.37 KG/M2 | WEIGHT: 115 LBS | RESPIRATION RATE: 18 BRPM | OXYGEN SATURATION: 99 %

## 2022-08-17 DIAGNOSIS — S92.514A CLOSED NONDISPLACED FRACTURE OF PROXIMAL PHALANX OF LESSER TOE OF RIGHT FOOT, INITIAL ENCOUNTER: Primary | ICD-10-CM

## 2022-08-17 PROCEDURE — 25000003 PHARM REV CODE 250: Performed by: EMERGENCY MEDICINE

## 2022-08-17 PROCEDURE — 99283 EMERGENCY DEPT VISIT LOW MDM: CPT | Mod: 25

## 2022-08-17 RX ORDER — IBUPROFEN 600 MG/1
600 TABLET ORAL
Status: COMPLETED | OUTPATIENT
Start: 2022-08-17 | End: 2022-08-17

## 2022-08-17 RX ORDER — ACETAMINOPHEN 500 MG
500 TABLET ORAL
Status: COMPLETED | OUTPATIENT
Start: 2022-08-17 | End: 2022-08-17

## 2022-08-17 RX ADMIN — IBUPROFEN 600 MG: 600 TABLET, FILM COATED ORAL at 11:08

## 2022-08-17 RX ADMIN — ACETAMINOPHEN 500 MG: 500 TABLET ORAL at 11:08

## 2022-08-17 NOTE — ED NOTES
Patient arrives for evaluation of right second toe pain x 3 days - denies injury or trauma - states it has been hurting to walk on it - patient uses walker at home due to cerebral palsy - no swelling noted or redness - await orders

## 2022-08-17 NOTE — ED PROVIDER NOTES
Encounter Date: 2022       History     Chief Complaint   Patient presents with    Toe Pain     Pt c/o swelling to right 2nd toe. Pt denies any injury or trauma. Painful ambulation and presents in wheelchair from home due to CP.     Medina Hernandez is a 36 y.o. female who  has a past medical history of Cerebral palsy, Gait abnormality, and Low blood pressure.    The patient presents to the ED due to right 2nd toe pain.  Patient reports pain a past 4 days.  She reports no history of trauma.  No new shoes.  Patient reports not taking any medication for pain.  No exacerbating relieving factors are noted.  No other concerns.        Review of patient's allergies indicates:  No Known Allergies  Past Medical History:   Diagnosis Date    Cerebral palsy     Gait abnormality     Low blood pressure      Past Surgical History:   Procedure Laterality Date     SECTION      LAPAROSCOPIC SALPINGECTOMY Bilateral 7/10/2020    Procedure: SALPINGECTOMY, LAPAROSCOPIC;  Surgeon: Rosa Mcclendon MD;  Location: [facility];  Service: OB/GYN;  Laterality: Bilateral;  video confirmed  CW     No family history on file.  Social History     Tobacco Use    Smoking status: Never Smoker    Smokeless tobacco: Never Used   Substance Use Topics    Alcohol use: No    Drug use: No     Review of Systems   Constitutional: Negative for fever.   Respiratory: Negative for shortness of breath.    Cardiovascular: Negative for chest pain.   Musculoskeletal: Positive for arthralgias.   Skin: Negative for wound.   Allergic/Immunologic: Negative for immunocompromised state.   All other systems reviewed and are negative.      Physical Exam     Initial Vitals [22 1044]   BP Pulse Resp Temp SpO2   (!) 94/52 90 18 99 °F (37.2 °C) 99 %      MAP       --         Physical Exam    Constitutional: No distress.   Sensation intact to light touch.  DP/PT pulses palpable.  No midfoot tenderness.  Diffuse tenderness to palpation of the 2nd toe.  No  wounds minimal swelling   HENT:   Head: Normocephalic and atraumatic.   Eyes: Conjunctivae are normal.   Cardiovascular: Intact distal pulses.   Pulmonary/Chest: No respiratory distress.   Musculoskeletal:      Comments: Right lower extremity:     Neurological: She is alert.   Skin: Skin is warm and dry.   Psychiatric: She has a normal mood and affect.         ED Course   Procedures  Labs Reviewed - No data to display       Imaging Results          X-Ray Toe 2 or More Views Right (Final result)  Result time 08/17/22 11:50:32    Final result by Tacho Flores Jr., MD (08/17/22 11:50:32)                 Impression:      Second proximal phalanx fracture      Electronically signed by: Tacho Wilburn Jr  Date:    08/17/2022  Time:    11:50             Narrative:    EXAMINATION:  XR TOE 2 OR MORE VIEWS RIGHT    CLINICAL HISTORY:  2nd toe pain;    TECHNIQUE:  XR TOE 2 OR MORE VIEWS RIGHT    COMPARISON:  None    FINDINGS:  There is a horizontal fracture through the neck of the 2nd proximal phalanx.  No significant angulation or displacement.  No other abnormalities noted.                                 Medications   ibuprofen tablet 600 mg (600 mg Oral Given 8/17/22 1116)   acetaminophen tablet 500 mg (500 mg Oral Given 8/17/22 1116)     Medical Decision Making:   Differential Diagnosis:   Differential Diagnosis includes, but is not limited to:  Fracture, dislocation, compartment syndrome, nerve injury/palsy, vascular injury, rhabdomyolysis, hemarthrosis, septic joint, bursitis, muscle strain, ligament tear/sprain, laceration with foreign body, abrasion, soft tissue contusion, osteoarthritis.    ED Management:  X-ray demonstrates findings consistent with a toe fracture.  Patient is resting comfortably on reassessment pain is controlled no apparent distress.  Will placed in hard sole shoe refer to podiatry.    Patient states she can take Motrin and Tylenol at home for pain.  No emergent process has been identified  today.  No other concerns noted.  Patient understands the need to follow-up with podiatry return precautions discussed for worsening symptoms including new pains or any other concerns.    After taking into careful account the historical factors and physical exam findings of the patient's presentation today, in conjunction with the empirical and objective data obtained on ED workup, no acute emergent medical condition has been identified. The patient appears to be low risk for an emergent medical condition and I feel it is safe and appropriate at this time for the patient to be discharged to follow-up as detailed in their discharge instructions for reevaluation and possible continued outpatient workup and management. I have discussed the specifics of the workup with the patient and the patient has verbalized understanding of the details of the workup, the diagnosis, the treatment plan, and the need for outpatient follow-up.  Although the patient has no emergent etiology today this does not preclude the development of an emergent condition so in addition, I have advised the patient that they can return to the ED and/or activate EMS at any time with worsening of their symptoms, change of their symptoms, or with any other medical complaint.  The patient remained comfortable and stable during their visit in the ED.  Discharge and follow-up instructions discussed with the patient who expressed understanding and willingness to comply with my recommendations.                        Clinical Impression:   Final diagnoses:  [S92.514A] Closed nondisplaced fracture of proximal phalanx of lesser toe of right foot, initial encounter (Primary)          ED Disposition Condition    Discharge Stable        ED Prescriptions     None        Follow-up Information     Follow up With Specialties Details Why Contact Info Additional Information    [facility] Schedule an appointment as soon as possible for a visit   [address], [facility] [address]  [facility] [id number] [phone] Please park in [address] or [address] and use Mike [address]. Take Medical Office Bldg elevators.        Portions of this note were dictated using voice recognition software and may contain dictation related errors in spelling/grammar/syntax not found on text review       Wyatt Vidal Jr., MD  08/17/22 8264

## 2022-08-24 ENCOUNTER — OFFICE VISIT (OUTPATIENT)
Dept: INTERNAL MEDICINE | Facility: CLINIC | Age: 37
End: 2022-08-24
Payer: MEDICAID

## 2022-08-24 VITALS
TEMPERATURE: 98 F | HEART RATE: 74 BPM | SYSTOLIC BLOOD PRESSURE: 86 MMHG | HEIGHT: 63 IN | OXYGEN SATURATION: 99 % | BODY MASS INDEX: 19.06 KG/M2 | DIASTOLIC BLOOD PRESSURE: 52 MMHG | WEIGHT: 107.56 LBS | RESPIRATION RATE: 19 BRPM

## 2022-08-24 DIAGNOSIS — Z78.9 IMPAIRED MOBILITY AND ACTIVITIES OF DAILY LIVING: ICD-10-CM

## 2022-08-24 DIAGNOSIS — Z00.00 ANNUAL PHYSICAL EXAM: Primary | ICD-10-CM

## 2022-08-24 DIAGNOSIS — Z74.09 IMPAIRED MOBILITY AND ACTIVITIES OF DAILY LIVING: ICD-10-CM

## 2022-08-24 DIAGNOSIS — G80.9 CEREBRAL PALSY, UNSPECIFIED TYPE: ICD-10-CM

## 2022-08-24 DIAGNOSIS — R26.9 GAIT DISORDER: ICD-10-CM

## 2022-08-24 DIAGNOSIS — R31.29 MICROSCOPIC HEMATURIA: ICD-10-CM

## 2022-08-24 PROCEDURE — 3074F PR MOST RECENT SYSTOLIC BLOOD PRESSURE < 130 MM HG: ICD-10-PCS | Mod: CPTII,,, | Performed by: HOSPITALIST

## 2022-08-24 PROCEDURE — 99999 PR PBB SHADOW E&M-EST. PATIENT-LVL IV: CPT | Mod: PBBFAC,,, | Performed by: HOSPITALIST

## 2022-08-24 PROCEDURE — 99395 PR PREVENTIVE VISIT,EST,18-39: ICD-10-PCS | Mod: S$PBB,,, | Performed by: HOSPITALIST

## 2022-08-24 PROCEDURE — 1159F PR MEDICATION LIST DOCUMENTED IN MEDICAL RECORD: ICD-10-PCS | Mod: CPTII,,, | Performed by: HOSPITALIST

## 2022-08-24 PROCEDURE — 3074F SYST BP LT 130 MM HG: CPT | Mod: CPTII,,, | Performed by: HOSPITALIST

## 2022-08-24 PROCEDURE — 3078F PR MOST RECENT DIASTOLIC BLOOD PRESSURE < 80 MM HG: ICD-10-PCS | Mod: CPTII,,, | Performed by: HOSPITALIST

## 2022-08-24 PROCEDURE — 1160F RVW MEDS BY RX/DR IN RCRD: CPT | Mod: CPTII,,, | Performed by: HOSPITALIST

## 2022-08-24 PROCEDURE — 99999 PR PBB SHADOW E&M-EST. PATIENT-LVL IV: ICD-10-PCS | Mod: PBBFAC,,, | Performed by: HOSPITALIST

## 2022-08-24 PROCEDURE — 1160F PR REVIEW ALL MEDS BY PRESCRIBER/CLIN PHARMACIST DOCUMENTED: ICD-10-PCS | Mod: CPTII,,, | Performed by: HOSPITALIST

## 2022-08-24 PROCEDURE — 3008F BODY MASS INDEX DOCD: CPT | Mod: CPTII,,, | Performed by: HOSPITALIST

## 2022-08-24 PROCEDURE — 99395 PREV VISIT EST AGE 18-39: CPT | Mod: S$PBB,,, | Performed by: HOSPITALIST

## 2022-08-24 PROCEDURE — 1159F MED LIST DOCD IN RCRD: CPT | Mod: CPTII,,, | Performed by: HOSPITALIST

## 2022-08-24 PROCEDURE — 3078F DIAST BP <80 MM HG: CPT | Mod: CPTII,,, | Performed by: HOSPITALIST

## 2022-08-24 PROCEDURE — 99214 OFFICE O/P EST MOD 30 MIN: CPT | Mod: PBBFAC,PO | Performed by: HOSPITALIST

## 2022-08-24 PROCEDURE — 3008F PR BODY MASS INDEX (BMI) DOCUMENTED: ICD-10-PCS | Mod: CPTII,,, | Performed by: HOSPITALIST

## 2022-08-24 NOTE — PROGRESS NOTES
Subjective:     @Patient ID: Medina Hernandez is a 36 y.o. female.    Chief Complaint: Annual Exam    HPI    35 yo F with cerebral palsy, muscle weakness, impaired mobiltiy presents for annual exam:   Pt reports doing ok. Did have fracture of 2nd toe in foot recently. Not sure the cause, thinks she bumped her toe on something. Would like a different boot. Has upcoming appt with podiatry  Also reports would like to have therapy    bp low normal. Pt asymptomatic        Lipid disorders/ASCVD risk (ages >/= 45 or >/= 20 if increased risk ): ordered   DM (>45y yearly or if obese, HTN): A1c       Eye exam: n/a   Breast Cancer (40-50y discretion of pt, 50-74y every 1-2 years): Mammogram n/a   Cervical Cancer (Pap Smear ages 21-65 every 3 years or Pap + HPV q5 years after 30 years of age): Done 2021  Colorectal Cancer (normal risk 50-75yr): Colonoscopy n/a     Vaccines:    Influenza (yearly): n/a   Tetanus (every 10 yrs - 1st tdap): utd 2016.    PPSV23(>64yo or <65 w/ lung dz, smoking, DM) : n/a   PCV13 (> 65 or <65 w/ immunocompromised): n/a   Zoster (>59yo) n/a   Covid19: utd     Exercise: none   Diet: Vegetarian diet, does eat Halal meat    Review of Systems   Constitutional: Negative for chills and fever.   HENT: Negative for congestion and sore throat.    Eyes: Negative for pain and visual disturbance.   Respiratory: Negative for cough and shortness of breath.    Cardiovascular: Negative for chest pain and leg swelling.   Gastrointestinal: Negative for abdominal pain, nausea and vomiting.   Endocrine: Negative for polydipsia and polyuria.   Genitourinary: Negative for difficulty urinating and dysuria.   Musculoskeletal: Positive for arthralgias. Negative for back pain.   Skin: Negative for rash and wound.   Neurological: Negative for dizziness, weakness and headaches.   Psychiatric/Behavioral: Negative for agitation and confusion.     Past medical history, surgical history, and family medical history reviewed and updated  "as appropriate.    Medications and allergies reviewed.     Objective:     Vitals:    08/24/22 1134 08/24/22 1145   BP: (!) 84/50 (!) 86/52   BP Location: Right arm    Patient Position: Sitting    BP Method: Large (Manual)    Pulse: 74    Resp: 19    Temp: 98 °F (36.7 °C)    TempSrc: Temporal    SpO2: 99%    Weight: 48.8 kg (107 lb 9.4 oz)    Height: 5' 3" (1.6 m)      There is no height or weight on file to calculate BMI.  Physical Exam  Vitals reviewed.   Constitutional:       General: She is not in acute distress.     Appearance: She is well-developed.   HENT:      Head: Normocephalic and atraumatic.      Right Ear: Tympanic membrane normal.      Left Ear: Tympanic membrane normal.      Mouth/Throat:      Mouth: Mucous membranes are moist.      Pharynx: No oropharyngeal exudate.   Eyes:      General:         Right eye: No discharge.         Left eye: No discharge.      Conjunctiva/sclera: Conjunctivae normal.   Cardiovascular:      Rate and Rhythm: Normal rate and regular rhythm.      Heart sounds: No murmur heard.    No friction rub.   Pulmonary:      Effort: Pulmonary effort is normal.      Breath sounds: Normal breath sounds.   Abdominal:      General: Bowel sounds are normal. There is no distension.      Palpations: Abdomen is soft.      Tenderness: There is no abdominal tenderness. There is no guarding.   Musculoskeletal:      Cervical back: Normal range of motion and neck supple.      Right lower leg: No edema.      Left lower leg: No edema.      Comments: +2nd toe of R foot with mild swelling   Lymphadenopathy:      Cervical: No cervical adenopathy.   Skin:     General: Skin is warm and dry.   Neurological:      Mental Status: She is alert and oriented to person, place, and time.   Psychiatric:         Mood and Affect: Mood normal.         Behavior: Behavior normal.         Lab Results   Component Value Date    WBC 4.01 08/10/2022    HGB 11.8 (L) 08/10/2022    HCT 35.7 (L) 08/10/2022     08/10/2022 "    CHOL 129 08/10/2022    TRIG 33 08/10/2022    HDL 48 08/10/2022    ALT 10 08/10/2022    AST 13 08/10/2022     08/10/2022    K 4.0 08/10/2022     08/10/2022    CREATININE 0.6 08/10/2022    BUN 12 08/10/2022    CO2 26 08/10/2022    TSH 1.215 08/10/2022       Assessment:     1. Annual physical exam    2. Cerebral palsy, unspecified type    3. Gait disorder    4. Impaired mobility and activities of daily living    5. Microscopic hematuria      Plan:   Medina was seen today for annual exam.    Diagnoses and all orders for this visit:    Annual physical exam  - reviewed lab results with pt in clinic     Cerebral palsy, unspecified type  - Continue f/u with PMR and medications as prescribed  -     Ambulatory referral/consult to Home Health; Future    Gait disorder  -     Ambulatory referral/consult to Home Health; Future    Impaired mobility and activities of daily living  -     Ambulatory referral/consult to Home Health; Future    Microscopic hematuria  - pt follows with urogyn      rtc 1 year and flores Cardoso MD  Internal Medicine    8/24/2022

## 2022-08-26 PROCEDURE — G0180 PR HOME HEALTH MD CERTIFICATION: ICD-10-PCS | Mod: ,,, | Performed by: HOSPITALIST

## 2022-08-26 PROCEDURE — G0180 MD CERTIFICATION HHA PATIENT: HCPCS | Mod: ,,, | Performed by: HOSPITALIST

## 2022-08-29 ENCOUNTER — TELEPHONE (OUTPATIENT)
Dept: INTERNAL MEDICINE | Facility: CLINIC | Age: 37
End: 2022-08-29
Payer: MEDICAID

## 2022-08-29 NOTE — TELEPHONE ENCOUNTER
----- Message from Luisa Sullivan sent at 8/29/2022  2:22 PM CDT -----  Contact: Liz gilmore/ [facility]   [phone]  Kristine w/ [facility] would like to get a verbal order for a  . Please call and advise

## 2022-08-31 ENCOUNTER — TELEPHONE (OUTPATIENT)
Dept: INTERNAL MEDICINE | Facility: CLINIC | Age: 37
End: 2022-08-31
Payer: MEDICAID

## 2022-08-31 DIAGNOSIS — Z74.09 IMPAIRED MOBILITY AND ACTIVITIES OF DAILY LIVING: ICD-10-CM

## 2022-08-31 DIAGNOSIS — Z78.9 IMPAIRED MOBILITY AND ACTIVITIES OF DAILY LIVING: ICD-10-CM

## 2022-08-31 DIAGNOSIS — G80.9 CEREBRAL PALSY, UNSPECIFIED TYPE: Primary | ICD-10-CM

## 2022-08-31 NOTE — TELEPHONE ENCOUNTER
----- Message from Amilcar Jovel sent at 8/31/2022  8:36 AM CDT -----  Contact: [facility] Liz [phone]  She is returning your call. She is also requesting an order for a tub transfer bench.     Thank you

## 2022-08-31 NOTE — TELEPHONE ENCOUNTER
[facility]   FAX#[phone]    TELEPHONE#[phone]    THE PATIENT REQUESTING ORDERS FOR A TUB TRANSFER BENCH

## 2022-09-01 ENCOUNTER — TELEPHONE (OUTPATIENT)
Dept: INTERNAL MEDICINE | Facility: CLINIC | Age: 37
End: 2022-09-01
Payer: MEDICAID

## 2022-09-01 DIAGNOSIS — G80.9 CEREBRAL PALSY, UNSPECIFIED TYPE: ICD-10-CM

## 2022-09-01 DIAGNOSIS — Z74.09 IMPAIRED MOBILITY AND ACTIVITIES OF DAILY LIVING: Primary | ICD-10-CM

## 2022-09-01 DIAGNOSIS — Z78.9 IMPAIRED MOBILITY AND ACTIVITIES OF DAILY LIVING: Primary | ICD-10-CM

## 2022-09-01 NOTE — TELEPHONE ENCOUNTER
Pt is requesting  , per home health agency melissa : Patients insurance does not cover a .  Please refer to [facility].  Please place referral for [facility].  Thank you

## 2022-09-01 NOTE — TELEPHONE ENCOUNTER
----- Message from Berna Wick sent at 9/1/2022  4:01 PM CDT -----  Regarding: update  Contact: [facility] [phone]  Type: Home Health (orders, updates, clarifications, etc.)    Home Health Agency/Nurse:[facility] [phone]    Phone number:[facility] [phone]    Reason for call: Patients insurance does not cover a .  Please refer to [facility]    Comments:

## 2022-09-02 NOTE — TELEPHONE ENCOUNTER
Spoke with pts home health nurse Cherie to inform her that a referral for a  that is in network with her insurance has been ordered . Informed her that a referral coordinator will be contacting her soon.

## 2022-09-09 ENCOUNTER — DOCUMENT SCAN (OUTPATIENT)
Dept: HOME HEALTH SERVICES | Facility: HOSPITAL | Age: 37
End: 2022-09-09
Payer: MEDICAID

## 2022-09-23 ENCOUNTER — DOCUMENT SCAN (OUTPATIENT)
Dept: HOME HEALTH SERVICES | Facility: HOSPITAL | Age: 37
End: 2022-09-23
Payer: MEDICAID

## 2022-09-27 ENCOUNTER — EXTERNAL HOME HEALTH (OUTPATIENT)
Dept: HOME HEALTH SERVICES | Facility: HOSPITAL | Age: 37
End: 2022-09-27
Payer: MEDICAID

## 2022-10-27 ENCOUNTER — DOCUMENT SCAN (OUTPATIENT)
Dept: HOME HEALTH SERVICES | Facility: HOSPITAL | Age: 37
End: 2022-10-27
Payer: MEDICAID

## 2022-11-22 ENCOUNTER — PATIENT MESSAGE (OUTPATIENT)
Dept: UROGYNECOLOGY | Facility: CLINIC | Age: 37
End: 2022-11-22
Payer: MEDICAID

## 2022-11-30 ENCOUNTER — HOSPITAL ENCOUNTER (EMERGENCY)
Facility: HOSPITAL | Age: 37
Discharge: HOME | End: 2022-11-30
Attending: EMERGENCY MEDICINE
Payer: MEDICAID

## 2022-11-30 VITALS
WEIGHT: 115 LBS | HEART RATE: 85 BPM | TEMPERATURE: 99 F | SYSTOLIC BLOOD PRESSURE: 103 MMHG | BODY MASS INDEX: 20.37 KG/M2 | RESPIRATION RATE: 18 BRPM | DIASTOLIC BLOOD PRESSURE: 58 MMHG | OXYGEN SATURATION: 100 %
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
## 2022-11-30 DIAGNOSIS — S02.2XXA CLOSED FRACTURE OF NASAL BONE, INITIAL ENCOUNTER: Primary | ICD-10-CM

## 2022-11-30 PROCEDURE — 99283 EMERGENCY DEPT VISIT LOW MDM: CPT

## 2022-11-30 NOTE — ED PROVIDER NOTES
"Encounter Date: 2022       History     Chief Complaint   Patient presents with    Facial Injury     Pt with cerebral palsy had a fall a few days ago and reports nasal pain. Visible swelling and light bruising. Pt denies any LOC.      36-year-old female presents to ED with concern of nasal pain and bruising after fall that occurred 3 days ago.  Patient has significant history of cerebral palsy with reoccurring falls.  Patient reports she was using her walker but her feet stumbled, causing her to fall forward and contused nose.  No LOC.  No use of blood thinners.  She did have small nosebleed that was quickly controlled with no bleeding since.  She has noticed continued bruising and swelling, so she reports ED today "to get checked out".  Pain to area described as sharp, worse with touch, nonradiating, severity 4/10.  No lightheadedness dizziness, headaches, visual changes, neck or lower back pain or other reported injuries.  No other acute complaints at this time.    The history is provided by the patient.   Review of patient's allergies indicates:  No Known Allergies  Past Medical History:   Diagnosis Date    Cerebral palsy     Gait abnormality     Low blood pressure      Past Surgical History:   Procedure Laterality Date     SECTION      LAPAROSCOPIC SALPINGECTOMY Bilateral 7/10/2020    Procedure: SALPINGECTOMY, LAPAROSCOPIC;  Surgeon: Rosa Mcclendon MD;  Location: [facility] OR;  Service: OB/GYN;  Laterality: Bilateral;  video confirmed  CW     No family history on file.  Social History     Tobacco Use    Smoking status: Never    Smokeless tobacco: Never   Substance Use Topics    Alcohol use: No    Drug use: No     Review of Systems   HENT:  Positive for nosebleeds (Resolved). Negative for dental problem and ear pain.    Eyes:  Negative for visual disturbance.   Neurological:  Negative for dizziness, weakness, light-headedness, numbness and headaches.     Physical Exam     Initial Vitals [22 " 1113]   BP Pulse Resp Temp SpO2   (!) 103/58 85 18 98.8 °F (37.1 °C) 100 %      MAP       --         Physical Exam    Nursing note and vitals reviewed.  Constitutional: She appears well-developed and well-nourished. She is active. She does not have a sickly appearance. She does not appear ill. No distress.   HENT:   Head: Normocephalic and atraumatic.   No facial swelling.  Bruising with very slight swelling noted to bridge of nose.  No open wounds, lacerations or abrasions.  No blood noted within nares with no septal hematoma.   Neck:   Normal range of motion.  Musculoskeletal:      Cervical back: Normal range of motion. No spinous process tenderness or muscular tenderness.     Neurological: She is alert. GCS eye subscore is 4. GCS verbal subscore is 5. GCS motor subscore is 6.   Skin: Skin is warm and dry.   Psychiatric: She has a normal mood and affect. Her speech is normal and behavior is normal.       ED Course   Procedures  Labs Reviewed - No data to display       Imaging Results              X-Ray Nasal Bones (Final result)  Result time 11/30/22 13:50:14      Final result by Grayson Echevarria MD (11/30/22 13:50:14)                   Impression:      As above.      Electronically signed by: Grayson Echevarria  Date:    11/30/2022  Time:    13:50               Narrative:    EXAMINATION:  XR NASAL BONES    CLINICAL HISTORY:  Unspecified fall, initial encounter    TECHNIQUE:  Three views of the nasal bones.    COMPARISON:  Maxillofacial CT performed 08/13/2021.    FINDINGS:  Findings suggesting possible transversely oriented fracture through the nasal arch, with possible depression of the inferior fracture fragment.  Correlation point tenderness would be recommended, with consideration of CT for sensitive and specific assessment.    Elsewhere, no definite additional potential fractures noted within the field of view examination.  No definite fluid levels within the visualized pneumatized paranasal sinuses or  temporal bones.                                       Medications - No data to display  Medical Decision Making:   Initial Assessment:   Patient presents with concern of pain to nose after fall that occurred 3 days ago.  No LOC. No use of blood thinners.  No septal hematoma.  Differential Diagnosis:   Fall, contusion, strain, sprain, dislocation, fracture  ED Management:  X-ray nasal bones    Images showing possible transversely oriented fracture through the nasal arch, with possible depression of the inferior fracture fragment.  Will plan to continue with conservative care.  She reports her pain symptoms are very mild.  Encouraged Tylenol/ibuprofen as needed for pain symptoms, monitor symptoms closely, PCP/ENT follow-up.  Ambulatory referral sent to ENT.  ED return precautions discussed.  Patient states her understanding and agrees with plan.                          Clinical Impression:   Final diagnoses:  [W19.XXXA] Fall  [S02.2XXA] Closed fracture of nasal bone, initial encounter (Primary)        ED Disposition Condition    Discharge Stable          ED Prescriptions    None       Follow-up Information       Follow up With Specialties Details Why Contact Info    Ivett Cardoso MD Internal Medicine   [address] [facility] [address]  [phone]               Amos Amato PA-C  11/30/22 [id number]

## 2022-11-30 NOTE — DISCHARGE INSTRUCTIONS

## 2022-12-01 ENCOUNTER — TELEPHONE (OUTPATIENT)
Dept: EMERGENCY MEDICINE | Facility: HOSPITAL | Age: 37
End: 2022-12-01
Payer: MEDICAID

## 2022-12-02 ENCOUNTER — PATIENT MESSAGE (OUTPATIENT)
Dept: OTOLARYNGOLOGY | Facility: CLINIC | Age: 37
End: 2022-12-02
Payer: MEDICAID

## 2022-12-05 ENCOUNTER — TELEPHONE (OUTPATIENT)
Dept: UROGYNECOLOGY | Facility: CLINIC | Age: 37
End: 2022-12-05
Payer: MEDICAID

## 2022-12-05 NOTE — TELEPHONE ENCOUNTER
----- Message from Karime Sanchez sent at 12/5/2022 11:20 AM CST -----  Type:  Patient Returning Call    Who Called: pt   Who Left Message for Patient: pt   Does the patient know what this is regarding?: pt is looking to change her appt date   Would the patient rather a call back or a response via [facility]?  Call   Best Call Back Number:[phone]  Additional Information: appt

## 2022-12-05 NOTE — TELEPHONE ENCOUNTER
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 Pt voiced understanding and call ended.

## 2022-12-07 ENCOUNTER — TELEPHONE (OUTPATIENT)
Dept: UROGYNECOLOGY | Facility: CLINIC | Age: 37
End: 2022-12-07
Payer: MEDICAID

## 2023-01-25 ENCOUNTER — OFFICE VISIT (OUTPATIENT)
Dept: OTOLARYNGOLOGY | Facility: CLINIC | Age: 38
End: 2023-01-25
Payer: MEDICAID

## 2023-01-25 ENCOUNTER — OFFICE VISIT (OUTPATIENT)
Dept: UROGYNECOLOGY | Facility: CLINIC | Age: 38
End: 2023-01-25
Payer: MEDICAID

## 2023-01-25 VITALS — BODY MASS INDEX: 20.39 KG/M2 | SYSTOLIC BLOOD PRESSURE: 134 MMHG | HEIGHT: 63 IN | DIASTOLIC BLOOD PRESSURE: 80 MMHG

## 2023-01-25 VITALS — HEIGHT: 63 IN | BODY MASS INDEX: 20.39 KG/M2 | WEIGHT: 115.06 LBS

## 2023-01-25 DIAGNOSIS — N39.46 MIXED STRESS AND URGE URINARY INCONTINENCE: ICD-10-CM

## 2023-01-25 DIAGNOSIS — H61.23 BILATERAL IMPACTED CERUMEN: ICD-10-CM

## 2023-01-25 DIAGNOSIS — M95.0 DEFORMITY OF EXTERNAL NOSE: ICD-10-CM

## 2023-01-25 DIAGNOSIS — G80.9 CEREBRAL PALSY, UNSPECIFIED TYPE: ICD-10-CM

## 2023-01-25 DIAGNOSIS — N90.5 VULVAR ATROPHY: ICD-10-CM

## 2023-01-25 DIAGNOSIS — K59.00 CONSTIPATION, UNSPECIFIED CONSTIPATION TYPE: ICD-10-CM

## 2023-01-25 DIAGNOSIS — S02.2XXA CLOSED FRACTURE OF NASAL BONE, INITIAL ENCOUNTER: Primary | ICD-10-CM

## 2023-01-25 DIAGNOSIS — N39.0 FREQUENT UTI: ICD-10-CM

## 2023-01-25 DIAGNOSIS — R39.11 URINARY HESITANCY: Primary | ICD-10-CM

## 2023-01-25 PROCEDURE — 51701 PR INSERTION OF NON-INDWELLING BLADDER CATHETERIZATION FOR RESIDUAL UR: ICD-10-PCS | Mod: S$PBB,,, | Performed by: NURSE PRACTITIONER

## 2023-01-25 PROCEDURE — 99213 OFFICE O/P EST LOW 20 MIN: CPT | Mod: PBBFAC | Performed by: PHYSICIAN ASSISTANT

## 2023-01-25 PROCEDURE — 3079F DIAST BP 80-89 MM HG: CPT | Mod: CPTII,,, | Performed by: NURSE PRACTITIONER

## 2023-01-25 PROCEDURE — 99203 PR OFFICE/OUTPT VISIT, NEW, LEVL III, 30-44 MIN: ICD-10-PCS | Mod: S$PBB,,, | Performed by: PHYSICIAN ASSISTANT

## 2023-01-25 PROCEDURE — 87077 CULTURE AEROBIC IDENTIFY: CPT | Performed by: NURSE PRACTITIONER

## 2023-01-25 PROCEDURE — 87186 SC STD MICRODIL/AGAR DIL: CPT | Performed by: NURSE PRACTITIONER

## 2023-01-25 PROCEDURE — 3008F PR BODY MASS INDEX (BMI) DOCUMENTED: ICD-10-PCS | Mod: CPTII,,, | Performed by: NURSE PRACTITIONER

## 2023-01-25 PROCEDURE — 99999 PR PBB SHADOW E&M-EST. PATIENT-LVL III: CPT | Mod: PBBFAC,,, | Performed by: NURSE PRACTITIONER

## 2023-01-25 PROCEDURE — 3079F PR MOST RECENT DIASTOLIC BLOOD PRESSURE 80-89 MM HG: ICD-10-PCS | Mod: CPTII,,, | Performed by: NURSE PRACTITIONER

## 2023-01-25 PROCEDURE — 99214 PR OFFICE/OUTPT VISIT, EST, LEVL IV, 30-39 MIN: ICD-10-PCS | Mod: 25,S$PBB,, | Performed by: NURSE PRACTITIONER

## 2023-01-25 PROCEDURE — 87088 URINE BACTERIA CULTURE: CPT | Performed by: NURSE PRACTITIONER

## 2023-01-25 PROCEDURE — 3008F BODY MASS INDEX DOCD: CPT | Mod: CPTII,,, | Performed by: NURSE PRACTITIONER

## 2023-01-25 PROCEDURE — 1159F PR MEDICATION LIST DOCUMENTED IN MEDICAL RECORD: ICD-10-PCS | Mod: CPTII,,, | Performed by: NURSE PRACTITIONER

## 2023-01-25 PROCEDURE — 3008F PR BODY MASS INDEX (BMI) DOCUMENTED: ICD-10-PCS | Mod: CPTII,,, | Performed by: PHYSICIAN ASSISTANT

## 2023-01-25 PROCEDURE — 87086 URINE CULTURE/COLONY COUNT: CPT | Performed by: NURSE PRACTITIONER

## 2023-01-25 PROCEDURE — 1160F PR REVIEW ALL MEDS BY PRESCRIBER/CLIN PHARMACIST DOCUMENTED: ICD-10-PCS | Mod: CPTII,,, | Performed by: NURSE PRACTITIONER

## 2023-01-25 PROCEDURE — 3008F BODY MASS INDEX DOCD: CPT | Mod: CPTII,,, | Performed by: PHYSICIAN ASSISTANT

## 2023-01-25 PROCEDURE — 3075F PR MOST RECENT SYSTOLIC BLOOD PRESS GE 130-139MM HG: ICD-10-PCS | Mod: CPTII,,, | Performed by: NURSE PRACTITIONER

## 2023-01-25 PROCEDURE — 99213 OFFICE O/P EST LOW 20 MIN: CPT | Mod: PBBFAC,27 | Performed by: NURSE PRACTITIONER

## 2023-01-25 PROCEDURE — 99999 PR PBB SHADOW E&M-EST. PATIENT-LVL III: ICD-10-PCS | Mod: PBBFAC,,, | Performed by: NURSE PRACTITIONER

## 2023-01-25 PROCEDURE — 51701 INSERT BLADDER CATHETER: CPT | Mod: PBBFAC | Performed by: NURSE PRACTITIONER

## 2023-01-25 PROCEDURE — 99203 OFFICE O/P NEW LOW 30 MIN: CPT | Mod: S$PBB,,, | Performed by: PHYSICIAN ASSISTANT

## 2023-01-25 PROCEDURE — 99214 OFFICE O/P EST MOD 30 MIN: CPT | Mod: 25,S$PBB,, | Performed by: NURSE PRACTITIONER

## 2023-01-25 PROCEDURE — 51701 INSERT BLADDER CATHETER: CPT | Mod: S$PBB,,, | Performed by: NURSE PRACTITIONER

## 2023-01-25 PROCEDURE — 1159F MED LIST DOCD IN RCRD: CPT | Mod: CPTII,,, | Performed by: NURSE PRACTITIONER

## 2023-01-25 PROCEDURE — 99999 PR PBB SHADOW E&M-EST. PATIENT-LVL III: CPT | Mod: PBBFAC,,, | Performed by: PHYSICIAN ASSISTANT

## 2023-01-25 PROCEDURE — 99999 PR PBB SHADOW E&M-EST. PATIENT-LVL III: ICD-10-PCS | Mod: PBBFAC,,, | Performed by: PHYSICIAN ASSISTANT

## 2023-01-25 PROCEDURE — 3075F SYST BP GE 130 - 139MM HG: CPT | Mod: CPTII,,, | Performed by: NURSE PRACTITIONER

## 2023-01-25 PROCEDURE — 1160F RVW MEDS BY RX/DR IN RCRD: CPT | Mod: CPTII,,, | Performed by: NURSE PRACTITIONER

## 2023-01-25 NOTE — PATIENT INSTRUCTIONS
Use debrox drops, follow instructions on box  Place 5 to 10 drops into ear  use twice daily for up to four days, then once weekly from then    Make appointment with Facial plastics.  Someone should be calling you.

## 2023-01-25 NOTE — PATIENT INSTRUCTIONS
1. Mixed urinary incontinence, urge > stress with post-void urgency:  --urine C&S today  --Empty bladder every 3 hours.  Empty well: wait a minute, lean forward on toilet.    --Avoid dietary irritants (see sheet).  Keep diary x 3-5 days to determine your irritants.  --KEGELS: do 10 in AM and 10 in PM, holding each x 10 seconds.  When you feel urge to go, STOP, KEGEL, and when urge has passed, then go to bathroom.    --PT will help   --URGE: continue vesicare 5 mg daily. Gemtessa was not covered  For dry mouth: get sour, sugar free lozenge or gum.    --STRESS:  Pessary vs. Sling.      2. Constipation:  --continue fiber supplement     3. Pelvic floor weakness  --continue pelvic floor PT   --trial vaginal valium-- can use one vaginally twice daily as needed-- use when you are having trouble urinating  --I have sent in the prescription to [facility] in [place], [place].  Their phone number is [phone].  Ask for the [employer] department.       4. Vulvar atrophy-- concerns for lichen  --twice weekly AM apply steroid ointment/cream:  Apply dime-sized amount with finger to vaginal opening, inner lips, and all external areas (including around anus) that are irritated.  DO NOT APPLY INTERNALLY.   --twice weekly PM apply estrogen cream.  Apply dime-sized amount with finger to vaginal opening, inner lips, and all external areas (including around anus) that are irritated. Also apply small amount inside vagina with finger (insert to knuckle).     5. Frequent UTIs (bladder infections):  --urine C&S  --8/2021 urine C&S (clean cath) + staph aureus (may have been skin contaminant)  --If you feel like you have a UTI, please call our office so that we can place an order for you to drop off a urine specimen at the closest [facility] lab (we will arrange).                --We will call in antibiotics for you to start right after you drop off specimen.                --In this way, we can determine:                           1)  Do you have  a UTI?                            2) If you have a UTI, is it sensitive to the antibiotics we prescribed?   --follow UTI prevention tips (see attached)  --control bowel movements/fecal cross-contamination  --change your liner pad every time you pull your pants on  --empty bladder before and after intercourse  --continue taking 1 probiotic pill (any with lactobacillus and/or acidophilus) daily  --retroperitoneal ultrasound normal  --cystoscopy normal  but with moderate trabeculations               ---with PV urgency, concern for OAB              6. Microscopic hematuria  --retroperitoneal ultrasound normal  --cystoscopy normal today but with moderate trabeculations               ---with PV urgency, concern for OAB      7.desires to stop menses with travel  --will start ocp's with [facility]  --call when you start your cycle    8. RTC 6 months for follow up

## 2023-01-25 NOTE — PROGRESS NOTES
Subjective:     HPI: Medina Hernandez is a 37 y.o. female with CP who was referred to me by Amos Amato in consultation for facial fracture.     Patient reports falling in November and went to ED on 22.  She has CP and uses walker, but sometimes has leg spasms that increases her risk of falls.  She reports swelling of nasal bridge and bruising at the time.  She underwent Xray of nasal bones which was suspicious for transverse fracture of nasal bone.     Today. She denies any associated symptoms including: changes in vision/diplopia, jaw pain/pain with chewing, nasal obstruction, facial weakness, facial paresthesias/changes in sensation, and headache.  Her main concern is nose deformity that is new since fall.     Past Medical/Past Surgical History  Past Medical History:   Diagnosis Date    Cerebral palsy     Gait abnormality     Low blood pressure      She has a past surgical history that includes  section and Laparoscopic salpingectomy (Bilateral, 7/10/2020).    Family History/Social History  Her family history is not on file.  She reports that she has never smoked. She has never used smokeless tobacco. She reports that she does not drink alcohol and does not use drugs.    Allergies/Immunizations  She has No Known Allergies.  Immunization History   Administered Date(s) Administered    COVID-19, MRNA, LN-S, PF (MODERNA FULL 0.5 ML DOSE) 2021, 2021, 2021    Influenza - Quadrivalent - PF *Preferred* (6 months and older) 2019, 2020, 10/11/2021    Tdap 2016        Medications   acetaminophen  AFLURIA QD -(3YR UP)(PF) Syrg  baclofen  CALTRATE 600 ORAL  clobetasol 0.05% Oint  estradioL  ibuprofen  LIDOcaine HCL 2%  solifenacin  traMADoL     Review of Systems     Constitutional: Negative for chills and fever.      HENT: Negative for ear discharge, ear pain, hearing loss, nosebleeds and postnasal drip.  + nasal deformity      Respiratory:  Negative for cough and  snoring.      Allergy: Negative for seasonal allergies.     Neurological: Negative for dizziness and headaches.      Hematologic: Negative for swollen glands.      Psychiatric: Negative for sleep disturbance.          Objective:     There were no vitals taken for this visit.       Constitutional:   She appears well-developed and well-nourished. Normal speech.      Head:  Normocephalic and atraumatic. Facial strength is normal.      Ears:    Right Ear: No swelling or tenderness. No mastoid tenderness. Tympanic membrane is not perforated and not erythematous.   Left Ear: No swelling or tenderness. No mastoid tenderness. Tympanic membrane is not perforated and not erythematous.   Impacted cerumen of both ears    Nose:  Mucosal edema present. No rhinorrhea, septal deviation or polyps.  No foreign bodies. Turbinate hypertrophy.  Turbinates normal and no turbinate masses.  Right sinus exhibits no maxillary sinus tenderness and no frontal sinus tenderness. Left sinus exhibits no maxillary sinus tenderness and no frontal sinus tenderness.   R/superior nasal bridge deformity    Mouth/Throat  Oropharynx clear and moist without lesions or asymmetry, normal uvula midline and lips, teeth, and gums normal. No trismus or mucous membrane lesions. No oropharyngeal exudate, posterior oropharyngeal edema or posterior oropharyngeal erythema. Tonsils present.    No TMJ tenderness or crepitus      Neck:  Neck normal without thyromegaly masses, asymmetry, normal tracheal structure, crepitus, and tenderness and phonation normal.     Pulmonary/Chest:   Effort normal.     Psychiatric:   She has a normal mood and affect. Her speech is normal and behavior is normal.     Procedure    None    Data Reviewed  I personally reviewed the chart, including any outside records, and pertinent data below:    WBC (K/uL)   Date Value   08/10/2022 4.01     Eosinophil % (%)   Date Value   08/10/2022 1.2     Eos # (K/uL)   Date Value   08/10/2022 0.1      Platelets (K/uL)   Date Value   08/10/2022 273     Glucose (mg/dL)   Date Value   08/10/2022 82     No results found for: IGE        I independently reviewed the images of the maxillofacial plain films dated 11/30/22. Pertinent findings include     Assessment & Plan:     1. Closed fracture of nasal bone, initial encounter  3. Deformity of external nose  -     No nasal obstructive symptoms reported and exam without concerning findings  - Appearance of nose changed 2/2 trauma/fracture  - Referred patient to ENT/facial plastics for surgical discussion    2. Bilateral impacted cerumen   - unable to remove in office; advised debrox drops and cessation of qtip usage   - no symptoms of hearing loss, ear pressure/pain, itching   - Proper ear hygiene discussed, including importance of cerumen for health of external auditory canal and risks of using q-tips including itching, trauma, outer ear infections, and tympanic membrane perforation   Advised cessation of q-tip instrumentation.      She will Follow up if symptoms worsen or fail to improve.  I had a discussion with the patient regarding her condition and the further workup and management options.    All questions were answered, and the patient is in agreement with the above.

## 2023-01-25 NOTE — PROGRESS NOTES
Urogyn follow up  01/25/2023  .  [facility]  [address]  [place] [address]    Medina Hernandez  [id number]  1985      Medina Hernandez is a 37 y.o.  here for a urogyn follow up for urge incontinence.    Last HPI from 09/24/2021  1)  Mixed urinary incontinence, urge > stress:    --+ urinary urgency  --not leaking urine     2. Constipation:  --taking fiber supplement.     3. Pelvic floor weakness  --has not started pelvic floor PT        4. Vulvar atrophy-- concerns for lichen  --using clobetasol and estrogen cream     5. Frequent UTIs (bladder infections):  --no current symptoms     6.microscopic hematuria  --6 rbc in cath specimen    02/09/2022  1)  Mixed urinary incontinence, urge > stress:    --+ urinary urgency/ UUI  --taking vesicare 5 mg daily--feels like she can not initiate void sometime  --voiding every hour during the day  --nocturia 2/ night--does not limit fluids  --1-2 pullups/ day  --minimally wet for the most part--occasionally soaked      2. Constipation:  --improved  --taking fiber supplement.     3. Pelvic floor weakness  --had eval  --needs to schedule follow up visit        4. Vulvar atrophy-- concerns for lichen  --using clobetasol and estrogen cream--has not been using      5. Frequent UTIs (bladder infections):  --no current symptoms     6.microscopic hematuria  --normal cysto    Changes since last visit:     1. Mixed urinary incontinence, urge > stress with post-void urgency:  --reports having trouble urinating occasionally--occurs 1-2 times weekly  --denies urinary urgency  --taking  continue vesicare 5 mg daily.  --gemtessa was not covered .      2. Constipation:  --intermittent     3. Pelvic floor weakness/tension  --getting ready to start PT     4. Vulvar atrophy-- concerns for lichen  --no longer using clobetasol     5. Frequent UTIs (bladder infections):  --denies dysuria at this time  --taking cranberry pills  --8/2021 urine C&S (clean cath) + staph aureus  (may have been skin contaminant)  --continue taking 1 probiotic pill (any with lactobacillus and/or acidophilus) daily  --retroperitoneal ultrasound normal   --cystoscopy normal  but with moderate trabeculations               ---with PV urgency, concern for OAB              6. Microscopic hematuria  --retroperitoneal ultrasound normal  --cystoscopy normal  but with moderate trabeculations               ---with PV urgency, concern for OAB          Past Medical History:   Diagnosis Date    Cerebral palsy     Gait abnormality     Low blood pressure        Past Surgical History:   Procedure Laterality Date     SECTION      LAPAROSCOPIC SALPINGECTOMY Bilateral 7/10/2020    Procedure: SALPINGECTOMY, LAPAROSCOPIC;  Surgeon: Rosa Mcclendon MD;  Location: [facility] OR;  Service: OB/GYN;  Laterality: Bilateral;  video confirmed  CW       History reviewed. No pertinent family history.    Social History     Socioeconomic History    Marital status:     Number of children: 1   Tobacco Use    Smoking status: Never    Smokeless tobacco: Never   Substance and Sexual Activity    Alcohol use: No    Drug use: No    Sexual activity: Yes     Partners: Male     Birth control/protection: Condom, Implant       Current Outpatient Medications   Medication Sig Dispense Refill    acetaminophen (TYLENOL) 500 MG tablet Take 1-2 tablets (500-1,000 mg total) by mouth 3 (three) times daily as needed for Pain.  0    AFLURIA QD -21,3YR UP,,PF, 60 mcg (15 mcg x 4)/0.5 mL Syrg ADM 0.5ML IM UTD      baclofen (LIORESAL) 20 MG tablet Take 1 tablet (20 mg total) by mouth 3 (three) times daily. 90 tablet 2    calcium carbonate (CALTRATE 600 ORAL) Take by mouth once daily.      clobetasol 0.05% (TEMOVATE) 0.05 % Oint Apply topically once daily. 30 g 3    estradioL (ESTRACE) 0.01 % (0.1 mg/gram) vaginal cream Place 1 g vaginally once daily. 42.5 g 3    ibuprofen (ADVIL,MOTRIN) 600 MG tablet Take 1 tablet (600 mg total) by mouth every 6 (six)  "hours as needed for Pain (pain). 30 tablet 1    lidocaine HCL 2% (XYLOCAINE) 2 % jelly Apply topically 3 (three) times daily. Apply to affected area three times daily prn 30 mL 1    solifenacin (VESICARE) 5 MG tablet Take 1 tablet (5 mg total) by mouth once daily. 30 tablet 11    traMADoL (ULTRAM) 50 mg tablet Take 1 tablet (50 mg total) by mouth 3 (three) times daily as needed for Pain. 90 tablet 0    ciprofloxacin HCl (CIPRO) 500 MG tablet Take 1 tablet (500 mg total) by mouth 2 (two) times daily. for 5 days 10 tablet 0    gabapentin (NEURONTIN) 300 MG capsule Take 1 capsule (300 mg total) by mouth As instructed (Take one capsule every morning and after noon, and two at bedtime (4 capsules total daily)). 120 capsule 3     No current facility-administered medications for this visit.       Review of patient's allergies indicates:  No Known Allergies    Well woman:  Pap test: 09/2021 normal HPV negative History of abnormal paps: No.  History of STIs:  No  Mammogram: n/a  Colonoscopy n/a  DEXA:  n/a    ROS:  As per HPI.      Exam  /80 (BP Location: Right arm, Patient Position: Sitting, BP Method: Medium (Manual))   Ht 5' 3" (1.6 m)   BMI 20.39 kg/m²   General: alert and oriented, no acute distress  Respiratory: normal respiratory effort  Abd: soft, non-tender, non-distended    Pelvic  Ext. Genitalia: normal external genitalia. Normal bartholin's and skeens glands  Vagina: + atrophy. Normal vaginal mucosa without lesions. No discharge noted.   Non-tender bladder base without palpable mass. +TTP in bilateral levator ani  Cervix: no lesions  Uterus:  uterus is normal size, shape, consistency and nontender   Urethra: no masses or tenderness  Urethral meatus: no lesions, caruncle or prolapse.    Impression  1. Urinary hesitancy  Urine culture      2. Constipation, unspecified constipation type        3. Frequent UTI        4. Cerebral palsy, unspecified type        5. Mixed stress and urge urinary incontinence      "   6. Vulvar atrophy          We reviewed the above issues and discussed options for short-term versus long-term management of her problems.   Plan:      1. Mixed urinary incontinence, urge > stress with post-void urgency:  --urine C&S today  --PVR normal today  --Empty bladder every 3 hours.  Empty well: wait a minute, lean forward on toilet.    --Avoid dietary irritants (see sheet).  Keep diary x 3-5 days to determine your irritants.  --KEGELS: do 10 in AM and 10 in PM, holding each x 10 seconds.  When you feel urge to go, STOP, KEGEL, and when urge has passed, then go to bathroom.    --PT will help   --URGE: continue vesicare 5 mg daily. Gemtessa was not covered  For dry mouth: get sour, sugar free lozenge or gum.    --STRESS:  Pessary vs. Sling.      2. Constipation:  --continue fiber supplement     3. Pelvic floor weakness  --continue pelvic floor PT   --trial vaginal valium-- can use one vaginally twice daily as needed-- use when you are having trouble urinating  --I have sent in the prescription to [facility] in [place], [place].  Their phone number is [phone].  Ask for the [employer] department.       4. Vulvar atrophy-- concerns for lichen  --twice weekly AM apply steroid ointment/cream:  Apply dime-sized amount with finger to vaginal opening, inner lips, and all external areas (including around anus) that are irritated.  DO NOT APPLY INTERNALLY.   --twice weekly PM apply estrogen cream.  Apply dime-sized amount with finger to vaginal opening, inner lips, and all external areas (including around anus) that are irritated. Also apply small amount inside vagina with finger (insert to knuckle).     5. Frequent UTIs (bladder infections):  --urine C&S  --8/2021 urine C&S (clean cath) + staph aureus (may have been skin contaminant)  --If you feel like you have a UTI, please call our office so that we can place an order for you to drop off a urine specimen at the closest [facility] lab (we will arrange).                 --We will call in antibiotics for you to start right after you drop off specimen.                --In this way, we can determine:                           1)  Do you have a UTI?                            2) If you have a UTI, is it sensitive to the antibiotics we prescribed?   --follow UTI prevention tips (see attached)  --control bowel movements/fecal cross-contamination  --change your liner pad every time you pull your pants on  --empty bladder before and after intercourse  --continue taking 1 probiotic pill (any with lactobacillus and/or acidophilus) daily  --retroperitoneal ultrasound normal  --cystoscopy normal  but with moderate trabeculations               ---with PV urgency, concern for OAB              6. Microscopic hematuria  --retroperitoneal ultrasound normal  --cystoscopy normal today but with moderate trabeculations               ---with PV urgency, concern for OAB      7.desires to stop menses with travel  --will start ocp's with [facility]  --call when you start your cycle    8. RTC 6 months for follow up      I spent a total of 30 minutes on the day of the visit.  This includes face to face time and non-face to face time preparing to see the patient (eg, review of tests), obtaining and/or reviewing separately obtained history, documenting clinical information in the electronic or other health record, independently interpreting results and communicating results to the patient/family/caregiver, or care coordinator.    Анна Moreno, DAXA-BC [facility]  Division of Female Pelvic Medicine and Reconstructive Surgery  Department of Obstetrics & Gynecology

## 2023-01-26 ENCOUNTER — PATIENT MESSAGE (OUTPATIENT)
Dept: UROGYNECOLOGY | Facility: CLINIC | Age: 38
End: 2023-01-26
Payer: MEDICAID

## 2023-01-27 ENCOUNTER — PATIENT MESSAGE (OUTPATIENT)
Dept: UROGYNECOLOGY | Facility: CLINIC | Age: 38
End: 2023-01-27
Payer: MEDICAID

## 2023-01-27 ENCOUNTER — TELEPHONE (OUTPATIENT)
Dept: UROGYNECOLOGY | Facility: CLINIC | Age: 38
End: 2023-01-27
Payer: MEDICAID

## 2023-01-27 DIAGNOSIS — N30.00 ACUTE CYSTITIS WITHOUT HEMATURIA: Primary | ICD-10-CM

## 2023-01-27 RX ORDER — CIPROFLOXACIN 500 MG/1
500 TABLET ORAL 2 TIMES DAILY
Qty: 10 TABLET | Refills: 0 | Status: SHIPPED | OUTPATIENT
Start: 2023-01-27 | End: 2023-02-01

## 2023-01-27 NOTE — TELEPHONE ENCOUNTER
----- Message from Cristina Ross sent at 1/27/2023 10:48 AM CST -----  Pt is calling about the medication you gave her.. Pt can be reached at [phone]

## 2023-01-28 LAB — BACTERIA UR CULT: ABNORMAL

## 2023-01-30 ENCOUNTER — PATIENT MESSAGE (OUTPATIENT)
Dept: UROGYNECOLOGY | Facility: CLINIC | Age: 38
End: 2023-01-30
Payer: MEDICAID

## 2023-03-07 ENCOUNTER — HOSPITAL ENCOUNTER (EMERGENCY)
Facility: HOSPITAL | Age: 38
Discharge: HOME | End: 2023-03-07
Attending: STUDENT IN AN ORGANIZED HEALTH CARE EDUCATION/TRAINING PROGRAM
Payer: MEDICAID

## 2023-03-07 VITALS
OXYGEN SATURATION: 99 % | DIASTOLIC BLOOD PRESSURE: 58 MMHG | HEART RATE: 97 BPM | TEMPERATURE: 99 F | SYSTOLIC BLOOD PRESSURE: 98 MMHG

## 2023-03-07 DIAGNOSIS — S01.81XA FOREHEAD LACERATION, INITIAL ENCOUNTER: ICD-10-CM

## 2023-03-07 DIAGNOSIS — S09.90XA INJURY OF HEAD, INITIAL ENCOUNTER: Primary | ICD-10-CM

## 2023-03-07 PROCEDURE — 12013 RPR F/E/E/N/L/M 2.6-5.0 CM: CPT

## 2023-03-07 PROCEDURE — 99284 EMERGENCY DEPT VISIT MOD MDM: CPT | Mod: 25

## 2023-03-08 NOTE — ED PROVIDER NOTES
Encounter Date: 3/7/2023       History     Chief Complaint   Patient presents with    Fall     Laceration to the forehead above right eyebrow after fall from standing. -LOC -blood thinners.      37-year-old female with history of frequent falls related to her cerebral palsy presents for evaluation after a fall.  She was not using her walker at the time and fell hitting her head on the beds headboard.  Laceration over right eyebrow.  At time of presentation to myself, patient is denying any other injuries, pains, complaints.    The history is provided by the patient.   Review of patient's allergies indicates:  No Known Allergies  Past Medical History:   Diagnosis Date    Cerebral palsy     Gait abnormality     Low blood pressure      Past Surgical History:   Procedure Laterality Date     SECTION      LAPAROSCOPIC SALPINGECTOMY Bilateral 7/10/2020    Procedure: SALPINGECTOMY, LAPAROSCOPIC;  Surgeon: Rosa Mcclendon MD;  Location: [facility];  Service: OB/GYN;  Laterality: Bilateral;  video confirmed  CW     No family history on file.  Social History     Tobacco Use    Smoking status: Never    Smokeless tobacco: Never   Substance Use Topics    Alcohol use: No    Drug use: No     Review of Systems   All other systems reviewed and are negative.    Physical Exam     Initial Vitals [23 1859]   BP Pulse Resp Temp SpO2   (!) 98/58 97 -- 98.7 °F (37.1 °C) 99 %      MAP       --         Physical Exam    Nursing note and vitals reviewed.  Constitutional: She appears well-developed and well-nourished.   HENT:   Head: Normocephalic. Head is with laceration.       Right Ear: External ear normal.   Left Ear: External ear normal.   Nose: Nose normal.   Eyes: Conjunctivae and EOM are normal. Pupils are equal, round, and reactive to light.   Neck: No tracheal deviation present.   Normal range of motion.  Cardiovascular:  Normal rate, regular rhythm and intact distal pulses.           Pulmonary/Chest: No respiratory  distress. She has no wheezes.   Musculoskeletal:         General: No edema. Normal range of motion.      Cervical back: Normal range of motion.     Neurological: She is alert and oriented to person, place, and time. She has normal strength. No cranial nerve deficit or sensory deficit. Gait normal.   Skin: Skin is warm and dry.   Psychiatric: She has a normal mood and affect. Her behavior is normal. Thought content normal.       ED Course   Lac Repair    Date/Time: 3/7/2023 9:28 PM  Performed by: Amos Choi MD  Authorized by: Amos Choi MD     Consent:     Consent obtained:  Verbal    Consent given by:  Parent    Risks, benefits, and alternatives were discussed: yes      Risks discussed:  Need for additional repair, infection and pain    Alternatives discussed:  No treatment and observation  Universal protocol:     Procedure explained and questions answered to patient or proxy's satisfaction: yes      Patient identity confirmed:  Verbally with patient and hospital-assigned identification number  Anesthesia:     Anesthesia method:  None  Laceration details:     Length (cm):  4  Exploration:     Limited defect created (wound extended): no      Hemostasis achieved with:  Direct pressure    Wound exploration: wound explored through full range of motion and entire depth of wound visualized      Contaminated: no    Treatment:     Area cleansed with:  Saline    Amount of cleaning:  Standard    Irrigation solution:  Sterile saline    Irrigation method:  Pressure wash    Debridement:  None    Undermining:  None  Skin repair:     Repair method:  Tissue adhesive  Approximation:     Approximation:  Close  Repair type:     Repair type:  Simple  Post-procedure details:     Dressing:  Open (no dressing)    Procedure completion:  Tolerated well, no immediate complications  Labs Reviewed - No data to display       Imaging Results              CT Head Without Contrast (Final result)  Result time 03/07/23 21:37:56      Final  result by Rock oJrdan DO (03/07/23 21:37:56)                   Impression:      No acute intracranial abnormality.      Electronically signed by: Rock Jordan  Date:    03/07/2023  Time:    21:37               Narrative:    EXAMINATION:  CT HEAD WITHOUT CONTRAST    CLINICAL HISTORY:  Head trauma, moderate-severe;    TECHNIQUE:  Low dose axial CT images obtained throughout the head without intravenous contrast. Sagittal and coronal reconstructions were performed.    COMPARISON:  CT head from 08/13/2021.    FINDINGS:  Ventricles and sulci are normal in size for age without evidence of hydrocephalus. No extra-axial blood or fluid collections.  The brain parenchyma is normal. No parenchymal mass, hemorrhage, edema or major vascular distribution infarct.    No calvarial fracture.  The scalp is unremarkable.  Bilateral paranasal sinuses and mastoid air cells are clear.                                       Medications - No data to display  Medical Decision Making:   History:   Old Records Summarized: other records.       <> Summary of Records: Urogynecology, otolaryngology, previous ED visits  Differential Diagnosis:   Skull fracture, ICH, concussion, facial laceration  Clinical Tests:   Radiological Study: Ordered and Reviewed  ED Management:  37-year-old female presenting for evaluation after a fall.  Laceration repaired as described above.  Patient declined lab testing that was ordered from triage.  Patient will follow up with primary care.  Return ED precautions given for worsening or changing symptoms.           ED Course as of 03/08/23 0350   Tue Mar 07, 2023   2126 Patient declining labwork and does not wish to wait on results of CT ordered from teletriage. She is happy with the laceration repair and would like to be discharged. Will call patient back if remarkable findings on CT. [KB]      ED Course User Index  [KB] Amos Choi MD                 Clinical Impression:   Final diagnoses:  [S09.90XA] Injury  of head, initial encounter (Primary)  [S01.81XA] Forehead laceration, initial encounter        ED Disposition Condition    Discharge Stable          ED Prescriptions    None       Follow-up Information       Follow up With Specialties Details Why Contact Info    Ivett Cardoso MD Internal Medicine Schedule an appointment as soon as possible for a visit in 3 days  [address] [facility] [address]  [phone]      [facility] Emergency Dept Emergency Medicine Go to  As needed, If symptoms worsen [address] [facility] [address] [phone]             Amos Choi MD  03/08/23 0352

## 2023-03-08 NOTE — FIRST PROVIDER EVALUATION
Emergency Department TeleTriage Encounter Note      CHIEF COMPLAINT    Chief Complaint   Patient presents with    Fall     Laceration to the forehead above right eyebrow after fall from standing. -LOC -blood thinners.        VITAL SIGNS   Initial Vitals [03/07/23 1859]   BP Pulse Resp Temp SpO2   (!) 98/58 97 -- 98.7 °F (37.1 °C) 99 %      MAP       --            ALLERGIES    Review of patient's allergies indicates:  No Known Allergies    PROVIDER TRIAGE NOTE  Patient presents with complaint of fall PTA to ED. She reportedly has a history of CP. She is unable to tell me how she fell. She denied further injury.       Phy:   Constitutional: well nourished, well developed, appearing stated age, NAD   HEENT: NCAT, symmetrical lids, No obvious facial deformity.  Normal phonation. Normal Conjunctiva   Neck: NAROM   Respiratory: Normal effort.  No obvious use of accessory muscles   Neuro: Alert,   Psych: appropriate mood and affect      Initial orders will be placed and care will be transferred to an alternate provider when patient is roomed for a full evaluation. Any additional orders and the final disposition will be determined by that provider.        ORDERS  Labs Reviewed   CBC W/ AUTO DIFFERENTIAL   COMPREHENSIVE METABOLIC PANEL       ED Orders (720h ago, onward)      Start Ordered     Status Ordering Provider    03/07/23 1915 03/07/23 1914  CBC auto differential  STAT         Ordered MORGAN RIVERA    03/07/23 1915 03/07/23 1914  Comprehensive metabolic panel  STAT         Ordered MORGAN RIVERA    03/07/23 1914 03/07/23 1914  CT Head Without Contrast  1 time imaging         Ordered MORGAN RIVERA              Virtual Visit Note: The provider triage portion of this emergency department evaluation and documentation was performed via [employer], a HIPAA-compliant telemedicine application, in concert with a tele-presenter in the room. A face to face patient evaluation with one  of my colleagues will occur once the patient is placed in an emergency department room.      DISCLAIMER: This note was prepared with [employer] voice recognition transcription software. Garbled syntax, mangled pronouns, and other bizarre constructions may be attributed to that software system.

## 2023-03-20 ENCOUNTER — TELEPHONE (OUTPATIENT)
Dept: INTERNAL MEDICINE | Facility: CLINIC | Age: 38
End: 2023-03-20
Payer: MEDICAID

## 2023-03-20 ENCOUNTER — NURSE TRIAGE (OUTPATIENT)
Dept: ADMINISTRATIVE | Facility: CLINIC | Age: 38
End: 2023-03-20
Payer: MEDICAID

## 2023-03-20 ENCOUNTER — HOSPITAL ENCOUNTER (EMERGENCY)
Facility: HOSPITAL | Age: 38
Discharge: HOME | End: 2023-03-20
Attending: EMERGENCY MEDICINE
Payer: MEDICAID

## 2023-03-20 ENCOUNTER — TELEPHONE (OUTPATIENT)
Dept: ORTHOPEDICS | Facility: CLINIC | Age: 38
End: 2023-03-20
Payer: MEDICAID

## 2023-03-20 VITALS
DIASTOLIC BLOOD PRESSURE: 65 MMHG | OXYGEN SATURATION: 100 % | BODY MASS INDEX: 20.37 KG/M2 | HEART RATE: 76 BPM | TEMPERATURE: 98 F | SYSTOLIC BLOOD PRESSURE: 114 MMHG | WEIGHT: 115 LBS | RESPIRATION RATE: 18 BRPM

## 2023-03-20 DIAGNOSIS — S59.902A ELBOW INJURY, LEFT, INITIAL ENCOUNTER: Primary | ICD-10-CM

## 2023-03-20 PROCEDURE — 99283 EMERGENCY DEPT VISIT LOW MDM: CPT

## 2023-03-20 NOTE — TELEPHONE ENCOUNTER
Patient fell on 3/7. She was seen in the ER for a head laceration. Patient states that the next day she noticed swelling and pain to her left elbow. Advised per protocol to be seen in the office today if possible. Patient would like an appointment with orthopedics. Will transfer to the appointment desk. Advised the patient to call back with any further questions or if symptoms worsen.     Reason for Disposition   SEVERE pain (e.g., excruciating, unable to do any normal activities)    Additional Information   Negative: Shock suspected (e.g., cold/pale/clammy skin, too weak to stand, low BP, rapid pulse)   Negative: Similar pain previously and it was from 'heart attack'   Negative: Similar pain previously and it was from 'angina', and not relieved by nitroglycerin   Negative: Sounds like a life-threatening emergency to the triager   Negative: Difficulty breathing or unusual sweating (e.g., sweating without exertion)   Negative: Age > 40 and associated chest or jaw pain, and pain lasting > 5 minutes   Negative: Red area or streak and fever   Negative: Swollen joint and fever   Negative: Entire arm is swollen   Negative: Patient sounds very sick or weak to the triager    Protocols used: Elbow Pain-A-[place]

## 2023-03-20 NOTE — TELEPHONE ENCOUNTER
Spoke with patient. Informed patient that Dr Mac is currently not taking new patient Medicaid in [place] at this time.

## 2023-03-20 NOTE — TELEPHONE ENCOUNTER
No available appointments and times that the patient was able to accept. 1 st available wasn't until Thursday.   The patient could not accept that time due to needing a ride.  The patient will go through ER.

## 2023-03-20 NOTE — TELEPHONE ENCOUNTER
Patient fell on 3/7. She was seen in the ER for a head laceration. Patient states that the next day she noticed swelling and pain to her left elbow.    I spoke to the patient and she states that her left elbow is very painful and has swelling. Requesting Ortho referral.  Please advise.

## 2023-03-20 NOTE — TELEPHONE ENCOUNTER
----- Message from Nikkie Cleveland sent at 3/20/2023 10:33 AM CDT -----  Type:  Needs Medical Advice    Who Called: pt  Symptoms (please be specific): pt  fell went to the ER on 3/7 was sent home feels elbow is very swallen and looks broken to her  Would the patient rather a call back or a response via [employer]? call  Best Call Back Number: [phone]   Additional Information: spoke to nurse triage

## 2023-03-20 NOTE — TELEPHONE ENCOUNTER
----- Message from Princess Vidal sent at 3/20/2023  1:08 PM CDT -----  Contact: Pt @[phone]  Patient would like to get a referral.    Referral to what specialty:    Orthopedic     Does the patient want the referral with a specific physician:    Is the specialist an [facility]:      Reason (be specific):    Left elbow is swollen     Does the patient already have the specialty clinic appointment scheduled:  No  If yes, what date is the appointment scheduled:       Is the insurance listed in Epic correct? (this is important for a referral):  Medicaid     Advised patient that once provider approves this either a nurse or  will return their call?: Yes     Would the patient like a call back, or a response through their [facility] portal?:   call back     Comments:   Please call back to advise or portal message to advise.

## 2023-03-21 ENCOUNTER — TELEPHONE (OUTPATIENT)
Dept: INTERNAL MEDICINE | Facility: CLINIC | Age: 38
End: 2023-03-21
Payer: MEDICAID

## 2023-03-21 NOTE — DISCHARGE INSTRUCTIONS
Thank you for coming in to see us at [facility]! It was nice to meet you, and I hope you feel better soon. Please feel free to return to the ER at any time should your symptoms get worse, or if you have different emergent concerns.    Our goal in the emergency department is to always give you outstanding care and exceptional service. You may receive a survey by mail or e-mail in the next week regarding your experience in our ED. We would greatly appreciate your completing and returning the survey. Your feedback provides us with a way to recognize our staff who give very good care and it helps us learn how to improve when your experience was below our aspiration of excellence.       Sincerely,    Francesco Godinez MD  Medical Director  Emergency Department  [facility]

## 2023-03-21 NOTE — TELEPHONE ENCOUNTER
REQUESTING ORDERS FOR HOME HEALTH PHYSICAL THERAPY TO EVALUATE AND TREAT FOR BALANCE, RANGE OF MOTION.    Please order.

## 2023-03-21 NOTE — ED NOTES
Patient reports one week of left elbow pain increasing over time.  Reports falling last week.  ENRIQUEZ well.  Swelling noted to left elbow region.  Ice pack applied.  Patient has been using splint for comfort.      Pain:  Rated 8/10.     Psychosocial:  Patient is calm and cooperative.  Patients insight and judgement are appropriate to situation.  Appears clean, well maintained, with clothing appropriate to environment.  No evidence of delusions, hallucinations, or psychosis.     Neuro:  Eyes open spontaneously.  Awake, alert, oriented x 4.  Speech clear and appropriate.  Tolerating saliva secretions well.  Able to follow commands, demonstrating ability to actively and appropriately communicate within context of current conversation.  Symmetrical facial muscles.  Moving all extremities well with no noted weakness.  Adequate muscle tone present.    Movement is purposeful.        Airway:  Bilateral chest rise and fall.  RR regular and non-labored.       Circulatory:  Skin warm, dry, and pink.  Left radial pulses strong and regular.  Capillary refill/skin blanching less than 3 seconds to distal of upper extremities.       Extremities:  No redness, heat, swelling, deformity, or pain.     Skin:  Intact with no bruising/discolorations noted.

## 2023-03-23 DIAGNOSIS — Z78.9 IMPAIRED MOBILITY AND ACTIVITIES OF DAILY LIVING: ICD-10-CM

## 2023-03-23 DIAGNOSIS — Z74.09 IMPAIRED MOBILITY AND ACTIVITIES OF DAILY LIVING: ICD-10-CM

## 2023-03-23 DIAGNOSIS — G80.9 CEREBRAL PALSY, UNSPECIFIED TYPE: Primary | ICD-10-CM

## 2023-03-28 PROCEDURE — G0180 PR HOME HEALTH MD CERTIFICATION: ICD-10-PCS | Mod: ,,, | Performed by: HOSPITALIST

## 2023-03-28 PROCEDURE — G0180 MD CERTIFICATION HHA PATIENT: HCPCS | Mod: ,,, | Performed by: HOSPITALIST

## 2023-04-05 ENCOUNTER — OFFICE VISIT (OUTPATIENT)
Dept: UROGYNECOLOGY | Facility: CLINIC | Age: 38
End: 2023-04-05
Payer: MEDICAID

## 2023-04-05 ENCOUNTER — TELEPHONE (OUTPATIENT)
Dept: UROLOGY | Facility: CLINIC | Age: 38
End: 2023-04-05
Payer: MEDICAID

## 2023-04-05 ENCOUNTER — TELEPHONE (OUTPATIENT)
Dept: INTERNAL MEDICINE | Facility: CLINIC | Age: 38
End: 2023-04-05
Payer: MEDICAID

## 2023-04-05 VITALS — SYSTOLIC BLOOD PRESSURE: 122 MMHG | DIASTOLIC BLOOD PRESSURE: 80 MMHG | BODY MASS INDEX: 20.37 KG/M2 | HEIGHT: 63 IN

## 2023-04-05 DIAGNOSIS — N39.46 MIXED STRESS AND URGE URINARY INCONTINENCE: ICD-10-CM

## 2023-04-05 DIAGNOSIS — K59.00 CONSTIPATION, UNSPECIFIED CONSTIPATION TYPE: ICD-10-CM

## 2023-04-05 DIAGNOSIS — R39.11 URINARY HESITANCY: ICD-10-CM

## 2023-04-05 DIAGNOSIS — R33.9 INCOMPLETE BLADDER EMPTYING: Primary | ICD-10-CM

## 2023-04-05 PROCEDURE — 99214 OFFICE O/P EST MOD 30 MIN: CPT | Mod: PBBFAC | Performed by: NURSE PRACTITIONER

## 2023-04-05 PROCEDURE — 3008F PR BODY MASS INDEX (BMI) DOCUMENTED: ICD-10-PCS | Mod: CPTII,,, | Performed by: NURSE PRACTITIONER

## 2023-04-05 PROCEDURE — 3074F PR MOST RECENT SYSTOLIC BLOOD PRESSURE < 130 MM HG: ICD-10-PCS | Mod: CPTII,,, | Performed by: NURSE PRACTITIONER

## 2023-04-05 PROCEDURE — 99999 PR PBB SHADOW E&M-EST. PATIENT-LVL IV: CPT | Mod: PBBFAC,,, | Performed by: NURSE PRACTITIONER

## 2023-04-05 PROCEDURE — 3008F BODY MASS INDEX DOCD: CPT | Mod: CPTII,,, | Performed by: NURSE PRACTITIONER

## 2023-04-05 PROCEDURE — 99999 PR PBB SHADOW E&M-EST. PATIENT-LVL IV: ICD-10-PCS | Mod: PBBFAC,,, | Performed by: NURSE PRACTITIONER

## 2023-04-05 PROCEDURE — 99214 PR OFFICE/OUTPT VISIT, EST, LEVL IV, 30-39 MIN: ICD-10-PCS | Mod: 25,S$PBB,, | Performed by: NURSE PRACTITIONER

## 2023-04-05 PROCEDURE — 1159F PR MEDICATION LIST DOCUMENTED IN MEDICAL RECORD: ICD-10-PCS | Mod: CPTII,,, | Performed by: NURSE PRACTITIONER

## 2023-04-05 PROCEDURE — 3079F DIAST BP 80-89 MM HG: CPT | Mod: CPTII,,, | Performed by: NURSE PRACTITIONER

## 2023-04-05 PROCEDURE — 3074F SYST BP LT 130 MM HG: CPT | Mod: CPTII,,, | Performed by: NURSE PRACTITIONER

## 2023-04-05 PROCEDURE — 3079F PR MOST RECENT DIASTOLIC BLOOD PRESSURE 80-89 MM HG: ICD-10-PCS | Mod: CPTII,,, | Performed by: NURSE PRACTITIONER

## 2023-04-05 PROCEDURE — 99214 OFFICE O/P EST MOD 30 MIN: CPT | Mod: 25,S$PBB,, | Performed by: NURSE PRACTITIONER

## 2023-04-05 PROCEDURE — 1160F RVW MEDS BY RX/DR IN RCRD: CPT | Mod: CPTII,,, | Performed by: NURSE PRACTITIONER

## 2023-04-05 PROCEDURE — 1159F MED LIST DOCD IN RCRD: CPT | Mod: CPTII,,, | Performed by: NURSE PRACTITIONER

## 2023-04-05 PROCEDURE — 87086 URINE CULTURE/COLONY COUNT: CPT | Performed by: NURSE PRACTITIONER

## 2023-04-05 PROCEDURE — 1160F PR REVIEW ALL MEDS BY PRESCRIBER/CLIN PHARMACIST DOCUMENTED: ICD-10-PCS | Mod: CPTII,,, | Performed by: NURSE PRACTITIONER

## 2023-04-05 NOTE — TELEPHONE ENCOUNTER
----- Message from Izabela Ruiz sent at 4/5/2023 10:54 AM CDT -----  Contact: self/[phone]  Caller is requesting an earlier appointment then we can schedule.  Caller is requesting a message be sent to the provider.  If this is for urgent care symptoms, did you offer other providers at this location, providers at other locations, or [facility]? (yes, no, n/a):    If this is for the patients physical, did you offer to schedule next available and put on wait list, or to see NP or PA for their physical?  (yes, no, n/a):    When is the next available appointment with their provider: no time came up   Reason for the appointment:  pt has swollen feet  Patient preference of timeframe to be scheduled:  ASAP  Would the patient like a call back, or a response through their [facility] portal?:   call back  Comments:       Please advice

## 2023-04-05 NOTE — PROGRESS NOTES
Urogyn follow up  04/05/2023  .  [facility]  [address]  [facility] [address]    Medina Hernandez  [id number]  1985      Medina Hernandez is a 37 y.o.  here for a urogyn follow up for urge incontinence.    Last HPI from 09/24/2021  1)  Mixed urinary incontinence, urge > stress:    --+ urinary urgency  --not leaking urine     2. Constipation:  --taking fiber supplement.     3. Pelvic floor weakness  --has not started pelvic floor PT        4. Vulvar atrophy-- concerns for lichen  --using clobetasol and estrogen cream     5. Frequent UTIs (bladder infections):  --no current symptoms     6.microscopic hematuria  --6 rbc in cath specimen    02/09/2022  1)  Mixed urinary incontinence, urge > stress:    --+ urinary urgency/ UUI  --taking vesicare 5 mg daily--feels like she can not initiate void sometime  --voiding every hour during the day  --nocturia 2/ night--does not limit fluids  --1-2 pullups/ day  --minimally wet for the most part--occasionally soaked      2. Constipation:  --improved  --taking fiber supplement.     3. Pelvic floor weakness  --had eval  --needs to schedule follow up visit        4. Vulvar atrophy-- concerns for lichen  --using clobetasol and estrogen cream--has not been using      5. Frequent UTIs (bladder infections):  --no current symptoms     6.microscopic hematuria  --normal cysto    01/25/2023     1. Mixed urinary incontinence, urge > stress with post-void urgency:  --reports having trouble urinating occasionally--occurs 1-2 times weekly  --denies urinary urgency  --taking  continue vesicare 5 mg daily.  --gemtessa was not covered .      2. Constipation:  --intermittent     3. Pelvic floor weakness/tension  --getting ready to start PT     4. Vulvar atrophy-- concerns for lichen  --no longer using clobetasol     5. Frequent UTIs (bladder infections):  --denies dysuria at this time  --taking cranberry pills  --8/2021 urine C&S (clean cath) + staph aureus (may have been skin  contaminant)  --continue taking 1 probiotic pill (any with lactobacillus and/or acidophilus) daily  --retroperitoneal ultrasound normal   --cystoscopy normal  but with moderate trabeculations               ---with PV urgency, concern for OAB              6. Microscopic hematuria  --retroperitoneal ultrasound normal  --cystoscopy normal  but with moderate trabeculations               ---with PV urgency, concern for OAB     Changes since last visit:  Treated for uti last in 2023  Called today complaining of inability to urinate. Reports this has happened a few times this week.  Has not been able to attend pelvic floor PT  Abdomen appears distended         Past Medical History:   Diagnosis Date    Cerebral palsy     Gait abnormality     Low blood pressure        Past Surgical History:   Procedure Laterality Date     SECTION      LAPAROSCOPIC SALPINGECTOMY Bilateral 7/10/2020    Procedure: SALPINGECTOMY, LAPAROSCOPIC;  Surgeon: Rosa Mcclendon MD;  Location: [facility] OR;  Service: OB/GYN;  Laterality: Bilateral;  video confirmed  CW       History reviewed. No pertinent family history.    Social History     Socioeconomic History    Marital status:     Number of children: 1   Tobacco Use    Smoking status: Never    Smokeless tobacco: Never   Substance and Sexual Activity    Alcohol use: No    Drug use: No    Sexual activity: Yes     Partners: Male     Birth control/protection: Condom, Implant       Current Outpatient Medications   Medication Sig Dispense Refill    acetaminophen (TYLENOL) 500 MG tablet Take 1-2 tablets (500-1,000 mg total) by mouth 3 (three) times daily as needed for Pain.  0    AFLURIA QD -,3YR UP,,PF, 60 mcg (15 mcg x 4)/0.5 mL Syrg ADM 0.5ML IM UTD      baclofen (LIORESAL) 20 MG tablet Take 1 tablet (20 mg total) by mouth 3 (three) times daily. 90 tablet 2    calcium carbonate (CALTRATE 600 ORAL) Take by mouth once daily.      clobetasol 0.05% (TEMOVATE) 0.05 % Oint Apply  "topically once daily. 30 g 3    estradioL (ESTRACE) 0.01 % (0.1 mg/gram) vaginal cream Place 1 g vaginally once daily. 42.5 g 3    ibuprofen (ADVIL,MOTRIN) 600 MG tablet Take 1 tablet (600 mg total) by mouth every 6 (six) hours as needed for Pain (pain). 30 tablet 1    lidocaine HCL 2% (XYLOCAINE) 2 % jelly Apply topically 3 (three) times daily. Apply to affected area three times daily prn 30 mL 1    traMADoL (ULTRAM) 50 mg tablet Take 1 tablet (50 mg total) by mouth 3 (three) times daily as needed for Pain. 90 tablet 0    gabapentin (NEURONTIN) 300 MG capsule Take 1 capsule (300 mg total) by mouth As instructed (Take one capsule every morning and after noon, and two at bedtime (4 capsules total daily)). 120 capsule 3    solifenacin (VESICARE) 5 MG tablet Take 1 tablet (5 mg total) by mouth once daily. 30 tablet 11     No current facility-administered medications for this visit.       Review of patient's allergies indicates:  No Known Allergies    Well woman:  Pap test: 09/2021 normal HPV negative History of abnormal paps: No.  History of STIs:  No  Mammogram: n/a  Colonoscopy n/a  DEXA:  n/a    ROS:  As per HPI.      Exam  /80 (BP Location: Left arm, Patient Position: Sitting, BP Method: Medium (Manual))   Ht 5' 3" (1.6 m)   BMI 20.37 kg/m²   General: alert and oriented, no acute distress  Respiratory: normal respiratory effort  Abd: soft, non-tender, distended-- bladder palpable    Pelvic  Ext. Genitalia: normal external genitalia. Normal bartholin's and skeens glands  Vagina: + atrophy. Normal vaginal mucosa without lesions. No discharge noted.   Non-tender bladder base without palpable mass. +TTP in bilateral levator ani  Cervix: no lesions  Uterus:  uterus is normal size, shape, consistency and nontender   Urethra: no masses or tenderness  Urethral meatus: no lesions, caruncle or prolapse.  Rectum : normal-- no stool palpated    Sterile catheter inserted-- 600 cc clear yellow urine obtained-- catheter " withdrawn  #16 fr catheter inserted-- 1100 mL clear yellow urine received-- clamping once inbetween drainage    Impression  1. Incomplete bladder emptying  US Retroperitoneal Complete    Comprehensive Metabolic Panel    Ambulatory referral/consult to Urology      2. Urinary hesitancy  Urine culture      3. Constipation, unspecified constipation type        4. Mixed stress and urge urinary incontinence            We reviewed the above issues and discussed options for short-term versus long-term management of her problems.   Plan:      1. Mixed urinary incontinence, urge > stress with post-void urgency:  --urine C&S today  --1500 ML obtained with joseph   --URGE: STOP vesicare 5 mg daily. Gemtessa was not covered  For dry mouth: get sour, sugar free lozenge or gum.    --STRESS:  Pessary vs. Sling.      2. Constipation:  --continue fiber supplement     3. Pelvic floor weakness/TENSION  --use vaginal valium twice weekly    4. Vulvar atrophy-- concerns for lichen  --twice weekly AM apply steroid ointment/cream:  Apply dime-sized amount with finger to vaginal opening, inner lips, and all external areas (including around anus) that are irritated.  DO NOT APPLY INTERNALLY.   --twice weekly PM apply estrogen cream.  Apply dime-sized amount with finger to vaginal opening, inner lips, and all external areas (including around anus) that are irritated. Also apply small amount inside vagina with finger (insert to knuckle).     5. Frequent UTIs (bladder infections):  --urine C&S  --8/2021 urine C&S (clean cath) + staph aureus (may have been skin contaminant)  --If you feel like you have a UTI, please call our office so that we can place an order for you to drop off a urine specimen at the closest [facility] lab (we will arrange).                --We will call in antibiotics for you to start right after you drop off specimen.                --In this way, we can determine:                           1)  Do you have a UTI?                             2) If you have a UTI, is it sensitive to the antibiotics we prescribed?   --follow UTI prevention tips (see attached)  --control bowel movements/fecal cross-contamination  --change your liner pad every time you pull your pants on  --empty bladder before and after intercourse  --continue taking 1 probiotic pill (any with lactobacillus and/or acidophilus) daily  --retroperitoneal ultrasound normal  --cystoscopy normal  but with moderate trabeculations               ---with PV urgency, concern for OAB              6. Microscopic hematuria  --retroperitoneal ultrasound normal  --cystoscopy normal today but with moderate trabeculations               ---with PV urgency, concern for OAB      7. concern for neurogenic bladder  --joseph inserted today  --urology referral for Dr. King or Dr. Davalos  --retroperitoneal ultrasound  --cmp    8. RTC tomorrow to learn how to self cath  --retroperitoneal ultrasound in AM  --get blood work prior to coming upstairs at [facility]      I spent a total of 30 minutes on the day of the visit.  This includes face to face time and non-face to face time preparing to see the patient (eg, review of tests), obtaining and/or reviewing separately obtained history, documenting clinical information in the electronic or other health record, independently interpreting results and communicating results to the patient/family/caregiver, or care coordinator.    Анна Moreno, DAXA-BC  [facility]  Division of Female Pelvic Medicine and Reconstructive Surgery  Department of Obstetrics & Gynecology

## 2023-04-05 NOTE — TELEPHONE ENCOUNTER
Patient reports being unable to urinate since this morning. She reports it was difficult to void this morning. Message sent to Анна for appt vs ER.    ----- Message from Almaz Donald sent at 4/5/2023 12:17 PM CDT -----  Type:  Patient Returning Call    Who Called:     Who Left Message for Patient:     Does the patient know what this is regarding?: missed call     Best Call Back Number:  [phone]    Additional Information:

## 2023-04-05 NOTE — TELEPHONE ENCOUNTER
LVM.    ----- Message from Loretta Braden sent at 4/5/2023 11:59 AM CDT -----  Regarding: sooner appt  Name of caller: lance       What is the requesting detail: pt has an appt on 5-24 but is requesting to be seen today or tomorrow. Please give her a call back to let her know what her options are.       Can the clinic reply by [facility]: yes       What number to call back:[phone]

## 2023-04-05 NOTE — PATIENT INSTRUCTIONS
1. Mixed urinary incontinence, urge > stress with post-void urgency:  --urine C&S today  --1500 ML obtained with joseph   --URGE: STOP vesicare 5 mg daily. [place] was not covered  For dry mouth: get sour, sugar free lozenge or gum.    --STRESS:  Pessary vs. Sling.      2. Constipation:  --continue fiber supplement     3. Pelvic floor weakness/TENSION  --use vaginal valium twice weekly    4. Vulvar atrophy-- concerns for lichen  --twice weekly AM apply steroid ointment/cream:  Apply dime-sized amount with finger to vaginal opening, inner lips, and all external areas (including around anus) that are irritated.  DO NOT APPLY INTERNALLY.   --twice weekly PM apply estrogen cream.  Apply dime-sized amount with finger to vaginal opening, inner lips, and all external areas (including around anus) that are irritated. Also apply small amount inside vagina with finger (insert to knuckle).     5. Frequent UTIs (bladder infections):  --urine C&S  --8/2021 urine C&S (clean cath) + staph aureus (may have been skin contaminant)  --If you feel like you have a UTI, please call our office so that we can place an order for you to drop off a urine specimen at the closest [facility] lab (we will arrange).                --We will call in antibiotics for you to start right after you drop off specimen.                --In this way, we can determine:                           1)  Do you have a UTI?                            2) If you have a UTI, is it sensitive to the antibiotics we prescribed?   --follow UTI prevention tips (see attached)  --control bowel movements/fecal cross-contamination  --change your liner pad every time you pull your pants on  --empty bladder before and after intercourse  --continue taking 1 probiotic pill (any with lactobacillus and/or acidophilus) daily  --retroperitoneal ultrasound normal  --cystoscopy normal  but with moderate trabeculations               ---with PV urgency, concern for OAB              6.  Microscopic hematuria  --retroperitoneal ultrasound normal  --cystoscopy normal today but with moderate trabeculations               ---with PV urgency, concern for OAB      7. concern for neurogenic bladder  --joseph inserted today  --urology referral for Dr. King or Dr. Davalos  --retroperitoneal ultrasound  --cmp    8. RTC tomorrow to learn how to self cath  --[place] really [place]  --we really recommend for you to learn or someone else to learn how to catheterize you instead of using the joseph catheter for the next month  --retroperitoneal ultrasound in AM  --get blood work prior to coming upstairs at [facility]

## 2023-04-06 ENCOUNTER — OFFICE VISIT (OUTPATIENT)
Dept: UROGYNECOLOGY | Facility: CLINIC | Age: 38
End: 2023-04-06
Payer: MEDICAID

## 2023-04-06 ENCOUNTER — HOSPITAL ENCOUNTER (OUTPATIENT)
Dept: RADIOLOGY | Facility: HOSPITAL | Age: 38
Discharge: HOME | End: 2023-04-06
Attending: NURSE PRACTITIONER
Payer: MEDICAID

## 2023-04-06 ENCOUNTER — TELEPHONE (OUTPATIENT)
Dept: INTERNAL MEDICINE | Facility: CLINIC | Age: 38
End: 2023-04-06
Payer: MEDICAID

## 2023-04-06 VITALS
BODY MASS INDEX: 20.37 KG/M2 | DIASTOLIC BLOOD PRESSURE: 53 MMHG | HEIGHT: 63 IN | HEART RATE: 78 BPM | SYSTOLIC BLOOD PRESSURE: 97 MMHG

## 2023-04-06 DIAGNOSIS — M62.89 PELVIC FLOOR TENSION: ICD-10-CM

## 2023-04-06 DIAGNOSIS — Z87.19 HX OF CONSTIPATION: ICD-10-CM

## 2023-04-06 DIAGNOSIS — G80.9 CEREBRAL PALSY, UNSPECIFIED TYPE: ICD-10-CM

## 2023-04-06 DIAGNOSIS — R33.9 INCOMPLETE BLADDER EMPTYING: ICD-10-CM

## 2023-04-06 DIAGNOSIS — R33.9 INCOMPLETE BLADDER EMPTYING: Primary | ICD-10-CM

## 2023-04-06 LAB — BACTERIA UR CULT: NO GROWTH

## 2023-04-06 PROCEDURE — 76770 US EXAM ABDO BACK WALL COMP: CPT | Mod: TC

## 2023-04-06 PROCEDURE — 1160F RVW MEDS BY RX/DR IN RCRD: CPT | Mod: CPTII,,, | Performed by: NURSE PRACTITIONER

## 2023-04-06 PROCEDURE — 1159F PR MEDICATION LIST DOCUMENTED IN MEDICAL RECORD: ICD-10-PCS | Mod: CPTII,,, | Performed by: NURSE PRACTITIONER

## 2023-04-06 PROCEDURE — 99213 OFFICE O/P EST LOW 20 MIN: CPT | Mod: PBBFAC,25 | Performed by: NURSE PRACTITIONER

## 2023-04-06 PROCEDURE — 99499 NO LOS: ICD-10-PCS | Mod: S$PBB,,, | Performed by: NURSE PRACTITIONER

## 2023-04-06 PROCEDURE — 99499 UNLISTED E&M SERVICE: CPT | Mod: S$PBB,,, | Performed by: NURSE PRACTITIONER

## 2023-04-06 PROCEDURE — 76770 US EXAM ABDO BACK WALL COMP: CPT | Mod: 26,,, | Performed by: STUDENT IN AN ORGANIZED HEALTH CARE EDUCATION/TRAINING PROGRAM

## 2023-04-06 PROCEDURE — 1160F PR REVIEW ALL MEDS BY PRESCRIBER/CLIN PHARMACIST DOCUMENTED: ICD-10-PCS | Mod: CPTII,,, | Performed by: NURSE PRACTITIONER

## 2023-04-06 PROCEDURE — 99999 PR PBB SHADOW E&M-EST. PATIENT-LVL III: CPT | Mod: PBBFAC,,, | Performed by: NURSE PRACTITIONER

## 2023-04-06 PROCEDURE — 3078F DIAST BP <80 MM HG: CPT | Mod: CPTII,,, | Performed by: NURSE PRACTITIONER

## 2023-04-06 PROCEDURE — 3074F SYST BP LT 130 MM HG: CPT | Mod: CPTII,,, | Performed by: NURSE PRACTITIONER

## 2023-04-06 PROCEDURE — 3008F PR BODY MASS INDEX (BMI) DOCUMENTED: ICD-10-PCS | Mod: CPTII,,, | Performed by: NURSE PRACTITIONER

## 2023-04-06 PROCEDURE — 3078F PR MOST RECENT DIASTOLIC BLOOD PRESSURE < 80 MM HG: ICD-10-PCS | Mod: CPTII,,, | Performed by: NURSE PRACTITIONER

## 2023-04-06 PROCEDURE — 3008F BODY MASS INDEX DOCD: CPT | Mod: CPTII,,, | Performed by: NURSE PRACTITIONER

## 2023-04-06 PROCEDURE — 1159F MED LIST DOCD IN RCRD: CPT | Mod: CPTII,,, | Performed by: NURSE PRACTITIONER

## 2023-04-06 PROCEDURE — 76770 US RETROPERITONEAL COMPLETE: ICD-10-PCS | Mod: 26,,, | Performed by: STUDENT IN AN ORGANIZED HEALTH CARE EDUCATION/TRAINING PROGRAM

## 2023-04-06 PROCEDURE — 3074F PR MOST RECENT SYSTOLIC BLOOD PRESSURE < 130 MM HG: ICD-10-PCS | Mod: CPTII,,, | Performed by: NURSE PRACTITIONER

## 2023-04-06 PROCEDURE — 99999 PR PBB SHADOW E&M-EST. PATIENT-LVL III: ICD-10-PCS | Mod: PBBFAC,,, | Performed by: NURSE PRACTITIONER

## 2023-04-06 NOTE — PATIENT INSTRUCTIONS
1. Mixed urinary incontinence, urge > stress with post-void urgency:  --urine C&S pending  --URGE: STOP vesicare 5 mg daily. Gemtessa was not covered  For dry mouth: get sour, sugar free lozenge or gum.    --STRESS:  Pessary vs. Sling.  --try to void every 2-3 hours.  --if you have not been able to urinate, please do self catheterization in 4 hours  --if you are not urinating on your own, please perform self catheterization 4 times daily   --referral to urology     2. Constipation:  --continue fiber supplement     3. Pelvic floor weakness/TENSION  --use vaginal valium twice daily as needed    4. Vulvar atrophy-- concerns for lichen  --twice weekly AM apply steroid ointment/cream:  Apply dime-sized amount with finger to vaginal opening, inner lips, and all external areas (including around anus) that are irritated.  DO NOT APPLY INTERNALLY.   --twice weekly PM apply estrogen cream.  Apply dime-sized amount with finger to vaginal opening, inner lips, and all external areas (including around anus) that are irritated. Also apply small amount inside vagina with finger (insert to knuckle).     5. Frequent UTIs (bladder infections):  --urine C&S pending  --8/2021 urine C&S (clean cath) + staph aureus (may have been skin contaminant)  --If you feel like you have a UTI, please call our office so that we can place an order for you to drop off a urine specimen at the closest [facility] lab (we will arrange).                --We will call in antibiotics for you to start right after you drop off specimen.                --In this way, we can determine:                           1)  Do you have a UTI?                            2) If you have a UTI, is it sensitive to the antibiotics we prescribed?   --follow UTI prevention tips (see attached)  --control bowel movements/fecal cross-contamination  --change your liner pad every time you pull your pants on  --empty bladder before and after intercourse  --continue taking 1 probiotic pill  (any with lactobacillus and/or acidophilus) daily  --retroperitoneal ultrasound normal  --cystoscopy normal  but with moderate trabeculations               ---with PV urgency, concern for OAB              6. Microscopic hematuria  --retroperitoneal ultrasound normal  --cystoscopy normal today but with moderate trabeculations               ---with PV urgency, concern for OAB      7. concern for neurogenic bladder  --joseph inserted today  --urology referral for Dr. King or Dr. Davalos  --retroperitoneal ultrasound normal  --cmp --renal function normal    8. RTC in 2 months or sooner-- to see urology first

## 2023-04-06 NOTE — PROGRESS NOTES
Urogyn follow up  04/05/2023  .  [facility] - UROGYNECOLOGY  [address] [address]    Medina Hernandez  [id number]  1985      Medina Hernandez is a 37 y.o.  here for a urogyn follow up for urge incontinence.    Last HPI from 09/24/2021  1)  Mixed urinary incontinence, urge > stress:    --+ urinary urgency  --not leaking urine     2. Constipation:  --taking fiber supplement.     3. Pelvic floor weakness  --has not started pelvic floor PT        4. Vulvar atrophy-- concerns for lichen  --using clobetasol and estrogen cream     5. Frequent UTIs (bladder infections):  --no current symptoms     6.microscopic hematuria  --6 rbc in cath specimen    02/09/2022  1)  Mixed urinary incontinence, urge > stress:    --+ urinary urgency/ UUI  --taking vesicare 5 mg daily--feels like she can not initiate void sometime  --voiding every hour during the day  --nocturia 2/ night--does not limit fluids  --1-2 pullups/ day  --minimally wet for the most part--occasionally soaked  2. Constipation:  --improved  --taking fiber supplement.  3. Pelvic floor weakness  --had eval  --needs to schedule follow up visit   4. Vulvar atrophy-- concerns for lichen  --using clobetasol and estrogen cream--has not been using   5. Frequent UTIs (bladder infections):  --no current symptoms  6.microscopic hematuria  --normal cysto    01/25/2023  1. Mixed urinary incontinence, urge > stress with post-void urgency:  --reports having trouble urinating occasionally--occurs 1-2 times weekly  --denies urinary urgency  --taking  continue vesicare 5 mg daily.  --gemtessa was not covered .   2. Constipation:  --intermittent  3. Pelvic floor weakness/tension  --getting ready to start PT  4. Vulvar atrophy-- concerns for lichen  --no longer using clobetasol  5. Frequent UTIs (bladder infections):  --denies dysuria at this time  --taking cranberry pills  --8/2021 urine C&S (clean cath) + staph aureus (may have been skin  contaminant)  --continue taking 1 probiotic pill (any with lactobacillus and/or acidophilus) daily  --retroperitoneal ultrasound normal   --cystoscopy normal  but with moderate trabeculations               ---with PV urgency, concern for OAB   6. Microscopic hematuria  --retroperitoneal ultrasound normal  --cystoscopy normal  but with moderate trabeculations               ---with PV urgency, concern for OAB     2023  Treated for uti last in 2023  Called today complaining of inability to urinate. Reports this has happened a few times this week.  Has not been able to attend pelvic floor PT  Abdomen appears distended    Today  Here for joseph removal and teach cic to          Past Medical History:   Diagnosis Date    Cerebral palsy     Gait abnormality     Low blood pressure        Past Surgical History:   Procedure Laterality Date     SECTION      LAPAROSCOPIC SALPINGECTOMY Bilateral 7/10/2020    Procedure: SALPINGECTOMY, LAPAROSCOPIC;  Surgeon: Rosa Mcclendon MD;  Location: [facility];  Service: OB/GYN;  Laterality: Bilateral;  video confirmed  CW       History reviewed. No pertinent family history.    Social History     Socioeconomic History    Marital status:     Number of children: 1   Tobacco Use    Smoking status: Never    Smokeless tobacco: Never   Substance and Sexual Activity    Alcohol use: No    Drug use: No    Sexual activity: Yes     Partners: Male     Birth control/protection: Condom, Implant       Current Outpatient Medications   Medication Sig Dispense Refill    acetaminophen (TYLENOL) 500 MG tablet Take 1-2 tablets (500-1,000 mg total) by mouth 3 (three) times daily as needed for Pain.  0    AFLURIA QD -,3YR UP,,PF, 60 mcg (15 mcg x 4)/0.5 mL Syrg ADM 0.5ML IM UTD      baclofen (LIORESAL) 20 MG tablet Take 1 tablet (20 mg total) by mouth 3 (three) times daily. 90 tablet 2    calcium carbonate (CALTRATE 600 ORAL) Take by mouth once daily.      clobetasol 0.05%  "(TEMOVATE) 0.05 % Oint Apply topically once daily. 30 g 3    estradioL (ESTRACE) 0.01 % (0.1 mg/gram) vaginal cream Place 1 g vaginally once daily. 42.5 g 3    ibuprofen (ADVIL,MOTRIN) 600 MG tablet Take 1 tablet (600 mg total) by mouth every 6 (six) hours as needed for Pain (pain). 30 tablet 1    lidocaine HCL 2% (XYLOCAINE) 2 % jelly Apply topically 3 (three) times daily. Apply to affected area three times daily prn 30 mL 1    traMADoL (ULTRAM) 50 mg tablet Take 1 tablet (50 mg total) by mouth 3 (three) times daily as needed for Pain. 90 tablet 0    gabapentin (NEURONTIN) 300 MG capsule Take 1 capsule (300 mg total) by mouth As instructed (Take one capsule every morning and after noon, and two at bedtime (4 capsules total daily)). 120 capsule 3     No current facility-administered medications for this visit.       Review of patient's allergies indicates:  No Known Allergies    Well woman:  Pap test: 09/2021 normal HPV negative History of abnormal paps: No.  History of STIs:  No  Mammogram: n/a  Colonoscopy n/a  DEXA:  n/a    ROS:  As per HPI.      Exam  BP (!) 97/53 (BP Location: Left arm, Patient Position: Sitting, BP Method: Small (Automatic))   Pulse 78   Ht 5' 3" (1.6 m)   BMI 20.37 kg/m²   General: alert and oriented, no acute distress  Respiratory: normal respiratory effort  Abd: soft, non-tender, distended-- bladder palpable    Pelvic--deferred    Georges removed    Instructed  on cic. He was able to demonstrate correct catheterization process multiple times    Impression  1. Incomplete bladder emptying        2. Cerebral palsy, unspecified type        3. Pelvic floor tension        4. Hx of constipation              We reviewed the above issues and discussed options for short-term versus long-term management of her problems.   Plan:      1. Mixed urinary incontinence, urge > stress with post-void urgency:  --urine C&S pending  --URGE: STOP vesicare 5 mg daily. Gemtessa was not covered  For dry " mouth: get sour, sugar free lozenge or gum.    --STRESS:  Pessary vs. Sling.  --try to void every 2-3 hours.  --if you have not been able to urinate, please do self catheterization in 4 hours  --if you are not urinating on your own, please perform self catheterization 4 times daily   --referral to urology     2. Constipation:  --continue fiber supplement     3. Pelvic floor weakness/TENSION  --use vaginal valium twice daily as needed    4. Vulvar atrophy-- concerns for lichen  --twice weekly AM apply steroid ointment/cream:  Apply dime-sized amount with finger to vaginal opening, inner lips, and all external areas (including around anus) that are irritated.  DO NOT APPLY INTERNALLY.   --twice weekly PM apply estrogen cream.  Apply dime-sized amount with finger to vaginal opening, inner lips, and all external areas (including around anus) that are irritated. Also apply small amount inside vagina with finger (insert to knuckle).     5. Frequent UTIs (bladder infections):  --urine C&S pending  --8/2021 urine C&S (clean cath) + staph aureus (may have been skin contaminant)  --If you feel like you have a UTI, please call our office so that we can place an order for you to drop off a urine specimen at the closest [facility] lab (we will arrange).                --We will call in antibiotics for you to start right after you drop off specimen.                --In this way, we can determine:                           1)  Do you have a UTI?                            2) If you have a UTI, is it sensitive to the antibiotics we prescribed?   --follow UTI prevention tips (see attached)  --control bowel movements/fecal cross-contamination  --change your liner pad every time you pull your pants on  --empty bladder before and after intercourse  --continue taking 1 probiotic pill (any with lactobacillus and/or acidophilus) daily  --retroperitoneal ultrasound normal  --cystoscopy normal  but with moderate trabeculations                ---with PV urgency, concern for OAB              6. Microscopic hematuria  --retroperitoneal ultrasound normal  --cystoscopy normal today but with moderate trabeculations               ---with PV urgency, concern for OAB      7. concern for neurogenic bladder  --joseph inserted today  --urology referral for Dr. King or Dr. Davalos  --retroperitoneal ultrasound normal  --cmp --renal function normal    8. RTC in 2 months or sooner-- to see urology first      No LOS data to display  This includes face to face time and non-face to face time preparing to see the patient (eg, review of tests), obtaining and/or reviewing separately obtained history, documenting clinical information in the electronic or other health record, independently interpreting results and communicating results to the patient/family/caregiver, or care coordinator.    Анна Moreno, DAXA-BC [facility]  Division of Female Pelvic Medicine and Reconstructive Surgery  Department of Obstetrics & Gynecology

## 2023-04-06 NOTE — Clinical Note
Hi,  Could you please evaluate her for possible neurogenic bladder?  She does have CF and her mobility has diminished some in the past year. She does have significant pelvic floor tension, but I can not get her to buy into PT.  I have tried vaginal valium suppositories-- has helped some in the past. Her previous pvr's have been 130 (had uti) to 20.  She presented yesterday with urinary retention (1500).  I placed a joseph over night and taught her  how to perform CIC. She has been constipated in the past, but is not at the present time. Appreciate any help/ suggestions.  Robbie,  Анна

## 2023-04-10 ENCOUNTER — TELEPHONE (OUTPATIENT)
Dept: UROGYNECOLOGY | Facility: CLINIC | Age: 38
End: 2023-04-10
Payer: MEDICAID

## 2023-04-10 NOTE — TELEPHONE ENCOUNTER
"Called to clarify catheter prescription. [facility] doesn't have 12" female catheter, will replace with male catheter which is longer. Confirmed with RUBIO Jj.  "

## 2023-04-10 NOTE — TELEPHONE ENCOUNTER
----- Message from Arleth Gutiérrez sent at 4/10/2023 10:58 AM CDT -----  Regarding: CALL BACK  Name of Who is Callin CLIENT           What is the request in detail: client stated that he has an few question for the office in regards to an catheterization. Product is only available in 14'. Please contact to further discuss and advise.            Can the clinic reply by [facility]: NO           What [facility] to [facility] Back [facility]: [phone]

## 2023-04-13 ENCOUNTER — TELEPHONE (OUTPATIENT)
Dept: INTERNAL MEDICINE | Facility: CLINIC | Age: 38
End: 2023-04-13
Payer: MEDICAID

## 2023-04-13 NOTE — TELEPHONE ENCOUNTER
----- Message from Danielle Frazier sent at 4/12/2023  2:59 PM CDT -----  Type:Home Health(orders,updates,clarifications,etc)    Home Health Agency/Nurse: Robert/[facility]    Phone number: [phone]    Reason for call:    Comments:Robert called to inform that patient  has been discharge from home health, due to not meet the requirements with insurance. Thank you

## 2023-04-28 ENCOUNTER — TELEPHONE (OUTPATIENT)
Dept: UROLOGY | Facility: CLINIC | Age: 38
End: 2023-04-28
Payer: MEDICAID

## 2023-04-28 NOTE — TELEPHONE ENCOUNTER
Called pt to schedule an appt for her with Dr King. Pt states that she wants to see a female provider. I let her know that I would reach out to one of the nurses and someone should be contacting her.

## 2023-05-02 ENCOUNTER — TELEPHONE (OUTPATIENT)
Dept: INTERNAL MEDICINE | Facility: CLINIC | Age: 38
End: 2023-05-02
Payer: MEDICAID

## 2023-05-02 NOTE — TELEPHONE ENCOUNTER
----- Message from Maya Fuentes sent at 5/2/2023  9:01 AM CDT -----  Contact: [phone]  No blue slot available to schedule an appointment for the patient.  Patient is established with which PCP: Dr Cardoso  Reason for the visit: congestion and not feeling well. Has not been eating much   Would the patient like a call back, or a response through their [facility] portal?: callback  Comments: Pt would like to be seen on tomorrow                   Thank you

## 2023-05-03 ENCOUNTER — OFFICE VISIT (OUTPATIENT)
Dept: INTERNAL MEDICINE | Facility: CLINIC | Age: 38
End: 2023-05-03
Payer: MEDICAID

## 2023-05-03 VITALS
HEART RATE: 78 BPM | BODY MASS INDEX: 20.37 KG/M2 | DIASTOLIC BLOOD PRESSURE: 60 MMHG | OXYGEN SATURATION: 95 % | HEIGHT: 63 IN | TEMPERATURE: 98 F | SYSTOLIC BLOOD PRESSURE: 90 MMHG

## 2023-05-03 DIAGNOSIS — B96.89 ACUTE BACTERIAL BRONCHITIS: Primary | ICD-10-CM

## 2023-05-03 DIAGNOSIS — J20.8 ACUTE BACTERIAL BRONCHITIS: Primary | ICD-10-CM

## 2023-05-03 PROCEDURE — 1159F MED LIST DOCD IN RCRD: CPT | Mod: CPTII,,, | Performed by: INTERNAL MEDICINE

## 2023-05-03 PROCEDURE — 3008F PR BODY MASS INDEX (BMI) DOCUMENTED: ICD-10-PCS | Mod: CPTII,,, | Performed by: INTERNAL MEDICINE

## 2023-05-03 PROCEDURE — 99213 OFFICE O/P EST LOW 20 MIN: CPT | Mod: PBBFAC,PO | Performed by: INTERNAL MEDICINE

## 2023-05-03 PROCEDURE — 99214 PR OFFICE/OUTPT VISIT, EST, LEVL IV, 30-39 MIN: ICD-10-PCS | Mod: S$PBB,,, | Performed by: INTERNAL MEDICINE

## 2023-05-03 PROCEDURE — 99999 PR PBB SHADOW E&M-EST. PATIENT-LVL III: ICD-10-PCS | Mod: PBBFAC,,, | Performed by: INTERNAL MEDICINE

## 2023-05-03 PROCEDURE — 3078F DIAST BP <80 MM HG: CPT | Mod: CPTII,,, | Performed by: INTERNAL MEDICINE

## 2023-05-03 PROCEDURE — 3008F BODY MASS INDEX DOCD: CPT | Mod: CPTII,,, | Performed by: INTERNAL MEDICINE

## 2023-05-03 PROCEDURE — 1159F PR MEDICATION LIST DOCUMENTED IN MEDICAL RECORD: ICD-10-PCS | Mod: CPTII,,, | Performed by: INTERNAL MEDICINE

## 2023-05-03 PROCEDURE — 99214 OFFICE O/P EST MOD 30 MIN: CPT | Mod: S$PBB,,, | Performed by: INTERNAL MEDICINE

## 2023-05-03 PROCEDURE — 3074F PR MOST RECENT SYSTOLIC BLOOD PRESSURE < 130 MM HG: ICD-10-PCS | Mod: CPTII,,, | Performed by: INTERNAL MEDICINE

## 2023-05-03 PROCEDURE — 3074F SYST BP LT 130 MM HG: CPT | Mod: CPTII,,, | Performed by: INTERNAL MEDICINE

## 2023-05-03 PROCEDURE — 1160F RVW MEDS BY RX/DR IN RCRD: CPT | Mod: CPTII,,, | Performed by: INTERNAL MEDICINE

## 2023-05-03 PROCEDURE — 1160F PR REVIEW ALL MEDS BY PRESCRIBER/CLIN PHARMACIST DOCUMENTED: ICD-10-PCS | Mod: CPTII,,, | Performed by: INTERNAL MEDICINE

## 2023-05-03 PROCEDURE — 3078F PR MOST RECENT DIASTOLIC BLOOD PRESSURE < 80 MM HG: ICD-10-PCS | Mod: CPTII,,, | Performed by: INTERNAL MEDICINE

## 2023-05-03 PROCEDURE — 99999 PR PBB SHADOW E&M-EST. PATIENT-LVL III: CPT | Mod: PBBFAC,,, | Performed by: INTERNAL MEDICINE

## 2023-05-03 RX ORDER — DOXYCYCLINE 100 MG/1
100 CAPSULE ORAL 2 TIMES DAILY
Qty: 14 CAPSULE | Refills: 0 | Status: SHIPPED | OUTPATIENT
Start: 2023-05-03 | End: 2023-05-10

## 2023-05-03 RX ORDER — PROMETHAZINE HYDROCHLORIDE AND DEXTROMETHORPHAN HYDROBROMIDE 6.25; 15 MG/5ML; MG/5ML
5 SYRUP ORAL EVERY 8 HOURS PRN
Qty: 180 ML | Refills: 0 | Status: SHIPPED | OUTPATIENT
Start: 2023-05-03 | End: 2023-05-09 | Stop reason: SDUPTHER

## 2023-05-03 RX ORDER — ALBUTEROL SULFATE 90 UG/1
1-2 AEROSOL, METERED RESPIRATORY (INHALATION) EVERY 6 HOURS PRN
Qty: 18 G | Refills: 0 | Status: SHIPPED | OUTPATIENT
Start: 2023-05-03 | End: 2023-05-09

## 2023-05-03 NOTE — PROGRESS NOTES
Subjective:       Patient ID: Medina Hernandez is a 37 y.o. female.    Chief Complaint: Sore Throat and Cough    HPI    37-year-old female here for evaluation of a sore throat and cough.  This has been going on the last week or two.  She reports that she has had a headache.  She has cold and hot.  She has coughing and mucus.  Her cough is productive of white mucus.  She has chills.  She has chest congestion.  This is keeping her up at night.  She cannot really eat.  She is getting worse.      Review of Systems      Objective:      Physical Exam  Vitals reviewed.   Constitutional:       Appearance: She is well-developed.   HENT:      Head: Normocephalic and atraumatic.      Mouth/Throat:      Pharynx: No oropharyngeal exudate.   Eyes:      General: No scleral icterus.        Right eye: No discharge.         Left eye: No discharge.      Pupils: Pupils are equal, round, and reactive to light.   Neck:      Thyroid: No thyromegaly.      Trachea: No tracheal deviation.   Cardiovascular:      Rate and Rhythm: Normal rate and regular rhythm.      Heart sounds: Normal heart sounds. No murmur heard.    No friction rub. No gallop.   Pulmonary:      Effort: Pulmonary effort is normal. No respiratory distress.      Breath sounds: Normal breath sounds. No wheezing or rales.   Chest:      Chest wall: No tenderness.   Abdominal:      General: Bowel sounds are normal. There is no distension.      Palpations: Abdomen is soft. There is no mass.      Tenderness: There is no abdominal tenderness. There is no guarding or rebound.   Musculoskeletal:         General: No tenderness. Normal range of motion.      Cervical back: Normal range of motion and neck supple.   Skin:     General: Skin is warm and dry.      Coloration: Skin is not pale.      Findings: No erythema or rash.   Neurological:      Mental Status: She is alert and oriented to person, place, and time.   Psychiatric:         Behavior: Behavior normal.       Assessment:       1.  Acute bacterial bronchitis  - SARS Coronavirus 2 Antigen, POCT Manual Read      Plan:       1. Check rapid COVID swab.  Think this is bacterial.  Doxycycline 100 mg twice daily for 7 days prescribed.  Albuterol inhaler, Phenergan DM, cough syrup prescribed.

## 2023-05-04 ENCOUNTER — EXTERNAL HOME HEALTH (OUTPATIENT)
Dept: HOME HEALTH SERVICES | Facility: HOSPITAL | Age: 38
End: 2023-05-04
Payer: MEDICAID

## 2023-05-09 ENCOUNTER — DOCUMENT SCAN (OUTPATIENT)
Dept: HOME HEALTH SERVICES | Facility: HOSPITAL | Age: 38
End: 2023-05-09
Payer: MEDICAID

## 2023-05-09 ENCOUNTER — TELEPHONE (OUTPATIENT)
Dept: INTERNAL MEDICINE | Facility: CLINIC | Age: 38
End: 2023-05-09
Payer: MEDICAID

## 2023-05-09 RX ORDER — ALBUTEROL SULFATE 90 UG/1
AEROSOL, METERED RESPIRATORY (INHALATION)
Qty: 18 G | Refills: 0 | Status: SHIPPED | OUTPATIENT
Start: 2023-05-09

## 2023-05-09 RX ORDER — PROMETHAZINE HYDROCHLORIDE AND DEXTROMETHORPHAN HYDROBROMIDE 6.25; 15 MG/5ML; MG/5ML
SYRUP ORAL
Qty: 180 ML | Refills: 0 | OUTPATIENT
Start: 2023-05-09

## 2023-05-09 RX ORDER — PROMETHAZINE HYDROCHLORIDE AND DEXTROMETHORPHAN HYDROBROMIDE 6.25; 15 MG/5ML; MG/5ML
5 SYRUP ORAL EVERY 8 HOURS PRN
Qty: 180 ML | Refills: 0 | Status: SHIPPED | OUTPATIENT
Start: 2023-05-09 | End: 2023-05-19

## 2023-05-09 NOTE — TELEPHONE ENCOUNTER
----- Message from Mayo Landin sent at 5/9/2023  1:14 PM CDT -----  Contact: Pt [phone]  No blue slot available to schedule an appointment for the patient.  Patient is established with which PCP: Dr Cardoso  Reason for the visit: Physical  Would the patient like a call back, or a response through their [facility] portal?:  yes             Reason For Visit  ARA BLOOM is an established patient here today for a chief complaint of 2 week f/u .   :  services not used.   A chaperone is not applicable. She is unaccompanied.        Quality    Adult Wellness CI height documented, discussion of regular exercise, exercising regularly, printed information given for activities, discussion of nutritional quality of diet, patient education given about proper diet, not using alcohol, not having considered quitting drinking, not getting angry when talked to about drinking, not having a drink or two in the morning to get going, not drinking alcohol regularly, and feeling guilty about it, no tobacco use, did not provide intervention and counseling in regards to tobacco use, mammogram performed: 01/15/2016, does not have feelings of hopelessness (PHQ-2), no Anhedonia (PHQ-2), not referred to local mental health center, not taking medication for depression, no monitoring patient, no preventive medicine therapy for influenza, has not fallen within the last 12 months, able to walk, not taking aspirin and no discussion of risks and benefits of Aspirin.   Smoking Cessation CI no tobacco use and provided intervention and counseling in regards to tobacco use.      History of Present Illness  Here for f/u:      -c/o feeling hot all the time  - c/o irregular and heavier periods for the last several months  - has appointment to f/u with Gyne for possible hysterectomy soon  - c/o \"all my bones hurt ever since the chemoTx\"  - c/o ongoing right chest wall pain ever since the surgery and radiation for breast Ca were done  ( had a negative stress test, echo, chest xray recently)  - needs referral to f/u with plastic surgeon Dr. Grace  -c/o feeling tired all the time, ever since the Ca dx  denies feeling nervous, anxious or sad, no SI or HI  states that she still has all of the above symptoms despite taking all of the rx meds  and seeing psychologist, psychiatrist,  oncologist, etc    - wants to know the results of the PFTs  - Albuterol does not relieve her SOB  - states she has feeling of SOB both at rest sometimes but also at exertion, while working      .      Review of Systems    All other systems reviewed and negative.      Allergies  Penicillins    Current Meds   1. Capecitabine 500 MG Oral Tablet;   Therapy: (Recorded:[id number]) to Recorded   2. DULoxetine HCl - 30 MG Oral Capsule Delayed Release Particles; take 1 capsule after   breakfast and another after lunch;   Therapy: 23Oct2018 to (Evaluate:[id number])  Requested for: 23Oct2018; Last   Rx:23Oct2018 Ordered   3. Gabapentin 300 MG Oral Capsule; TAKE ONE CAPSULE BY MOUTH TWO TIMES A DAY;   Therapy: 12Oct2018 to Recorded   4. Ibuprofen 600 MG Oral Tablet; TAKE 1 TABLET 8 HOURS PRN PAIN WITH FOOD AS   NEEDED;   Therapy: 12Oct2018 to (Last Rx:12Oct2018)  Requested for: 12Oct2018 Ordered   5. LORazepam 0.5 MG Oral Tablet; take 0.5mg daily PRN acute anxiety or insomnia;   Therapy: 24Apr2018 to (Evaluate:21Jan2019); Last Rx:23Oct2018 Ordered   6. TraZODone HCl - 100 MG Oral Tablet; TAKE 1/2 TABLET BY MOUTH EVERY NIGHT AT   BEDTIME AS NEEDED FOR INSOMNIA;   Therapy: [id number] to (Evaluate:21Jan2019)  Requested for: 23Oct2018; Last   Rx:23Oct2018 Ordered   7. Ventolin  (90 Base) MCG/ACT Inhalation Aerosol Solution; INHALE ONE TO TWO   PUFFS BY MOUTH EVERY 4 TO 6 HOURS AS NEEDED;   Therapy: [id number] to (Last Rx:02Oct2018)  Requested for: 02Oct2018 Ordered    Active Problems  Allergy-induced asthma (J45.909)  Anxiety (F41.9)  Breast lump in female (N63.0)  Bronchitis (J40)  Chest wall pain (R07.89)  Environmental allergies (Z91.09)  Ovarian cyst (N83.209)  Preop examination (Z01.818)  Primary breast malignancy, right (C50.911)  Purulent postnasal drainage (J32.9)  Screening for diabetes mellitus (DM) (Z13.1)  Shortness of breath (R06.02)  Wheezing (R06.2)    Past Medical History  History of depression (Z86.59)  History  of pelvic inflammatory disease (Z87.42)  History of seasonal allergies (Z88.9)    Surgical History  History of Breast Surgery Lumpectomy  History of  Section   · 2 daughters  History of [place] Lymph Node Biopsy    Family History  Mother   No pertinent family history  Father   No pertinent family history    Social History  Currently working   · Cleaning houses    Never smoker  No alcohol use    Review  Past medical history, problem list, family medical history, surgical history and social history reviewed.      Vitals  Signs   Recorded: 2018 03:48PM   Height: 5 ft 1 in  Weight: 148 lb 0.64 oz  BMI Calculated: 27.97  BSA Calculated: 1.66  Systolic: 123, LUE, Sitting  Diastolic: 81, LUE, Sitting  Heart Rate: 74, R Radial  Pulse Quality: Normal, R Radial  O2 Saturation: 100    Physical Exam  Constitutional: alert, in no acute distress and current vital signs reviewed.   Head and Face: atraumatic, no deformities, normocephalic, normal facies.   Eyes: no discharge, normal conjunctiva, no eyelid swelling, no ptosis and the sclerae were normal. pupils equal, round and reactive to light and accommodation, conjugate gaze and extraocular movements were intact.   ENT: normal appearing outer ear, normal appearing nose. examination of the tympanic membrane showed normal landmarks, normal appearing external canal. nasal mucosa moist and pink, no nasal discharge. normal lips. oral mucosa pink and moist, no oral lesions.   Neck: normal appearing neck, supple neck and no mass was seen. thyroid not enlarged and no thyroid nodules.   Lymphatic: no lymphadenopathy.   Chest: normal chest appearance.   Pulmonary: no respiratory distress, normal respiratory rate and effort and no accessory muscle use. breath sounds clear to auscultation bilaterally.   Cardiovascular: normal rate, no murmurs were heard, regular rhythm, normal S1 and normal S2.   Abdomen: soft, nontender, nondistended, normal bowel sounds and no  abdominal mass. no hepatomegaly and no splenomegaly. no umbilical hernia was discovered.   Musculoskeletal: normal gait. no musculoskeletal erythema was seen, no joint swelling seen and no joint tenderness was elicited. no scoliosis. normal range of motion. there was no joint instability noted. muscle strength and tone were normal.   Neurologic: cranial nerves grossly intact. normal DTRs. no sensory deficits noted. no coordination deficits. normal gait. muscle strength and tone were normal.   Psychiatric: oriented to person, oriented to place and oriented to time. alert and awake, interactive and mood/affect were appropriate. judgement not impaired. normal attention span. short term memory intact.   Skin, Hair, Nails: normal skin color and pigmentation and no rash. no foot ulcers and no skin ulcer was seen. normal skin turgor. no clubbing or cyanosis of the fingernails.      Assessment  Primary breast malignancy, right (C50.911)   · Assessed By: LINDA FISH (Primary Care); Last Assessed: 02 Nov 2018  Chest wall pain (R07.89)   · Assessed By: LINDA FISH (Primary Care); Last Assessed: 03 Nov 2018  Shortness of breath (R06.02)   · Assessed By: LINDA FISH (Primary Care); Last Assessed: 03 Nov 2018  Body aches (R52)   · Assessed By: LINDA FISH (Primary Care); Last Assessed: 03 Nov 2018  Depression with anxiety (F41.8)   · Assessed By: LINDA FISH (Primary Care); Last Assessed: 03 Nov 2018    Plan  Plastic Surgery Referral/Consult Treatment and Evaluation  eval and treat, needs f/u after  reconstructive surgery  Dr. Grace  Status: Active  Requested for: 02Nov2018  Care Summary provided. : Yes  Plans:     Plan:   Musculosceletal pains-  ?toxicity SE from chemotx  pt is interested in checking for \"bone metastases\"  advised to discuss with Dr. Quintanilla her oncologist that  will monitor for now and to cont exercises at home, Tramadol or tylenol prn  to increase evening dose of Gabapentin to 600mg  f/u in  4 weeks    SOB-  ?anxiety induced likely  PFTs - wnl  can continue Albuterol only if providing any relief  to cont to work with psychiatry and cont SSRI    Depressioon/anxiety-  cont SNRI and Lorazepam daily  f/u with counsellor and psychiatrist    .   Follow-up in the office in 4 week(s).   Medical compliance with plan discussed and risks of non-compliance reviewed.    Patient education completed on disease process, etiology & prognosis.    Patient expresses understanding of the plan.    Proper usage and side effects of medications reviewed & discussed.    Refer to orders.    Return to clinic as clinically indicated as discussed with patient who verbalized understanding of & agreement with the plan.    Written handout given.      Signatures   Electronically signed by : Josie Muse CMA; Nov 2 2018  3:43PM CST    Electronically signed by : LINDA FISH MD; Nov  3 2018  7:41AM CST

## 2023-05-09 NOTE — TELEPHONE ENCOUNTER
No care due was identified.  Health [facility] Embedded Care Due Messages. Reference number: [id number].   5/09/2023 12:34:25 PM CDT

## 2023-05-09 NOTE — TELEPHONE ENCOUNTER
I spoke to the patient and she is too early for an annual.   She is not due until after 8/25/23.    Cough medication and inhaler renewed.

## 2023-05-24 ENCOUNTER — OFFICE VISIT (OUTPATIENT)
Dept: UROGYNECOLOGY | Facility: CLINIC | Age: 38
End: 2023-05-24
Payer: MEDICAID

## 2023-05-24 DIAGNOSIS — R33.9 URINARY RETENTION: Primary | ICD-10-CM

## 2023-05-24 DIAGNOSIS — N90.5 VULVAR ATROPHY: ICD-10-CM

## 2023-05-24 DIAGNOSIS — G80.9 CEREBRAL PALSY, UNSPECIFIED TYPE: ICD-10-CM

## 2023-05-24 DIAGNOSIS — N39.0 FREQUENT UTI: ICD-10-CM

## 2023-05-24 DIAGNOSIS — M62.89 PELVIC FLOOR TENSION: ICD-10-CM

## 2023-05-24 DIAGNOSIS — Z87.19 HX OF CONSTIPATION: ICD-10-CM

## 2023-05-24 PROCEDURE — 99999 PR PBB SHADOW E&M-EST. PATIENT-LVL III: ICD-10-PCS | Mod: PBBFAC,,, | Performed by: NURSE PRACTITIONER

## 2023-05-24 PROCEDURE — 1160F PR REVIEW ALL MEDS BY PRESCRIBER/CLIN PHARMACIST DOCUMENTED: ICD-10-PCS | Mod: CPTII,,, | Performed by: NURSE PRACTITIONER

## 2023-05-24 PROCEDURE — 99999 PR PBB SHADOW E&M-EST. PATIENT-LVL III: CPT | Mod: PBBFAC,,, | Performed by: NURSE PRACTITIONER

## 2023-05-24 PROCEDURE — 51701 PR INSERTION OF NON-INDWELLING BLADDER CATHETERIZATION FOR RESIDUAL UR: ICD-10-PCS | Mod: S$PBB,,, | Performed by: NURSE PRACTITIONER

## 2023-05-24 PROCEDURE — 87186 SC STD MICRODIL/AGAR DIL: CPT | Performed by: NURSE PRACTITIONER

## 2023-05-24 PROCEDURE — 1159F PR MEDICATION LIST DOCUMENTED IN MEDICAL RECORD: ICD-10-PCS | Mod: CPTII,,, | Performed by: NURSE PRACTITIONER

## 2023-05-24 PROCEDURE — 1160F RVW MEDS BY RX/DR IN RCRD: CPT | Mod: CPTII,,, | Performed by: NURSE PRACTITIONER

## 2023-05-24 PROCEDURE — 99213 PR OFFICE/OUTPT VISIT, EST, LEVL III, 20-29 MIN: ICD-10-PCS | Mod: 25,S$PBB,, | Performed by: NURSE PRACTITIONER

## 2023-05-24 PROCEDURE — 99213 OFFICE O/P EST LOW 20 MIN: CPT | Mod: PBBFAC | Performed by: NURSE PRACTITIONER

## 2023-05-24 PROCEDURE — 51701 INSERT BLADDER CATHETER: CPT | Mod: S$PBB,,, | Performed by: NURSE PRACTITIONER

## 2023-05-24 PROCEDURE — 87077 CULTURE AEROBIC IDENTIFY: CPT | Performed by: NURSE PRACTITIONER

## 2023-05-24 PROCEDURE — 87086 URINE CULTURE/COLONY COUNT: CPT | Performed by: NURSE PRACTITIONER

## 2023-05-24 PROCEDURE — 51701 INSERT BLADDER CATHETER: CPT | Mod: PBBFAC | Performed by: NURSE PRACTITIONER

## 2023-05-24 PROCEDURE — 1159F MED LIST DOCD IN RCRD: CPT | Mod: CPTII,,, | Performed by: NURSE PRACTITIONER

## 2023-05-24 PROCEDURE — 87088 URINE BACTERIA CULTURE: CPT | Performed by: NURSE PRACTITIONER

## 2023-05-24 PROCEDURE — 99213 OFFICE O/P EST LOW 20 MIN: CPT | Mod: 25,S$PBB,, | Performed by: NURSE PRACTITIONER

## 2023-05-24 RX ORDER — ESTRADIOL 0.1 MG/G
1 CREAM VAGINAL DAILY
Qty: 42.5 G | Refills: 3 | Status: SHIPPED | OUTPATIENT
Start: 2023-05-24 | End: 2024-03-20

## 2023-05-24 NOTE — PROGRESS NOTES
Urogyn follow up  05/24/2023  .  [facility]  [address]  [facility] [address]    Medina Hernandez  [id number]  1985      Medina Hernandez is a 37 y.o.  here for a urogyn follow up for urinary retention.    Last HPI from 09/24/2021  1)  Mixed urinary incontinence, urge > stress:    --+ urinary urgency  --not leaking urine     2. Constipation:  --taking fiber supplement.     3. Pelvic floor weakness  --has not started pelvic floor PT        4. Vulvar atrophy-- concerns for lichen  --using clobetasol and estrogen cream     5. Frequent UTIs (bladder infections):  --no current symptoms     6.microscopic hematuria  --6 rbc in cath specimen    02/09/2022  1)  Mixed urinary incontinence, urge > stress:    --+ urinary urgency/ UUI  --taking vesicare 5 mg daily--feels like she can not initiate void sometime  --voiding every hour during the day  --nocturia 2/ night--does not limit fluids  --1-2 pullups/ day  --minimally wet for the most part--occasionally soaked  2. Constipation:  --improved  --taking fiber supplement.  3. Pelvic floor weakness  --had eval  --needs to schedule follow up visit   4. Vulvar atrophy-- concerns for lichen  --using clobetasol and estrogen cream--has not been using   5. Frequent UTIs (bladder infections):  --no current symptoms  6.microscopic hematuria  --normal cysto 11/2021 01/25/2023  1. Mixed urinary incontinence, urge > stress with post-void urgency:  --reports having trouble urinating occasionally--occurs 1-2 times weekly  --denies urinary urgency  --taking  continue vesicare 5 mg daily.  --gemtessa was not covered .   2. Constipation:  --intermittent  3. Pelvic floor weakness/tension  --getting ready to start PT  4. Vulvar atrophy-- concerns for lichen  --no longer using clobetasol  5. Frequent UTIs (bladder infections):  --denies dysuria at this time  --taking cranberry pills  --8/2021 urine C&S (clean cath) + staph aureus (may have been skin  "contaminant)  --continue taking 1 probiotic pill (any with lactobacillus and/or acidophilus) daily  --retroperitoneal ultrasound normal   --cystoscopy normal  but with moderate trabeculations               ---with PV urgency, concern for OAB   6. Microscopic hematuria  --retroperitoneal ultrasound normal  --cystoscopy normal  but with moderate trabeculations               ---with PV urgency, concern for OAB     2023  Treated for uti last in 2023  Called today complaining of inability to urinate. Reports this has happened a few times this week.  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 King for eval, but she is requesting a female staff provider.  Denies constipation         Past Medical History:   Diagnosis Date    Cerebral palsy     Gait abnormality     Low blood pressure        Past Surgical History:   Procedure Laterality Date     SECTION      LAPAROSCOPIC SALPINGECTOMY Bilateral 7/10/2020    Procedure: SALPINGECTOMY, LAPAROSCOPIC;  Surgeon: Rosa Mcclendon MD;  Location: [facility];  Service: OB/GYN;  Laterality: Bilateral;  video confirmed  CW       History reviewed. No pertinent family history.    Social History     Socioeconomic History    Marital status:     Number of children: 1   Tobacco Use    Smoking status: Never     Passive exposure: Never    Smokeless tobacco: Never   Substance and Sexual Activity    Alcohol use: No    Drug use: No    Sexual activity: Yes     Partners: Male     Birth control/protection: Condom, Implant       Current Outpatient Medications   Medication Sig Dispense Refill    acetaminophen (TYLENOL) 500 MG tablet Take 1-2 tablets (500-1,000 mg total) by mouth 3 (three) times daily as needed for Pain.  0    AFLURIA QD " 2020-21,3YR UP,,PF, 60 mcg (15 mcg x 4)/0.5 mL Syrg ADM 0.5ML IM UTD      albuterol (PROVENTIL/VENTOLIN HFA) 90 mcg/actuation inhaler INHALE 1 TO 2 PUFFS INTO THE LUNGS EVERY 6 HOURS AS NEEDED 18 g 0    baclofen (LIORESAL) 20 MG tablet TAKE 1 TABLET(20 MG) BY MOUTH THREE TIMES DAILY 90 tablet 2    calcium carbonate (CALTRATE 600 ORAL) Take by mouth once daily.      clobetasol 0.05% (TEMOVATE) 0.05 % Oint Apply topically once daily. 30 g 3    ibuprofen (ADVIL,MOTRIN) 600 MG tablet Take 1 tablet (600 mg total) by mouth every 6 (six) hours as needed for Pain (pain). 30 tablet 1    lidocaine HCL 2% (XYLOCAINE) 2 % jelly Apply topically 3 (three) times daily. Apply to affected area three times daily prn 30 mL 1    traMADoL (ULTRAM) 50 mg tablet Take 1 tablet (50 mg total) by mouth 3 (three) times daily as needed for Pain. 90 tablet 0    estradioL (ESTRACE) 0.01 % (0.1 mg/gram) vaginal cream Place 1 g vaginally once daily. 42.5 g 3    gabapentin (NEURONTIN) 300 MG capsule Take 1 capsule (300 mg total) by mouth As instructed (Take one capsule every morning and after noon, and two at bedtime (4 capsules total daily)). 120 capsule 3     No current facility-administered medications for this visit.       Review of patient's allergies indicates:  No Known Allergies    Well woman:  Pap test: 09/2021 normal HPV negative History of abnormal paps: No.  History of STIs:  No  Mammogram: n/a  Colonoscopy n/a  DEXA:  n/a    ROS:  As per HPI.      Exam  LMP 05/20/2023 (Exact Date)   General: alert and oriented, no acute distress  Respiratory: normal respiratory effort  Abd: soft, non-tender, distended-- bladder palpable    Pelvic--deferred    Cath specimen obtained.  330 ml cloudy yellow urine (pvr-- she voided approximately 50 mL prior to cath)    Impression  1. Urinary retention  Urine culture      2. Cerebral palsy, unspecified type        3. Pelvic floor tension        4. Hx of constipation                We reviewed the above  issues and discussed options for short-term versus long-term management of her problems.   Plan:      1. Mixed urinary incontinence, urge > stress with post-void urgency:  --urine C&S pending  --URGE: STOP no longer taking vesicare Gemtessa was not covered    --STRESS:  Pessary vs. Sling.  --try to void every 2-3 hours.  --if you have not been able to urinate, please do self catheterization in 4 hours  --perform catheterization at least twice daily  --referral to urology     2. Constipation:  --continue fiber supplement     3. Pelvic floor weakness/TENSION  --use vaginal valium twice daily as needed    4. Vulvar atrophy-- concerns for lichen  --twice weekly AM apply steroid ointment/cream:  Apply dime-sized amount with finger to vaginal opening, inner lips, and all external areas (including around anus) that are irritated.  DO NOT APPLY INTERNALLY.   --twice weekly PM apply estrogen cream.  Apply dime-sized amount with finger to vaginal opening, inner lips, and all external areas (including around anus) that are irritated. Also apply small amount inside vagina with finger (insert to knuckle).     5. Frequent UTIs (bladder infections):  --urine C&S pending  --8/2021 urine C&S (clean cath) + staph aureus (may have been skin contaminant)  --If you feel like you have a UTI, please call our office so that we can place an order for you to drop off a urine specimen at the closest [facility] lab (we will arrange).                --We will call in antibiotics for you to start right after you drop off specimen.                --In this way, we can determine:                           1)  Do you have a UTI?                            2) If you have a UTI, is it sensitive to the antibiotics we prescribed?   --follow UTI prevention tips (see attached)  --control bowel movements/fecal cross-contamination  --change your liner pad every time you pull your pants on  --empty bladder before and after intercourse  --continue taking 1  probiotic pill (any with lactobacillus and/or acidophilus) daily  --retroperitoneal ultrasound normal 04/2023)  --cystoscopy normal  but with moderate trabeculations 11/2021              ---with PV urgency, concern for OAB              6. Microscopic hematuria  --retroperitoneal ultrasound normal  --cystoscopy normal today but with moderate trabeculations               ---with PV urgency, concern for OAB      7. concern for neurogenic bladder  --have your  catheterize you 2 times daily  --urology referral for  Dr. Davalos (would like to see a female)  --retroperitoneal ultrasound normal  --cmp --renal function normal    8. To see urology      I spent a total of 20 minutes on the day of the visit.  This includes face to face time and non-face to face time preparing to see the patient (eg, review of tests), obtaining and/or reviewing separately obtained history, documenting clinical information in the electronic or other health record, independently interpreting results and communicating results to the patient/family/caregiver, or care coordinator.    Анна Moreno, DAXA-BC  [facility]  Division of Female Pelvic Medicine and Reconstructive Surgery  Department of Obstetrics & Gynecology

## 2023-05-24 NOTE — Clinical Note
Dr. Davalos,  Can you please evaluate her for neurogenic bladder. She has intermittent uti's with some retention but her retention has become worse over the past year. I have scheduled her to see Fernando in April, but she prefers to see a female (refused to see Fernando-- was scheduled with Gerry Conley, but would [facility]).  At this time I am having her  do intermittent cath twice a day.    I am sending a urine culture today  Thanks, melyssa

## 2023-05-28 LAB — BACTERIA UR CULT: ABNORMAL

## 2023-05-29 ENCOUNTER — PATIENT MESSAGE (OUTPATIENT)
Dept: UROGYNECOLOGY | Facility: CLINIC | Age: 38
End: 2023-05-29
Payer: MEDICAID

## 2023-05-29 DIAGNOSIS — N30.00 ACUTE CYSTITIS WITHOUT HEMATURIA: Primary | ICD-10-CM

## 2023-05-29 RX ORDER — NITROFURANTOIN 25; 75 MG/1; MG/1
100 CAPSULE ORAL 2 TIMES DAILY
Qty: 14 CAPSULE | Refills: 0 | Status: SHIPPED | OUTPATIENT
Start: 2023-05-29 | End: 2024-03-20 | Stop reason: ALTCHOICE

## 2023-05-31 ENCOUNTER — TELEPHONE (OUTPATIENT)
Dept: UROLOGY | Facility: CLINIC | Age: 38
End: 2023-05-31
Payer: MEDICAID

## 2023-05-31 NOTE — TELEPHONE ENCOUNTER
----- Message from Georgia Cheatham RN sent at 5/30/2023  4:43 PM CDT -----  Regarding: FW: Urology [phone]  Can you see if Ambar will see this patient sooner. No one in [place]?  ----- Message -----  From: Tessy Miguel  Sent: 5/30/2023   4:36 PM CDT  To: [facility] Staff  Subject: Urology [phone]                        Name of Caller:  Odalys    When is the first available appointment?  7/28/23    Symptoms:  Incomplete bladder emptying [R33.9]    Would the patient rather a call back or a response via [facility]?  call back    Best Call Back Number [phone]      Additional Information: this is a new medicaid patient with a referral on file from Анна oMreno NP . This appt is the first available with any MD.  Patient states she is have pain and she needs to be seen sooner. Can you assist with getting patient schedule. Please advise. Thanks

## 2023-06-05 ENCOUNTER — OFFICE VISIT (OUTPATIENT)
Dept: UROLOGY | Facility: CLINIC | Age: 38
End: 2023-06-05
Payer: MEDICAID

## 2023-06-05 VITALS
DIASTOLIC BLOOD PRESSURE: 61 MMHG | HEART RATE: 91 BPM | WEIGHT: 115 LBS | HEIGHT: 63 IN | BODY MASS INDEX: 20.38 KG/M2 | SYSTOLIC BLOOD PRESSURE: 96 MMHG

## 2023-06-05 DIAGNOSIS — R39.11 HESITANCY OF MICTURITION: Primary | ICD-10-CM

## 2023-06-05 DIAGNOSIS — R33.9 INCOMPLETE BLADDER EMPTYING: ICD-10-CM

## 2023-06-05 DIAGNOSIS — G80.9 CEREBRAL PALSY, UNSPECIFIED TYPE: ICD-10-CM

## 2023-06-05 PROCEDURE — 3074F PR MOST RECENT SYSTOLIC BLOOD PRESSURE < 130 MM HG: ICD-10-PCS | Mod: CPTII,,, | Performed by: UROLOGY

## 2023-06-05 PROCEDURE — 3078F PR MOST RECENT DIASTOLIC BLOOD PRESSURE < 80 MM HG: ICD-10-PCS | Mod: CPTII,,, | Performed by: UROLOGY

## 2023-06-05 PROCEDURE — 51701 INSERT BLADDER CATHETER: CPT | Mod: S$PBB,,, | Performed by: UROLOGY

## 2023-06-05 PROCEDURE — 3074F SYST BP LT 130 MM HG: CPT | Mod: CPTII,,, | Performed by: UROLOGY

## 2023-06-05 PROCEDURE — 99213 OFFICE O/P EST LOW 20 MIN: CPT | Mod: PBBFAC | Performed by: UROLOGY

## 2023-06-05 PROCEDURE — 3078F DIAST BP <80 MM HG: CPT | Mod: CPTII,,, | Performed by: UROLOGY

## 2023-06-05 PROCEDURE — 3008F PR BODY MASS INDEX (BMI) DOCUMENTED: ICD-10-PCS | Mod: CPTII,,, | Performed by: UROLOGY

## 2023-06-05 PROCEDURE — 51701 INSERT BLADDER CATHETER: CPT | Mod: PBBFAC | Performed by: UROLOGY

## 2023-06-05 PROCEDURE — 99205 OFFICE O/P NEW HI 60 MIN: CPT | Mod: S$PBB,25,, | Performed by: UROLOGY

## 2023-06-05 PROCEDURE — 1159F MED LIST DOCD IN RCRD: CPT | Mod: CPTII,,, | Performed by: UROLOGY

## 2023-06-05 PROCEDURE — 99999 PR PBB SHADOW E&M-EST. PATIENT-LVL III: ICD-10-PCS | Mod: PBBFAC,,, | Performed by: UROLOGY

## 2023-06-05 PROCEDURE — 99205 PR OFFICE/OUTPT VISIT, NEW, LEVL V, 60-74 MIN: ICD-10-PCS | Mod: S$PBB,25,, | Performed by: UROLOGY

## 2023-06-05 PROCEDURE — 81002 URINALYSIS NONAUTO W/O SCOPE: CPT | Mod: PBBFAC | Performed by: UROLOGY

## 2023-06-05 PROCEDURE — 51701 PR INSERTION OF NON-INDWELLING BLADDER CATHETERIZATION FOR RESIDUAL UR: ICD-10-PCS | Mod: S$PBB,,, | Performed by: UROLOGY

## 2023-06-05 PROCEDURE — 1159F PR MEDICATION LIST DOCUMENTED IN MEDICAL RECORD: ICD-10-PCS | Mod: CPTII,,, | Performed by: UROLOGY

## 2023-06-05 PROCEDURE — 3008F BODY MASS INDEX DOCD: CPT | Mod: CPTII,,, | Performed by: UROLOGY

## 2023-06-05 PROCEDURE — 99999 PR PBB SHADOW E&M-EST. PATIENT-LVL III: CPT | Mod: PBBFAC,,, | Performed by: UROLOGY

## 2023-06-05 RX ORDER — UBIDECARENONE 75 MG
500 CAPSULE ORAL DAILY
COMMUNITY

## 2023-06-05 NOTE — PROGRESS NOTES
CHIEF COMPLAINT:    Mrs. Hernandez is a 37 y.o. female presenting for a consultation at the request of Анна Moreno. Patient presents with urge incontinence and incomplete bladder emptying.    PRESENTING ILLNESS:    Medina Hernandez is a 37 y.o. female who is presents with a history of cerebral palsy.  She is here alone but called her mother on speaker phone to listen to the visit.  She states that she incompletely empties her bladder, sometimes going to the toilet and having hesitancy x 5 minutes.  She also has urge incontinence, which she manages with a pull up.  Her mother states that she may go 15 days when she is able to urinate but then will have difficulty one day.  The patient will note swelling, and she will have significant urgency, suprapubic tenderness.  Her  knows how to catheterize her but does not do this regularly.  At home, she walks with a walker but if she leaves her home, she has to use a wheel chair.  She is by herself at home during the day, son is 7 years old,  works.  Her mother lives in [place].     She has a history of recurrent UTI, when she is on antibiotics, she finds that it is easier to urinate.      , uterus in place, she is status post tubal ligation, sexually active, no pain, has a bowel movement every other day.      REVIEW OF SYSTEMS:    Review of Systems   Constitutional: Negative.    HENT: Negative.     Eyes: Negative.    Respiratory: Negative.     Cardiovascular: Negative.    Gastrointestinal:         BM every other day.    Genitourinary:  Positive for urgency.        Hesitancy   Musculoskeletal: Negative.    Skin: Negative.    Neurological:         Cerebral palsy   Endo/Heme/Allergies: Negative.    Psychiatric/Behavioral: Negative.       PATIENT HISTORY:    Past Medical History:   Diagnosis Date    Cerebral palsy     Gait abnormality     Low blood pressure        Past Surgical History:   Procedure Laterality Date     SECTION      LAPAROSCOPIC  SALPINGECTOMY Bilateral 7/10/2020    Procedure: SALPINGECTOMY, LAPAROSCOPIC;  Surgeon: Rosa Mcclendon MD;  Location: [facility] OR;  Service: OB/GYN;  Laterality: Bilateral;  video confirmed 7/9 CW       No family history on file.    Social History     Socioeconomic History    Marital status:     Number of children: 1   Tobacco Use    Smoking status: Never     Passive exposure: Never    Smokeless tobacco: Never   Substance and Sexual Activity    Alcohol use: No    Drug use: No    Sexual activity: Yes     Partners: Male     Birth control/protection: Condom, Implant       Allergies:  Patient has no known allergies.    Medications:  Outpatient Encounter Medications as of 6/5/2023   Medication Sig Dispense Refill    acetaminophen (TYLENOL) 500 MG tablet Take 1-2 tablets (500-1,000 mg total) by mouth 3 (three) times daily as needed for Pain.  0    albuterol (PROVENTIL/VENTOLIN HFA) 90 mcg/actuation inhaler INHALE 1 TO 2 PUFFS INTO THE LUNGS EVERY 6 HOURS AS NEEDED 18 g 0    baclofen (LIORESAL) 20 MG tablet TAKE 1 TABLET(20 MG) BY MOUTH THREE TIMES DAILY 90 tablet 2    calcium carbonate (CALTRATE 600 ORAL) Take by mouth once daily.      clobetasol 0.05% (TEMOVATE) 0.05 % Oint Apply topically once daily. 30 g 3    cyanocobalamin (VITAMIN B-12) 500 MCG tablet Take 500 mcg by mouth once daily.      ibuprofen (ADVIL,MOTRIN) 600 MG tablet Take 1 tablet (600 mg total) by mouth every 6 (six) hours as needed for Pain (pain). 30 tablet 1    nitrofurantoin, macrocrystal-monohydrate, (MACROBID) 100 MG capsule Take 1 capsule (100 mg total) by mouth 2 (two) times daily. 14 capsule 0    AFLURIA QD 2020-21,3YR UP,,PF, 60 mcg (15 mcg x 4)/0.5 mL Syrg ADM 0.5ML IM UTD      estradioL (ESTRACE) 0.01 % (0.1 mg/gram) vaginal cream Place 1 g vaginally once daily. (Patient not taking: Reported on 6/5/2023) 42.5 g 3    lidocaine HCL 2% (XYLOCAINE) 2 % jelly Apply topically 3 (three) times daily. Apply to affected area three times daily prn  (Patient not taking: Reported on 6/5/2023) 30 mL 1     No facility-administered encounter medications on file as of 6/5/2023.         PHYSICAL EXAMINATION:    The patient generally appears in good health, is appropriately interactive, and is in no apparent distress.    Skin: No lesions.    Mental: Cooperative with normal affect.    Neuro: Grossly intact.    HEENT: Normal. No evidence of lymphadenopathy.    Chest:  normal inspiratory effort.    Abdomen: Soft, non-tender. No masses or organomegaly. Bladder is not palpable. No evidence of flank discomfort. No evidence of inguinal hernia.    Extremities: No clubbing, cyanosis, or edema    Normal external female genitalia  Urethral meatus is normal  Urethra and bladder are nontender to bimanual exam  Well supported anteriorly and posteriorly   Uterus and cervix are normal  No adnexal masses  PVR by catheterization was 90 ml    LABS:    Lab Results   Component Value Date    BUN 10 04/06/2023    CREATININE 0.7 04/06/2023       UA 1.020, pH 6, otherwise, negative.     IMPRESSION:    Cerebral palsy  Urge incontinence  Hesitancy    PLAN:    1.  For FUDS/cysto  2.  Would consider for SNM pending the results    I spent 60 minutes with the patient of which more than half was spent in direct consultation with the patient in regards to our treatment and plan.      Copy to: Анна Moreno NP

## 2023-06-06 ENCOUNTER — TELEPHONE (OUTPATIENT)
Dept: UROLOGY | Facility: CLINIC | Age: 38
End: 2023-06-06
Payer: MEDICAID

## 2023-06-06 DIAGNOSIS — R39.11 HESITANCY OF MICTURITION: ICD-10-CM

## 2023-06-06 DIAGNOSIS — R33.9 INCOMPLETE BLADDER EMPTYING: Primary | ICD-10-CM

## 2023-06-06 NOTE — TELEPHONE ENCOUNTER
Returned the patient call to confirm the surgery date was indeed 7/18 at 12 noon. I insured the patient I will call the day before surgery with an arrival time. Patient verbalized understanding.

## 2023-06-06 NOTE — TELEPHONE ENCOUNTER
Called the patient to offer surgery date of 7/18/23, patient accepted. I informed the patient I will give her a call the day before surgery with arrival time and pre-op instructions. Patient verbalized understanding.

## 2023-07-17 ENCOUNTER — TELEPHONE (OUTPATIENT)
Dept: UROLOGY | Facility: CLINIC | Age: 38
End: 2023-07-17
Payer: MEDICAID

## 2023-07-17 NOTE — TELEPHONE ENCOUNTER
Called patient to confirm arrival time of 11am for FUDs procedure on 7/18/23.  Gave location and explained to pt that there is no fasting for this procedure. Patient verbalized understanding.

## 2023-07-18 ENCOUNTER — TELEPHONE (OUTPATIENT)
Dept: UROLOGY | Facility: HOSPITAL | Age: 38
End: 2023-07-18

## 2023-07-18 ENCOUNTER — HOSPITAL ENCOUNTER (OUTPATIENT)
Facility: HOSPITAL | Age: 38
Discharge: HOME | End: 2023-07-18
Attending: UROLOGY | Admitting: UROLOGY
Payer: MEDICAID

## 2023-07-18 VITALS
TEMPERATURE: 99 F | SYSTOLIC BLOOD PRESSURE: 93 MMHG | DIASTOLIC BLOOD PRESSURE: 57 MMHG | RESPIRATION RATE: 16 BRPM | HEART RATE: 77 BPM | BODY MASS INDEX: 20.37 KG/M2 | WEIGHT: 115 LBS | OXYGEN SATURATION: 100 %

## 2023-07-18 DIAGNOSIS — N39.8 VOIDING DYSFUNCTION: ICD-10-CM

## 2023-07-18 PROCEDURE — 52000 CYSTOURETHROSCOPY: CPT | Mod: 59,,, | Performed by: UROLOGY

## 2023-07-18 PROCEDURE — 51727 CYSTOMETROGRAM W/UP: CPT | Mod: 26,,, | Performed by: UROLOGY

## 2023-07-18 PROCEDURE — 27200970 HC CYSTO SUPPLY I (SIMPLE): Performed by: UROLOGY

## 2023-07-18 PROCEDURE — 51784 PR ANAL/URINARY MUSCLE STUDY: ICD-10-PCS | Mod: 26,51,, | Performed by: UROLOGY

## 2023-07-18 PROCEDURE — 74430 CONTRAST X-RAY BLADDER: CPT | Mod: 26,,, | Performed by: UROLOGY

## 2023-07-18 PROCEDURE — 27201008 HC FLEXSCOPE: Performed by: UROLOGY

## 2023-07-18 PROCEDURE — 51727 CYSTOMETROGRAM W/UP: ICD-10-PCS | Mod: 26,,, | Performed by: UROLOGY

## 2023-07-18 PROCEDURE — 51600 INJECTION FOR BLADDER X-RAY: CPT | Mod: 51,,, | Performed by: UROLOGY

## 2023-07-18 PROCEDURE — 36000704 HC OR TIME LEV I 1ST 15 MIN: Performed by: UROLOGY

## 2023-07-18 PROCEDURE — 36000705 HC OR TIME LEV I EA ADD 15 MIN: Performed by: UROLOGY

## 2023-07-18 PROCEDURE — 52000 PR CYSTOURETHROSCOPY: ICD-10-PCS | Mod: 59,,, | Performed by: UROLOGY

## 2023-07-18 PROCEDURE — 27200973 HC CYSTO SUPPLY IV (URODYNAMICS): Performed by: UROLOGY

## 2023-07-18 PROCEDURE — 74430 PR  X-RAY CYSTOGRAM, MIN 3 VIEW: ICD-10-PCS | Mod: 26,,, | Performed by: UROLOGY

## 2023-07-18 PROCEDURE — 51784 ANAL/URINARY MUSCLE STUDY: CPT | Mod: 26,51,, | Performed by: UROLOGY

## 2023-07-18 PROCEDURE — 51600 PR INJECTION FOR BLADDER X-RAY: ICD-10-PCS | Mod: 51,,, | Performed by: UROLOGY

## 2023-07-18 NOTE — BRIEF OP NOTE
[facility] (1st Fl)  Brief Operative Note    Surgery Date: 7/18/2023     Surgeon(s) and Role:     * Bev Davalos MD - Primary    Assisting Surgeon: None    Pre-op Diagnosis:  Incomplete bladder emptying [R33.9]  Hesitancy of micturition [R39.11]    Post-op Diagnosis:  Post-Op Diagnosis Codes:     * Incomplete bladder emptying [R33.9]     * Hesitancy of micturition [R39.11]    Procedure(s) (LRB):  URODYNAMIC STUDY, FLUOROSCOPIC (N/A)    Anesthesia: N/A    Operative Findings:  Detrusor overactivity and DESD, increased sensation    Estimated Blood Loss:  none         Specimens:   Specimen (24h ago, onward)      None              Discharge Note    OUTCOME: Patient tolerated treatment/procedure well without complication and is now ready for discharge.    DISPOSITION: Home or Self Care    FINAL DIAGNOSIS:  <principal problem not specified>    FOLLOWUP: In clinic    DISCHARGE INSTRUCTIONS:     ACTIVITY  There are no restrictions in activity. Start doing again the things you did before the procedure.  You may experience a slight burning sensation. You may notice a small amount of blood in your urine. This will clear up within a day. Call the clinic if this continues beyond 48 hours.     DIET  Continue your normal diet. You may eat the same foods you ate before your procedure.  Drink plenty of fluids during the first 24-48 hours following your procedure.     MEDICATIONS  Resume all other previous medications from your prescribing physician.  Continue any pre=procedure antibiotics until they are all gone.     SIGNS AND SYMPTOMS TO REPORT TO THE DOCTOR  Chills or fever greater than 101° F within 24 hours of procedure.  Changes in urination, such as increased bleeding, foul smell, cloudy urine, or painful urination.  Call your doctor with any questions or concerns.     For any emergency situation, call 911 immediately or go to your nearest emergency room.

## 2023-07-18 NOTE — OP NOTE
Date of procedure:  7/18/2023    Fluoro Urodynamic Report    Indication:  hesitancy, urge incontinence and incomplete bladder emptying    Patient was taken to the Urodynamic Suite with a comfortably full bladder and asked to perform a free uroflow.  Next, the patient was prepped and the urinary residual was drained with a 14 Fr catheter.  A 7 Fr dual lumen catheter was placed to measure intravesical pressures.  A 10 Fr balloon manometer was placed into the rectum for abdominal pressure measurements.  Patch EMG electrodes were placed on the perineum.  The patient was connected to the [employer] Urodynamic machine, using a multichannel technique.  The bladder was filled with Cysto ConRay at room temperature at a rate of 30 ml/min.  Patient is filled to urgency.  Filling is performed with the patient in the seated position.  Abdominal leak point pressures are checked at 1st desire, then serially at 50cc increments first with Valsalva then with coughing.  The patient was then asked to sit and void for a pressure flow study.    The following are the results of the study:  1.  Uroflow       Q max:  could not void    2.  Volume drained from the bladder:  100 ml    3.  CMG       Sensation:         First Desire:  60 ml         Normal Desire:  171.7 ml         Strong Desire:         Urgency:       Capacity: 300 ml       Abnormal Contractions: had DO starting at 172 ml infused and could not void       Compliance:  10 ml/cm H2O    4.  Abdominal Leak Point Pressure:  no stress incontinence    5.  EMG:  normal guarding, increased attempting to void    6.  Voiding phase       Q max:  could not void       P det at Q max:       Pattern of the curve:       Voided volume:       Amount at the end of the study:  300 ml    7.  Fluoroscopy--I have personally interpreted the images and the results are as follows:  bladder was relatively smooth, there was dilation of the proximal urethral when attempting to urinate.     8.  Analysis:   increased sensation, detrusor overactivity, DESD with inability to void    9.  Recommendations:       A.  She states she has an issue voiding twice a month       b.  Will teach CIC (presently, her  does it but the patient can write so has the manual dexterity)       c.  Discussed the role of Botox (off label use, not a one an done, needs to be repeated every 3-6 months, may not work very well) or sacral neuromodulation, suprapubic tube.   The patient was counseled with her mother about the above options and they would like to try CIC first.       CYSTOSCOPY REPORT    Pre Procedure Diagnosis:  DESD, neurogenic bladder    Post Procedure Diagnosis:  same, trabeculated bladder    Anesthesia: 10 cc 2% lidocaine jelly applied per urethra.    14 FR Flexible Olympus cystoscope used.    FINDINGS:  Dome, anterior, posterior, lateral walls and bladder base free of urothelial abnormalities. She had grade II trabeculations throughout. Right and left ureteral orifices in the normal postion and configuration, both effluxed clear urine.  Bladder neck and urethra were normal. Small bladder capacity    Specimen:  none    The patient was taken to the cystoscopy suite and placed in dorsal lithotomy position.  The genitalia was prepped and draped  in the usual sterile fashion.  Two percent lidocaine jelly was inserted in the urethra.  After sufficent time had passed to allow good local anesthesia, the cystoscope was inserted in the urethra and passed into the bladder visualizing the urethra along its entire course.  The dome, anterior, posterior and lateral walls were examined systematically.  The ureteral orifices were in their usual position and configuration.  The cystoscope was turned upon itself 180 degrees to visualize the bladder neck.  The cystoscope was then brought to the level of the bladder neck, the water was turned on and the urethra was visualized.  The cystoscope was removed and the patient was instructed to  urinate prior to leaving the office.     ASSESSMENT/PLAN:  37 year old woman status post flexible cystoscopy.  1. Push fluids for 24 hours.  2. May see blood in the urine, this should gradually improve over the next 2-3 days.  3. The patient was instructed to return to the office or go to the emergency should fever, chills, cloudy urine, or inability to urinate develop.  4. Follow up to be taught CIC.

## 2023-07-18 NOTE — H&P
CHIEF COMPLAINT:    Mrs. Hernandez is a 37 y.o. female presenting for fluorourodynamics and cystoscopy in a patient with history of cerebral palsy, incomplete emptying, hesitancy and urge incontinence  PRESENTING ILLNESS:    Medina Hernandez is a 37 y.o. female who is presents with a history of cerebral palsy.  She is here alone but called her mother on speaker phone to listen to the visit.  She states that she incompletely empties her bladder, sometimes going to the toilet and having hesitancy x 5 minutes.  She also has urge incontinence, which she manages with a pull up.  Her mother states that she may go 15 days when she is able to urinate but then will have difficulty one day.  The patient will note swelling, and she will have significant urgency, suprapubic tenderness.  Her  knows how to catheterize her but does not do this regularly.  At home, she walks with a walker but if she leaves her home, she has to use a wheel chair.  She is by herself at home during the day, son is 7 years old,  works.  Her mother lives in [place].      She has a history of recurrent UTI, when she is on antibiotics, she finds that it is easier to urinate.       , uterus in place, she is status post tubal ligation, sexually active, no pain, has a bowel movement every other day.      REVIEW OF SYSTEMS:    ROS    PATIENT HISTORY:    Past Medical History:   Diagnosis Date    Cerebral palsy     Gait abnormality     Low blood pressure        Past Surgical History:   Procedure Laterality Date     SECTION      LAPAROSCOPIC SALPINGECTOMY Bilateral 7/10/2020    Procedure: SALPINGECTOMY, LAPAROSCOPIC;  Surgeon: Rosa Mcclendon MD;  Location: [facility];  Service: OB/GYN;  Laterality: Bilateral;  video confirmed  CW       No family history on file.    Social History     Socioeconomic History    Marital status:     Number of children: 1   Tobacco Use    Smoking status: Never     Passive exposure: Never    Smokeless  tobacco: Never   Substance and Sexual Activity    Alcohol use: No    Drug use: No    Sexual activity: Yes     Partners: Male     Birth control/protection: Condom, Implant       Allergies:  Patient has no known allergies.    Medications:  [unfilled]      PHYSICAL EXAMINATION:    The patient generally appears in good health, is appropriately interactive, and is in no apparent distress.    Skin: No lesions.    Mental: Cooperative with normal affect.    Neuro: Grossly intact.    HEENT: Normal. No evidence of lymphadenopathy.    Chest:  normal inspiratory effort.    Abdomen: Soft, non-tender. No masses or organomegaly. Bladder is not palpable. No evidence of flank discomfort. No evidence of inguinal hernia.    Extremities: No clubbing, cyanosis, or edema      LABS:    Lab Results   Component Value Date    BUN 10 04/06/2023    CREATININE 0.7 04/06/2023       IMPRESSION:    Urinary hesitancy, incomplete emptying and urge incontinence.      PLAN:    1. For FUDS/cysto

## 2023-07-28 ENCOUNTER — TELEPHONE (OUTPATIENT)
Dept: NEUROLOGY | Facility: CLINIC | Age: 38
End: 2023-07-28
Payer: MEDICAID

## 2023-07-28 NOTE — TELEPHONE ENCOUNTER
----- Message from Jerome Pacheco sent at 7/28/2023 10:07 AM CDT -----  Contact: [phone] @ patient  Requesting an RX refill or new RX.baclofen (LIORESAL) 20 MG tablet  Is this a refill or new RX: refill  RX name and strength (copy/paste from chart):    Is this a 30 day or 90 day RX:   Pharmacy name and phone # [id number] - [place] - [address] [facility] [address]  The doctors have asked that we provide their patients with the following 2 reminders -- prescription refills can take up to 72 hours, and a friendly reminder that in the future you can use your [facility] account to request refills:

## 2023-07-30 DIAGNOSIS — G11.9 ATAXIA DUE TO CEREBELLAR DEGENERATION: ICD-10-CM

## 2023-07-31 RX ORDER — BACLOFEN 20 MG/1
20 TABLET ORAL 3 TIMES DAILY
Qty: 90 TABLET | Refills: 2 | Status: SHIPPED | OUTPATIENT
Start: 2023-07-31 | End: 2024-01-31

## 2023-08-02 ENCOUNTER — OFFICE VISIT (OUTPATIENT)
Dept: UROLOGY | Facility: CLINIC | Age: 38
End: 2023-08-02
Payer: MEDICAID

## 2023-08-02 VITALS
HEIGHT: 63 IN | BODY MASS INDEX: 18.75 KG/M2 | SYSTOLIC BLOOD PRESSURE: 93 MMHG | DIASTOLIC BLOOD PRESSURE: 58 MMHG | WEIGHT: 105.81 LBS | HEART RATE: 80 BPM

## 2023-08-02 DIAGNOSIS — R33.9 INCOMPLETE BLADDER EMPTYING: Primary | ICD-10-CM

## 2023-08-02 DIAGNOSIS — R39.11 HESITANCY OF MICTURITION: ICD-10-CM

## 2023-08-02 DIAGNOSIS — G80.9 CEREBRAL PALSY, UNSPECIFIED TYPE: ICD-10-CM

## 2023-08-02 PROCEDURE — 3078F DIAST BP <80 MM HG: CPT | Mod: CPTII,,, | Performed by: NURSE PRACTITIONER

## 2023-08-02 PROCEDURE — 3008F PR BODY MASS INDEX (BMI) DOCUMENTED: ICD-10-PCS | Mod: CPTII,,, | Performed by: NURSE PRACTITIONER

## 2023-08-02 PROCEDURE — 99999 PR PBB SHADOW E&M-EST. PATIENT-LVL III: ICD-10-PCS | Mod: PBBFAC,,, | Performed by: NURSE PRACTITIONER

## 2023-08-02 PROCEDURE — 1160F PR REVIEW ALL MEDS BY PRESCRIBER/CLIN PHARMACIST DOCUMENTED: ICD-10-PCS | Mod: CPTII,,, | Performed by: NURSE PRACTITIONER

## 2023-08-02 PROCEDURE — 3008F BODY MASS INDEX DOCD: CPT | Mod: CPTII,,, | Performed by: NURSE PRACTITIONER

## 2023-08-02 PROCEDURE — 99213 OFFICE O/P EST LOW 20 MIN: CPT | Mod: PBBFAC | Performed by: NURSE PRACTITIONER

## 2023-08-02 PROCEDURE — 3074F SYST BP LT 130 MM HG: CPT | Mod: CPTII,,, | Performed by: NURSE PRACTITIONER

## 2023-08-02 PROCEDURE — 1159F PR MEDICATION LIST DOCUMENTED IN MEDICAL RECORD: ICD-10-PCS | Mod: CPTII,,, | Performed by: NURSE PRACTITIONER

## 2023-08-02 PROCEDURE — 3074F PR MOST RECENT SYSTOLIC BLOOD PRESSURE < 130 MM HG: ICD-10-PCS | Mod: CPTII,,, | Performed by: NURSE PRACTITIONER

## 2023-08-02 PROCEDURE — 1159F MED LIST DOCD IN RCRD: CPT | Mod: CPTII,,, | Performed by: NURSE PRACTITIONER

## 2023-08-02 PROCEDURE — 1160F RVW MEDS BY RX/DR IN RCRD: CPT | Mod: CPTII,,, | Performed by: NURSE PRACTITIONER

## 2023-08-02 PROCEDURE — 3078F PR MOST RECENT DIASTOLIC BLOOD PRESSURE < 80 MM HG: ICD-10-PCS | Mod: CPTII,,, | Performed by: NURSE PRACTITIONER

## 2023-08-02 PROCEDURE — 99214 OFFICE O/P EST MOD 30 MIN: CPT | Mod: S$PBB,,, | Performed by: NURSE PRACTITIONER

## 2023-08-02 PROCEDURE — 99999 PR PBB SHADOW E&M-EST. PATIENT-LVL III: CPT | Mod: PBBFAC,,, | Performed by: NURSE PRACTITIONER

## 2023-08-02 PROCEDURE — 99214 PR OFFICE/OUTPT VISIT, EST, LEVL IV, 30-39 MIN: ICD-10-PCS | Mod: S$PBB,,, | Performed by: NURSE PRACTITIONER

## 2023-08-02 NOTE — PROGRESS NOTES
CHIEF COMPLAINT:    Mrs. Hernandez is a 37 y.o. female presenting for CIC teaching.    PRESENTING ILLNESS:    Medina Hernandez is a 37 y.o. female who has history of cerebral palsy.  She is here today with her .  Recently evaluated by Dr. Davalos.  Presents to learn CIC for herself when  unavailable to perform catheterization.  At home, she walks with a walker but if she leaves her home, she has to use a wheel chair.  She is by herself at home during the day, son is 7 years old,  works.  Aylin attempted to teach patient.  Unfortunately due to physical limitation patient is unable to perform CIC.      REVIEW OF SYSTEMS:    Review of Systems   Constitutional: Negative.    HENT: Negative.     Eyes: Negative.    Respiratory: Negative.     Cardiovascular: Negative.    Gastrointestinal:         BM every other day.    Genitourinary:  Positive for urgency.        Hesitancy   Musculoskeletal: Negative.    Skin: Negative.    Neurological:         Cerebral palsy   Endo/Heme/Allergies: Negative.    Psychiatric/Behavioral: Negative.         PATIENT HISTORY:    Past Medical History:   Diagnosis Date    Cerebral palsy     Gait abnormality     Low blood pressure        Past Surgical History:   Procedure Laterality Date     SECTION      FLUOROSCOPIC URODYNAMIC STUDY N/A 2023    Procedure: URODYNAMIC STUDY, FLUOROSCOPIC;  Surgeon: Bev Davalos MD;  Location: [address];  Service: Urology;  Laterality: N/A;    LAPAROSCOPIC SALPINGECTOMY Bilateral 7/10/2020    Procedure: SALPINGECTOMY, LAPAROSCOPIC;  Surgeon: Rosa Mcclendon MD;  Location: [facility];  Service: OB/GYN;  Laterality: Bilateral;  video confirmed  CW       History reviewed. No pertinent family history.    Social History     Socioeconomic History    Marital status:     Number of children: 1   Tobacco Use    Smoking status: Never     Passive exposure: Never    Smokeless tobacco: Never   Substance and Sexual Activity    Alcohol  use: No    Drug use: No    Sexual activity: Yes     Partners: Male     Birth control/protection: Condom, Implant       Allergies:  Patient has no known allergies.    Medications:  Outpatient Encounter Medications as of 6/5/2023   Medication Sig Dispense Refill    acetaminophen (TYLENOL) 500 MG tablet Take 1-2 tablets (500-1,000 mg total) by mouth 3 (three) times daily as needed for Pain.  0    albuterol (PROVENTIL/VENTOLIN HFA) 90 mcg/actuation inhaler INHALE 1 TO 2 PUFFS INTO THE LUNGS EVERY 6 HOURS AS NEEDED 18 g 0    baclofen (LIORESAL) 20 MG tablet TAKE 1 TABLET(20 MG) BY MOUTH THREE TIMES DAILY 90 tablet 2    calcium carbonate (CALTRATE 600 ORAL) Take by mouth once daily.      clobetasol 0.05% (TEMOVATE) 0.05 % Oint Apply topically once daily. 30 g 3    cyanocobalamin (VITAMIN B-12) 500 MCG tablet Take 500 mcg by mouth once daily.      ibuprofen (ADVIL,MOTRIN) 600 MG tablet Take 1 tablet (600 mg total) by mouth every 6 (six) hours as needed for Pain (pain). 30 tablet 1    nitrofurantoin, macrocrystal-monohydrate, (MACROBID) 100 MG capsule Take 1 capsule (100 mg total) by mouth 2 (two) times daily. 14 capsule 0    AFLURIA QD 2020-21,3YR UP,,PF, 60 mcg (15 mcg x 4)/0.5 mL Syrg ADM 0.5ML IM UTD      estradioL (ESTRACE) 0.01 % (0.1 mg/gram) vaginal cream Place 1 g vaginally once daily. (Patient not taking: Reported on 6/5/2023) 42.5 g 3    lidocaine HCL 2% (XYLOCAINE) 2 % jelly Apply topically 3 (three) times daily. Apply to affected area three times daily prn (Patient not taking: Reported on 6/5/2023) 30 mL 1     No facility-administered encounter medications on file as of 6/5/2023.         PHYSICAL EXAMINATION:    Physical Exam  Vitals and nursing note reviewed.   Constitutional:       Appearance: Normal appearance.   HENT:      Head: Normocephalic and atraumatic.   Eyes:      Pupils: Pupils are equal, round, and reactive to light.   Pulmonary:      Effort: Pulmonary effort is normal.   Skin:     General: Skin is  warm and dry.   Neurological:      Mental Status: She is alert and oriented to person, place, and time. Mental status is at baseline.         LABS:    Lab Results   Component Value Date    BUN 10 04/06/2023    CREATININE 0.7 04/06/2023       IMPRESSION:    Cerebral palsy  Urge incontinence  Hesitancy    PLAN:    1.  Incomplete bladder emptying   - unable to perform cic herself   -  to continue performing catheterization  2. Rtc for f/u with Dr. Davalos      I spent 30 minutes with the patient. Over 50% of the visit was spent in counseling.

## 2023-10-24 ENCOUNTER — TELEPHONE (OUTPATIENT)
Dept: INTERNAL MEDICINE | Facility: CLINIC | Age: 38
End: 2023-10-24
Payer: MEDICAID

## 2023-10-24 DIAGNOSIS — R26.9 GAIT DISORDER: Primary | ICD-10-CM

## 2023-10-24 DIAGNOSIS — G80.9 CEREBRAL PALSY, UNSPECIFIED TYPE: ICD-10-CM

## 2023-10-24 NOTE — TELEPHONE ENCOUNTER
----- Message from Mario Alberto Berkowitz sent at 10/24/2023  3:11 PM CDT -----  Contact: [phone]  [email or web address]     Patient is calling for Medical Advice regarding: would like to start physical therapy     How long has patient had these symptoms:    Pharmacy name and phone#:    Would like response via [facility]: call back     Comments:        4

## 2023-10-27 ENCOUNTER — PATIENT MESSAGE (OUTPATIENT)
Dept: INTERNAL MEDICINE | Facility: CLINIC | Age: 38
End: 2023-10-27
Payer: MEDICAID

## 2024-01-23 NOTE — PATIENT INSTRUCTIONS
1. Mixed urinary incontinence, urge > stress with post-void urgency:  --urine C&S pending  --URGE: STOP vesicare 5 mg daily. Gemtessa was not covered  For dry mouth: get sour, sugar free lozenge or gum.    --STRESS:  Pessary vs. Sling.  --try to void every 2-3 hours.  --if you have not been able to urinate, please do self catheterization in 4 hours  --perform catheterization at least twice daily  --referral to urology     2. Constipation:  --continue fiber supplement     3. Pelvic floor weakness/TENSION  --use vaginal valium twice daily as needed    4. Vulvar atrophy-- concerns for lichen  --twice weekly AM apply steroid ointment/cream:  Apply dime-sized amount with finger to vaginal opening, inner lips, and all external areas (including around anus) that are irritated.  DO NOT APPLY INTERNALLY.   --twice weekly PM apply estrogen cream.  Apply dime-sized amount with finger to vaginal opening, inner lips, and all external areas (including around anus) that are irritated. Also apply small amount inside vagina with finger (insert to knuckle).     5. Frequent UTIs (bladder infections):  --urine C&S pending  --8/2021 urine C&S (clean cath) + staph aureus (may have been skin contaminant)  --If you feel like you have a UTI, please call our office so that we can place an order for you to drop off a urine specimen at the closest [facility] lab (we will arrange).                --We will call in antibiotics for you to start right after you drop off specimen.                --In this way, we can determine:                           1)  Do you have a UTI?                            2) If you have a UTI, is it sensitive to the antibiotics we prescribed?   --follow UTI prevention tips (see attached)  --control bowel movements/fecal cross-contamination  --change your liner pad every time you pull your pants on  --empty bladder before and after intercourse  --continue taking 1 probiotic pill (any with lactobacillus and/or  acidophilus) daily  --retroperitoneal ultrasound normal  --cystoscopy normal  but with moderate trabeculations               ---with PV urgency, concern for OAB              6. Microscopic hematuria  --retroperitoneal ultrasound normal  --cystoscopy normal today but with moderate trabeculations               ---with PV urgency, concern for OAB      7. concern for neurogenic bladder  --have your  catheterize you 2 times daily  --urology referral for  Dr. Davalos (would like to see a female)  --retroperitoneal ultrasound normal  --cmp --renal function normal    8. To see urology   no dysuria/normal urinary frequency

## 2024-01-31 DIAGNOSIS — G11.9 ATAXIA DUE TO CEREBELLAR DEGENERATION: ICD-10-CM

## 2024-01-31 RX ORDER — BACLOFEN 20 MG/1
20 TABLET ORAL 3 TIMES DAILY
Qty: 90 TABLET | Refills: 2 | Status: SHIPPED | OUTPATIENT
Start: 2024-01-31 | End: 2024-02-04 | Stop reason: SDUPTHER

## 2024-02-01 ENCOUNTER — TELEPHONE (OUTPATIENT)
Dept: INTERNAL MEDICINE | Facility: CLINIC | Age: 39
End: 2024-02-01
Payer: MEDICAID

## 2024-02-01 DIAGNOSIS — M54.50 CHRONIC LOW BACK PAIN, UNSPECIFIED BACK PAIN LATERALITY, UNSPECIFIED WHETHER SCIATICA PRESENT: Primary | ICD-10-CM

## 2024-02-01 DIAGNOSIS — G89.29 CHRONIC LOW BACK PAIN, UNSPECIFIED BACK PAIN LATERALITY, UNSPECIFIED WHETHER SCIATICA PRESENT: Primary | ICD-10-CM

## 2024-02-01 DIAGNOSIS — G80.9 CEREBRAL PALSY, UNSPECIFIED TYPE: ICD-10-CM

## 2024-02-01 NOTE — TELEPHONE ENCOUNTER
The patient requesting referral;    Referral to what specialty:  Physical Medicine and Rehabilitation    Does the patient want the referral with a specific physician:  Shiloh Archuleta MD    [facility]   Reason (be specific):  Back and Leg pain

## 2024-02-01 NOTE — TELEPHONE ENCOUNTER
----- Message from Precious Ott sent at 2/1/2024  5:01 PM CST -----  Contact: [phone]  Patient would like to get a referral.  Referral to what specialty:  Physical Medicine and Rehabilitation    Does the patient want the referral with a specific physician:  Shiloh Archuleta MD    Is the specialist [facility] or [facility]   Reason (be specific):  Back and Leg pain   Does the patient already have the specialty clinic appointment scheduled:  No   If yes, what date is the appointment scheduled:   no   Is the insurance listed in Epic correct? (this is important for a referral):    Advised patient that once provider approves this either a nurse or  will return their call?:

## 2024-02-02 NOTE — TELEPHONE ENCOUNTER
Spoke to pt in regards to referral ordered for PM-R and referral coordinator was contacted to assist with scheduling. Pt christophe

## 2024-02-04 DIAGNOSIS — G11.9 ATAXIA DUE TO CEREBELLAR DEGENERATION: ICD-10-CM

## 2024-02-05 RX ORDER — BACLOFEN 20 MG/1
20 TABLET ORAL 3 TIMES DAILY
Qty: 90 TABLET | Refills: 2 | Status: SHIPPED | OUTPATIENT
Start: 2024-02-05 | End: 2024-06-17 | Stop reason: SDUPTHER

## 2024-02-21 ENCOUNTER — TELEPHONE (OUTPATIENT)
Dept: INTERNAL MEDICINE | Facility: CLINIC | Age: 39
End: 2024-02-21
Payer: MEDICAID

## 2024-02-21 DIAGNOSIS — Z00.00 ANNUAL PHYSICAL EXAM: Primary | ICD-10-CM

## 2024-02-21 NOTE — TELEPHONE ENCOUNTER
----- Message from Samantha Ross sent at 2/21/2024 11:37 AM CST -----  Contact: self  [phone]  Est medicaid pt requesting a call for appt.    Please call and advise

## 2024-03-13 ENCOUNTER — LAB VISIT (OUTPATIENT)
Dept: LAB | Facility: HOSPITAL | Age: 39
End: 2024-03-13
Attending: HOSPITALIST
Payer: MEDICAID

## 2024-03-13 DIAGNOSIS — Z00.00 ANNUAL PHYSICAL EXAM: ICD-10-CM

## 2024-03-13 LAB
ALBUMIN SERPL BCP-MCNC: 4.2 G/DL (ref 3.5–5.2)
ALP SERPL-CCNC: 47 U/L (ref 55–135)
ALT SERPL W/O P-5'-P-CCNC: 11 U/L (ref 10–44)
ANION GAP SERPL CALC-SCNC: 6 MMOL/L (ref 8–16)
AST SERPL-CCNC: 17 U/L (ref 10–40)
BASOPHILS # BLD AUTO: 0.02 K/UL (ref 0–0.2)
BASOPHILS NFR BLD: 0.5 % (ref 0–1.9)
BILIRUB SERPL-MCNC: 0.5 MG/DL (ref 0.1–1)
BUN SERPL-MCNC: 9 MG/DL (ref 6–20)
CALCIUM SERPL-MCNC: 9.5 MG/DL (ref 8.7–10.5)
CHLORIDE SERPL-SCNC: 109 MMOL/L (ref 95–110)
CHOLEST SERPL-MCNC: 123 MG/DL (ref 120–199)
CHOLEST/HDLC SERPL: 2.7 {RATIO} (ref 2–5)
CO2 SERPL-SCNC: 25 MMOL/L (ref 23–29)
CREAT SERPL-MCNC: 0.7 MG/DL (ref 0.5–1.4)
DIFFERENTIAL METHOD BLD: ABNORMAL
EOSINOPHIL # BLD AUTO: 0 K/UL (ref 0–0.5)
EOSINOPHIL NFR BLD: 1.1 % (ref 0–8)
ERYTHROCYTE [DISTWIDTH] IN BLOOD BY AUTOMATED COUNT: 12.5 % (ref 11.5–14.5)
EST. GFR  (NO RACE VARIABLE): >60 ML/MIN/1.73 M^2
ESTIMATED AVG GLUCOSE: 91 MG/DL (ref 68–131)
GLUCOSE SERPL-MCNC: 89 MG/DL (ref 70–110)
HBA1C MFR BLD: 4.8 % (ref 4–5.6)
HCT VFR BLD AUTO: 38.4 % (ref 37–48.5)
HDLC SERPL-MCNC: 45 MG/DL (ref 40–75)
HDLC SERPL: 36.6 % (ref 20–50)
HGB BLD-MCNC: 12.8 G/DL (ref 12–16)
IMM GRANULOCYTES # BLD AUTO: 0 K/UL (ref 0–0.04)
IMM GRANULOCYTES NFR BLD AUTO: 0 % (ref 0–0.5)
LDLC SERPL CALC-MCNC: 72.2 MG/DL (ref 63–159)
LYMPHOCYTES # BLD AUTO: 1 K/UL (ref 1–4.8)
LYMPHOCYTES NFR BLD: 27.2 % (ref 18–48)
MCH RBC QN AUTO: 31.1 PG (ref 27–31)
MCHC RBC AUTO-ENTMCNC: 33.3 G/DL (ref 32–36)
MCV RBC AUTO: 93 FL (ref 82–98)
MONOCYTES # BLD AUTO: 0.3 K/UL (ref 0.3–1)
MONOCYTES NFR BLD: 8.2 % (ref 4–15)
NEUTROPHILS # BLD AUTO: 2.4 K/UL (ref 1.8–7.7)
NEUTROPHILS NFR BLD: 63 % (ref 38–73)
NONHDLC SERPL-MCNC: 78 MG/DL
NRBC BLD-RTO: 0 /100 WBC
PLATELET # BLD AUTO: 281 K/UL (ref 150–450)
PMV BLD AUTO: 10.6 FL (ref 9.2–12.9)
POTASSIUM SERPL-SCNC: 3.7 MMOL/L (ref 3.5–5.1)
PROT SERPL-MCNC: 7.2 G/DL (ref 6–8.4)
RBC # BLD AUTO: 4.12 M/UL (ref 4–5.4)
SODIUM SERPL-SCNC: 140 MMOL/L (ref 136–145)
TRIGL SERPL-MCNC: 29 MG/DL (ref 30–150)
TSH SERPL DL<=0.005 MIU/L-ACNC: 1.31 UIU/ML (ref 0.4–4)
WBC # BLD AUTO: 3.78 K/UL (ref 3.9–12.7)

## 2024-03-13 PROCEDURE — 36415 COLL VENOUS BLD VENIPUNCTURE: CPT | Mod: PO | Performed by: HOSPITALIST

## 2024-03-13 PROCEDURE — 80061 LIPID PANEL: CPT | Performed by: HOSPITALIST

## 2024-03-13 PROCEDURE — 80053 COMPREHEN METABOLIC PANEL: CPT | Performed by: HOSPITALIST

## 2024-03-13 PROCEDURE — 83036 HEMOGLOBIN GLYCOSYLATED A1C: CPT | Performed by: HOSPITALIST

## 2024-03-13 PROCEDURE — 85025 COMPLETE CBC W/AUTO DIFF WBC: CPT | Performed by: HOSPITALIST

## 2024-03-13 PROCEDURE — 84443 ASSAY THYROID STIM HORMONE: CPT | Performed by: HOSPITALIST

## 2024-03-20 ENCOUNTER — OFFICE VISIT (OUTPATIENT)
Dept: INTERNAL MEDICINE | Facility: CLINIC | Age: 39
End: 2024-03-20
Payer: MEDICAID

## 2024-03-20 VITALS
RESPIRATION RATE: 18 BRPM | SYSTOLIC BLOOD PRESSURE: 100 MMHG | HEART RATE: 80 BPM | OXYGEN SATURATION: 100 % | DIASTOLIC BLOOD PRESSURE: 56 MMHG | TEMPERATURE: 98 F

## 2024-03-20 DIAGNOSIS — R29.898 LEG WEAKNESS, BILATERAL: ICD-10-CM

## 2024-03-20 DIAGNOSIS — G80.9 CEREBRAL PALSY, UNSPECIFIED TYPE: ICD-10-CM

## 2024-03-20 DIAGNOSIS — Z00.00 ENCOUNTER FOR PREVENTIVE HEALTH EXAMINATION: Primary | ICD-10-CM

## 2024-03-20 DIAGNOSIS — Z74.09 IMPAIRED MOBILITY AND ACTIVITIES OF DAILY LIVING: ICD-10-CM

## 2024-03-20 DIAGNOSIS — Z78.9 IMPAIRED MOBILITY AND ACTIVITIES OF DAILY LIVING: ICD-10-CM

## 2024-03-20 PROCEDURE — 3044F HG A1C LEVEL LT 7.0%: CPT | Mod: CPTII,,, | Performed by: HOSPITALIST

## 2024-03-20 PROCEDURE — 99395 PREV VISIT EST AGE 18-39: CPT | Mod: S$PBB,,, | Performed by: HOSPITALIST

## 2024-03-20 PROCEDURE — 1159F MED LIST DOCD IN RCRD: CPT | Mod: CPTII,,, | Performed by: HOSPITALIST

## 2024-03-20 PROCEDURE — 99999 PR PBB SHADOW E&M-EST. PATIENT-LVL III: CPT | Mod: PBBFAC,,, | Performed by: HOSPITALIST

## 2024-03-20 PROCEDURE — 3078F DIAST BP <80 MM HG: CPT | Mod: CPTII,,, | Performed by: HOSPITALIST

## 2024-03-20 PROCEDURE — 1160F RVW MEDS BY RX/DR IN RCRD: CPT | Mod: CPTII,,, | Performed by: HOSPITALIST

## 2024-03-20 PROCEDURE — 99213 OFFICE O/P EST LOW 20 MIN: CPT | Mod: PBBFAC,PO | Performed by: HOSPITALIST

## 2024-03-20 PROCEDURE — 3074F SYST BP LT 130 MM HG: CPT | Mod: CPTII,,, | Performed by: HOSPITALIST

## 2024-03-20 NOTE — PROGRESS NOTES
Subjective:     @Patient ID: Medina Hernandez is a 38 y.o. female.    Chief Complaint: Annual Exam    HPI    39 yo F with cerebral palsy, muscle weakness, impaired mobiltiy, hx of toe fracture presents for annual exam:         Lipid disorders/ASCVD risk (ages >/= 45 or >/= 20 if increased risk ): ordered   DM (>45y yearly or if obese, HTN): A1c  ordered     Eye exam: n/a   Breast Cancer (40-50y discretion of pt, 50-74y every 1-2 years): Mammogram n/a   Cervical Cancer (Pap Smear ages 21-65 every 3 years or Pap + HPV q5 years after 30 years of age): Done 2021  Colorectal Cancer (normal risk 50-75yr): Colonoscopy n/a      Vaccines:    Influenza (yearly): pt declines   Tetanus (every 10 yrs - 1st tdap): utd 2016.    PPSV23(>64yo or <65 w/ lung dz, smoking, DM) : n/a   PCV13 (> 65 or <65 w/ immunocompromised): n/a   Zoster (>61yo) n/a   Covid19: due for booster      Exercise: none   Diet: Vegetarian diet, does eat Halal meat    Review of Systems   Constitutional:  Negative for chills and fever.   HENT:  Negative for congestion and sore throat.    Eyes:  Negative for pain and visual disturbance.   Respiratory:  Negative for cough and shortness of breath.    Cardiovascular:  Negative for chest pain and leg swelling.   Gastrointestinal:  Negative for abdominal pain, nausea and vomiting.   Endocrine: Negative for polydipsia and polyuria.   Genitourinary:  Negative for difficulty urinating and dysuria.   Musculoskeletal:  Negative for arthralgias and back pain.   Skin:  Negative for rash and wound.   Neurological:  Positive for weakness (chronic). Negative for dizziness and headaches.   Psychiatric/Behavioral:  Negative for agitation and confusion.      Past medical history, surgical history, and family medical history reviewed and updated as appropriate.    Medications and allergies reviewed.     Objective:     Vitals:    03/20/24 1248   BP: (!) 100/56   BP Location: Left arm   Patient Position: Sitting   Pulse: 80   Resp: 18    Temp: 97.9 °F (36.6 °C)   SpO2: 100%     There is no height or weight on file to calculate BMI.  Physical Exam  Vitals reviewed.   Constitutional:       General: She is not in acute distress.     Appearance: She is well-developed.   HENT:      Head: Normocephalic and atraumatic.      Ears:      Comments: Cerumen b/l      Mouth/Throat:      Mouth: Mucous membranes are moist.      Pharynx: No oropharyngeal exudate.   Eyes:      General:         Right eye: No discharge.         Left eye: No discharge.      Conjunctiva/sclera: Conjunctivae normal.   Cardiovascular:      Rate and Rhythm: Normal rate and regular rhythm.      Heart sounds: No murmur heard.     No friction rub.   Pulmonary:      Effort: Pulmonary effort is normal.      Breath sounds: Normal breath sounds.   Abdominal:      General: Bowel sounds are normal. There is no distension.      Palpations: Abdomen is soft.      Tenderness: There is no abdominal tenderness. There is no guarding.   Musculoskeletal:      Cervical back: Normal range of motion and neck supple.      Right lower leg: No edema.      Left lower leg: No edema.      Comments: + BLE weakness   Lymphadenopathy:      Cervical: No cervical adenopathy.   Skin:     General: Skin is warm and dry.   Neurological:      Mental Status: She is alert and oriented to person, place, and time.   Psychiatric:         Mood and Affect: Mood normal.         Behavior: Behavior normal.         Lab Results   Component Value Date    WBC 3.78 (L) 03/13/2024    HGB 12.8 03/13/2024    HCT 38.4 03/13/2024     03/13/2024    CHOL 123 03/13/2024    TRIG 29 (L) 03/13/2024    HDL 45 03/13/2024    ALT 11 03/13/2024    AST 17 03/13/2024     03/13/2024    K 3.7 03/13/2024     03/13/2024    CREATININE 0.7 03/13/2024    BUN 9 03/13/2024    CO2 25 03/13/2024    TSH 1.310 03/13/2024    HGBA1C 4.8 03/13/2024       Assessment:     1. Encounter for preventive health examination    2. Cerebral palsy, unspecified type     3. Leg weakness, bilateral    4. Impaired mobility and activities of daily living      Plan:   Medina was seen today for annual exam.    Diagnoses and all orders for this visit:    Encounter for preventive health examination  - stable. Reviewed lab results in clinic     Cerebral palsy, unspecified type  - stable. Continue f/u with PMR   -     Ambulatory referral/consult to Home Health; Future    Leg weakness, bilateral  -     Ambulatory referral/consult to Home Health; Future    Impaired mobility and activities of daily living  -     Ambulatory referral/consult to Home Health; Future      Rtc 1 year and prn     Ivett Cardoso MD  Internal Medicine    3/20/2024

## 2024-03-21 PROCEDURE — G0180 MD CERTIFICATION HHA PATIENT: HCPCS | Mod: ,,, | Performed by: HOSPITALIST

## 2024-04-02 ENCOUNTER — TELEPHONE (OUTPATIENT)
Dept: INTERNAL MEDICINE | Facility: CLINIC | Age: 39
End: 2024-04-02
Payer: MEDICAID

## 2024-04-02 DIAGNOSIS — R29.898 LEG WEAKNESS, BILATERAL: ICD-10-CM

## 2024-04-02 DIAGNOSIS — G80.9 CEREBRAL PALSY, UNSPECIFIED TYPE: Primary | ICD-10-CM

## 2024-04-02 NOTE — TELEPHONE ENCOUNTER
Please place orders; home health is calling in  regards to a verbal order for a  evaluation   And a order faxed to [place] [employer] for a transfer tub bench.    Verbal orders given to Jennifer hudson social work jorge sheppard.

## 2024-04-02 NOTE — TELEPHONE ENCOUNTER
----- Message from Maya Fuentes sent at 4/2/2024  4:12 PM CDT -----  Contact: Jennifer...OT mando/[facility]  [phone]  Jennifer is calling in  regards to a verbal order for a  evaluation and order faxed to [place] [employer] for a transfer tub bench.                Thank you

## 2024-04-04 NOTE — TELEPHONE ENCOUNTER
The patient was called and a message left to call the office back with a fax # to [employer].

## 2024-04-05 ENCOUNTER — TELEPHONE (OUTPATIENT)
Dept: INTERNAL MEDICINE | Facility: CLINIC | Age: 39
End: 2024-04-05
Payer: MEDICAID

## 2024-04-05 NOTE — TELEPHONE ENCOUNTER
----- Message from Luisa Sullivan sent at 4/5/2024  4:24 PM CDT -----  Contact: Cheryl gilmore/ [facility]   Cheryl gilmore/ [facility] is calling in regards to needing verbal orders to move the  eval to next week. Please call and advise. Thank you

## 2024-04-06 ENCOUNTER — PATIENT MESSAGE (OUTPATIENT)
Dept: INTERNAL MEDICINE | Facility: CLINIC | Age: 39
End: 2024-04-06
Payer: MEDICAID

## 2024-04-08 ENCOUNTER — TELEPHONE (OUTPATIENT)
Dept: INTERNAL MEDICINE | Facility: CLINIC | Age: 39
End: 2024-04-08
Payer: MEDICAID

## 2024-04-08 NOTE — TELEPHONE ENCOUNTER
----- Message from Luda Merida sent at 4/8/2024 11:48 AM CDT -----  Contact: [phone] Patient  Patient is returning a phone call.  Who left a message for the patient: Ynes   Does patient know what this is regarding:  order for tub bench for shower  Would you like a call back, or a response through your [facility] portal?:   call back  Comments:

## 2024-04-18 ENCOUNTER — TELEPHONE (OUTPATIENT)
Dept: INTERNAL MEDICINE | Facility: CLINIC | Age: 39
End: 2024-04-18
Payer: MEDICAID

## 2024-04-18 NOTE — TELEPHONE ENCOUNTER
Spoke with Jennifer, pt fell out of chair, bumped her head with a bruisE over right eye, pt reports feeling fine, no dizziness or headache, no vision problems reported at this time. Physical therapy nurse was there to assist in care. Let her know to tell pt if problems develop to be evaluated by the ER.

## 2024-04-18 NOTE — TELEPHONE ENCOUNTER
----- Message from Aronkomalmagali Díaz sent at 4/18/2024 11:05 AM CDT -----  Contact: prakash @ [facility] [phone]  Would like to receive medical advice.    Would they like a call back or a response via [facility]:  call back    Additional information:  Calling to get verbal orders to continue to give occupational therapy for the above pt for 1 time a week for 2 additional weeks

## 2024-04-18 NOTE — TELEPHONE ENCOUNTER
----- Message from Jamila Fuentes sent at 4/18/2024  1:47 PM CDT -----  Contact: Jennifer @[facility] [phone]  Would like to receive medical advice.    Would they like a call back or a response via [facility]:  call back    Additional information:  Calling to report had a fall in pt home on today 4/18/24 and no injuries reported. Jennifer states pt bumped forehead above right eye.

## 2024-04-18 NOTE — TELEPHONE ENCOUNTER
Need approval to give VO for pt to continue to give occupational therapy for the above pt for 1 time a week for 2 additional weeks .

## 2024-04-22 NOTE — TELEPHONE ENCOUNTER
VO given to Jennifer with physical therapy, and she wanted to know about tub, let her know orders has been sent to [facility].

## 2024-04-30 ENCOUNTER — DOCUMENT SCAN (OUTPATIENT)
Dept: HOME HEALTH SERVICES | Facility: HOSPITAL | Age: 39
End: 2024-04-30
Payer: MEDICAID

## 2024-05-06 ENCOUNTER — HOSPITAL ENCOUNTER (EMERGENCY)
Facility: HOSPITAL | Age: 39
Discharge: HOME | End: 2024-05-06
Attending: EMERGENCY MEDICINE
Payer: MEDICAID

## 2024-05-06 VITALS
DIASTOLIC BLOOD PRESSURE: 67 MMHG | TEMPERATURE: 98 F | HEART RATE: 89 BPM | OXYGEN SATURATION: 100 % | RESPIRATION RATE: 18 BRPM | WEIGHT: 105 LBS | SYSTOLIC BLOOD PRESSURE: 103 MMHG | BODY MASS INDEX: 18.6 KG/M2

## 2024-05-06 DIAGNOSIS — S00.03XA CONTUSION OF SCALP, INITIAL ENCOUNTER: ICD-10-CM

## 2024-05-06 DIAGNOSIS — S01.01XA LACERATION OF SCALP, INITIAL ENCOUNTER: ICD-10-CM
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
## 2024-05-06 PROCEDURE — 12011 RPR F/E/E/N/L/M 2.5 CM/<: CPT

## 2024-05-06 PROCEDURE — 99283 EMERGENCY DEPT VISIT LOW MDM: CPT | Mod: 25

## 2024-05-06 RX ORDER — LIDOCAINE HYDROCHLORIDE 10 MG/ML
10 INJECTION, SOLUTION EPIDURAL; INFILTRATION; INTRACAUDAL; PERINEURAL
Status: DISCONTINUED | OUTPATIENT
Start: 2024-05-06 | End: 2024-05-06

## 2024-05-06 NOTE — ED PROVIDER NOTES
"Encounter Date: 2024       History     Chief Complaint   Patient presents with    Fall     Pt has hx of CP and tripped over foot and fell, and hit head on ground,  bleeding controled.+ abrasion noted to left side of forehead      38-year-old female, Hx of CP, presents to ED with concern of head injury that occurred today.  Patient reports she was attempting to transition to her walker when she tripped and fell, causing impact to frontal scalp against ground.  No LOC.  No use of blood thinners.  Bleeding controlled pressure.  She does report mild headache rated as severity 6/10.  Headache is not "worst headache ever".  No visual changes, nausea or vomiting.  She has not taken any medications for her symptoms.  No other acute complaints at this time    The history is provided by the patient.     Review of patient's allergies indicates:  No Known Allergies  Past Medical History:   Diagnosis Date    Cerebral palsy     Gait abnormality     Low blood pressure      Past Surgical History:   Procedure Laterality Date     SECTION      FLUOROSCOPIC URODYNAMIC STUDY N/A 2023    Procedure: URODYNAMIC STUDY, FLUOROSCOPIC;  Surgeon: Bev Davalos MD;  Location: [address];  Service: Urology;  Laterality: N/A;    LAPAROSCOPIC SALPINGECTOMY Bilateral 7/10/2020    Procedure: SALPINGECTOMY, LAPAROSCOPIC;  Surgeon: Rosa Mcclendon MD;  Location: [facility];  Service: OB/GYN;  Laterality: Bilateral;  video confirmed  CW     No family history on file.  Social History     Tobacco Use    Smoking status: Never     Passive exposure: Never    Smokeless tobacco: Never   Substance Use Topics    Alcohol use: No    Drug use: No     Review of Systems   Eyes:  Negative for visual disturbance.   Gastrointestinal:  Negative for nausea and vomiting.   Musculoskeletal:  Negative for back pain and neck pain.   Skin:  Positive for wound.   Neurological:  Positive for headaches. Negative for dizziness, syncope and " light-headedness.       Physical Exam     Initial Vitals [05/06/24 1604]   BP Pulse Resp Temp SpO2   103/67 89 18 98 °F (36.7 °C) 100 %      MAP       --         Physical Exam    Vitals reviewed.  Constitutional: Vital signs are normal. She is cooperative. She does not have a sickly appearance. She does not appear ill. No distress.   HENT:   Head: Normocephalic.       1.5 cm laceration to left frontal scalp.  Small underlying hematoma but no other palpable deformities.  Bleeding controlled.   Eyes: EOM are normal.   Neck:   Normal range of motion.  Musculoskeletal:      Cervical back: Normal range of motion. No spinous process tenderness or muscular tenderness.     Neurological: She is alert and oriented to person, place, and time. GCS eye subscore is 4. GCS verbal subscore is 5. GCS motor subscore is 6.   Psychiatric: She has a normal mood and affect. Her speech is normal and behavior is normal.         ED Course   Lac Repair    Date/Time: 5/6/2024 5:12 PM    Performed by: Amos Amato PA-C  Authorized by: Amos Amato PA-C    Consent:     Consent obtained:  Verbal    Consent given by:  Patient    Risks discussed:  Infection and poor cosmetic result  Universal protocol:     Patient identity confirmed:  Verbally with patient  Anesthesia:     Anesthesia method:  None  Laceration details:     Location:  Scalp    Scalp location:  Frontal    Length (cm):  1.5  Exploration:     Hemostasis achieved with:  Cautery  Treatment:     Area cleansed with:  Saline    Irrigation solution:  Sterile water    Irrigation method:  Syringe    Debridement:  None    Undermining:  None  Skin repair:     Repair method:  Tissue adhesive  Approximation:     Approximation:  Close  Repair type:     Repair type:  Simple  Post-procedure details:     Dressing:  Open (no dressing)    Procedure completion:  Tolerated well, no immediate complications    Labs Reviewed - No data to display       Imaging Results    None          Medications - No  data to display    Medical Decision Making  Patient presents with concern of laceration to frontal scalp after trip and fall that occurred just prior to arrival.  No LOC.  No use of blood thinners.  Afebrile with vitals WNL.  1.5 cm linear laceration noted to left frontal scalp.  Bleeding controlled.    DDx:  Including but not limited to laceration, abrasion, less likely skull fracture,diffuse axonal injury, countercoup injury, intracranial hemorrhage such as subdural hematoma, subarachnoid hemorrhage or epidural hematoma, cerebral contusion, soft tissue injury, paraspinal or spinal injury      Risk  Prescription drug management.               ED Course as of 05/06/24 1719   Mon May 06, 2024   1715 Lengthy discussion was made with patient.  Gonz[place] head CT reviewed; lower concern for acute cranial fracture or intracranial bleed.  Wound closure options were discussed including Steri-Strips verses Dermabond versus sutures.  Patient agrees with Dermabond.  Wound site was cleaned then primary closed.  Patient tolerated well.  Encouraged to keep wound site clean and dry, monitor symptoms closely, high fall precautions, close outpatient follow-up.  ED return precautions were discussed.  Patient states her understanding and agrees with plan. [KS]      ED Course User Index  [KS] Amos Amato PA-C                           Clinical Impression:  Final diagnoses:  [W19.XXXA] Fall, initial encounter (Primary)  [S00.03XA] Contusion of scalp, initial encounter  [S01.01XA] Laceration of scalp, initial encounter          ED Disposition Condition    Discharge Stable          ED Prescriptions    None       Follow-up Information       Follow up With Specialties Details Why Contact Info    Ivett Cardoso MD Internal Medicine  For wound re-check [address] [facility] [address]  [phone]               Amos Amato PA-C  05/06/24 4525

## 2024-05-09 ENCOUNTER — DOCUMENT SCAN (OUTPATIENT)
Dept: HOME HEALTH SERVICES | Facility: HOSPITAL | Age: 39
End: 2024-05-09
Payer: MEDICAID

## 2024-05-10 ENCOUNTER — EXTERNAL HOME HEALTH (OUTPATIENT)
Dept: HOME HEALTH SERVICES | Facility: HOSPITAL | Age: 39
End: 2024-05-10
Payer: MEDICAID

## 2024-05-13 ENCOUNTER — DOCUMENT SCAN (OUTPATIENT)
Dept: HOME HEALTH SERVICES | Facility: HOSPITAL | Age: 39
End: 2024-05-13
Payer: MEDICAID

## 2024-06-10 ENCOUNTER — TELEPHONE (OUTPATIENT)
Dept: INTERNAL MEDICINE | Facility: CLINIC | Age: 39
End: 2024-06-10
Payer: MEDICAID

## 2024-06-10 ENCOUNTER — TELEPHONE (OUTPATIENT)
Dept: UROGYNECOLOGY | Facility: CLINIC | Age: 39
End: 2024-06-10
Payer: MEDICAID

## 2024-06-10 NOTE — TELEPHONE ENCOUNTER
----- Message from Maya Fuentes sent at 6/10/2024 12:37 PM CDT -----  Contact: [phone]  Pt is requesting a callback to schedule a f/u appt. There are no available appts listed. Please call.                 Thank you

## 2024-06-10 NOTE — TELEPHONE ENCOUNTER
----- Message from Danielle Frazier sent at 6/10/2024  2:21 PM CDT -----  Contact: [phone]  Patient called, requested a courtesy call from Dr Cardoso's office in regards paper work - medical disability. Thank you.

## 2024-06-10 NOTE — TELEPHONE ENCOUNTER
Informed pt she has an follow up appt scheduled Wed 8/28/24 at 2 pm.  Pt voiced understanding and call ended.

## 2024-06-11 ENCOUNTER — TELEPHONE (OUTPATIENT)
Dept: UROGYNECOLOGY | Facility: CLINIC | Age: 39
End: 2024-06-11
Payer: MEDICAID

## 2024-06-11 NOTE — TELEPHONE ENCOUNTER
I called this patient and she explained she is no longer self cathing. She has her  doing it for her at home- but she is going out of town and her  is not going with her. With that being said, she is reporting she needs a letter for when she goes to the doctor or the hospital.   Sent to Анна Moreno NP    ----- Message from Dominic Mcdonnell sent at 6/11/2024  1:05 PM CDT -----  Regarding: Self  [phone]  Type: Patient Call Back    Who called: Self     What is the request in detail: called in regards to trying to get a letter from the doctor. Would like a call back.     Can the clinic reply by [facility]? No     Would the patient rather a call back or a response via [facility]? Call back     Best call back number: [phone]     Additional Information:    Thank you.

## 2024-06-11 NOTE — TELEPHONE ENCOUNTER
Spoke to the patient. She is stating that you completed work for her previously.  She stated that you were to send additional information.    I will need to discuss with Dr Cardoso.

## 2024-06-11 NOTE — TELEPHONE ENCOUNTER
----- Message from Melanie Perez RN sent at 6/11/2024  4:39 PM CDT -----  Regarding: FW: Self  [phone]    ----- Message -----  From: Анна Moreno NP  Sent: 6/11/2024   4:16 PM CDT  To: Melanie Perez RN  Subject: RE: Self  [phone]                           What does she need the letter to say? Not sure what she is looking for.    Let me know,   Анна  ----- Message -----  From: Melanie Perez RN  Sent: 6/11/2024   1:34 PM CDT  To: Анна Moreno NP  Subject: FW: Self  [phone]                           Hi!!!  I called this patient and she explained she is no longer self cathing. She has her  doing it for her at home- but she is going out of town and her  is not going with her. With that being said, she is reporting she needs a letter for when she goes to the doctor or the hospital.   Please advise.   Melanie  ----- Message -----  From: Dominic Mcdonnell  Sent: 6/11/2024   1:06 PM CDT  To: Josh Jj Staff  Subject: Self  [phone]                               Type: Patient Call Back    Who called: Self     What is the request in detail: called in regards to trying to get a letter from the doctor. Would like a call back.     Can the clinic reply by [facility]? No     Would the patient rather a call back or a response via [facility]? Call back     Best call back number: [phone]     Additional Information:    Thank you.

## 2024-06-11 NOTE — TELEPHONE ENCOUNTER
Explained to patient that I could not write a letter for her to walk in to an urgent care or ER for her to be catheterized while travelling. The sites would need to do an evaluation. She does not have anyone travelling with her that is willing to do it. Анна Moreno, WILDERP-BC

## 2024-06-11 NOTE — TELEPHONE ENCOUNTER
Returned pt call no answer, Left voice message for pt to give the office a call back at [phone].

## 2024-06-11 NOTE — TELEPHONE ENCOUNTER
----- Message from Trina Fuentes sent at 6/11/2024 12:37 PM CDT -----  Contact: [phone]  [email or web address]     Patient is calling for Medical Advice regarding: Patient would like a call back to discuss her needing a letter for when she is going on vacation.       Comments: Please call.

## 2024-06-12 NOTE — TELEPHONE ENCOUNTER
I spoke to Dr Cardoso. She would like for me to talk with the patient to see if home health has started   Called the patient, no answer, message left. Will call back tomorrow.

## 2024-06-13 NOTE — TELEPHONE ENCOUNTER
Still needing to find out if the patient is currently being seen by Home Health.  Will call back tomorrow.

## 2024-06-14 NOTE — TELEPHONE ENCOUNTER
Previous message    Called pt she stated she waiting on  to send over paper work to  at [phone]

## 2024-06-14 NOTE — TELEPHONE ENCOUNTER
Called pt  and Lvm to give our office a call back. Need to know what all pt would need to be faxed over for HH.

## 2024-06-17 ENCOUNTER — TELEPHONE (OUTPATIENT)
Dept: PHYSICAL MEDICINE AND REHAB | Facility: CLINIC | Age: 39
End: 2024-06-17
Payer: MEDICAID

## 2024-06-17 DIAGNOSIS — G11.9 ATAXIA DUE TO CEREBELLAR DEGENERATION: ICD-10-CM

## 2024-06-17 NOTE — TELEPHONE ENCOUNTER
----- Message from Tyra Angelo sent at 6/17/2024 11:11 AM CDT -----  Regarding: earlier appt date  Contact: Pt @ [phone]  Pt is calling to speak to someone in the office to r/s her appt date she is currently scheduled for; no available appts in Epic. Please call to advise. Thanks.             Additional Info:  Pt wanted to know if her appt can be r/s to 8/28 at 11:00am or 12:00pm instead of 10/8/24

## 2024-06-17 NOTE — TELEPHONE ENCOUNTER
----- Message from Luda Merida sent at 6/12/2024 12:53 PM CDT -----  Contact: [phone] Patient  No blue slot available to schedule an appointment for the patient.  Patient is established with which PCP: Dr CARLI Cardoso  Reason for the visit: pt states she missed her appt on 06/04/24 and needs to schedule a new appt.   Would the patient like a call back, or a response through their [facility] portal?:  call back to     Pt has a referral in the system for   M54.50,G89.29 (ICD-10-CM) - Chronic low back pain, unspecified back pain laterality, unspecified whether sciatica present  G80.9 (ICD-10-CM) - Cerebral palsy, unspecified type

## 2024-06-17 NOTE — TELEPHONE ENCOUNTER
----- Message from Tyra Angelo sent at 6/17/2024 11:11 AM CDT -----  Regarding: earlier appt date  Contact: Pt @ [phone]  Pt is calling to speak to someone in the office to r/s her appt date she is currently scheduled for; no available appts in Epic. Please call to advise. Thanks.             Additional Info:  Pt wanted to know if her appt can be r/s to 8/28 at 11:00am or 12:00pm instead of 10/8/24

## 2024-06-18 RX ORDER — BACLOFEN 20 MG/1
20 TABLET ORAL 3 TIMES DAILY
Qty: 90 TABLET | Refills: 2 | Status: SHIPPED | OUTPATIENT
Start: 2024-06-18

## 2024-07-01 ENCOUNTER — TELEPHONE (OUTPATIENT)
Dept: UROGYNECOLOGY | Facility: CLINIC | Age: 39
End: 2024-07-01
Payer: MEDICAID

## 2024-07-01 NOTE — TELEPHONE ENCOUNTER
Pt informed me that she can't go to the bathroom and has been to the ER twice in the past 10 days for a catheter insertion to pee. Requesting a sooner appt but none are available so she's been added to the waitlist.     ----- Message from Mallory Medina sent at 7/1/2024  9:28 AM CDT -----  Regarding: Sooner appt  Type: Patient Call Back    Who called:    What is the request in detail: p/t is calling b/c she would like a sooner appt than 08/28. Please call to assist.     Can the clinic reply by [facility]?    Would the patient rather a call back or a response via [facility]?     Best call back number:[phone] (home)       Additional Information:

## 2024-07-15 ENCOUNTER — TELEPHONE (OUTPATIENT)
Dept: UROGYNECOLOGY | Facility: CLINIC | Age: 39
End: 2024-07-15
Payer: MEDICAID

## 2024-07-15 NOTE — TELEPHONE ENCOUNTER
Pt was rescheduled to Mon 8/5/24 at 1 pm. Pt was advised to contact office to reschedule if she can't make it to appt. Pt voiced understanding and call ended.

## 2024-07-15 NOTE — TELEPHONE ENCOUNTER
----- Message from Mallory Adam sent at 7/15/2024  3:39 PM CDT -----  Regarding: Sooner appt  Type: Patient Call Back    Who called:    What is the request in detail: p/t calling to see if it is possible she can be seen sooner than 07/29 appt. System would not let me reschedule. Please call to assist.     [facility] Yes     Would the patient rather a call back or a response via [facility]?     Best call back number: [phone] (home)       Additional Information:

## 2024-07-22 ENCOUNTER — TELEPHONE (OUTPATIENT)
Dept: INTERNAL MEDICINE | Facility: CLINIC | Age: 39
End: 2024-07-22
Payer: MEDICAID

## 2024-07-22 NOTE — TELEPHONE ENCOUNTER
----- Message from Jerome Pacheco sent at 7/22/2024 10:15 AM CDT -----  Contact: [phone] Ext#[phone]@[email or web address]  Good morning   patient   would like a call back to discuss patient getting order for home health. Please call [facility] [facility] to advise [phone] Ext#[phone]

## 2024-07-29 ENCOUNTER — TELEPHONE (OUTPATIENT)
Dept: UROGYNECOLOGY | Facility: CLINIC | Age: 39
End: 2024-07-29
Payer: MEDICAID

## 2024-07-29 NOTE — TELEPHONE ENCOUNTER
Pt. Was called on 07/29/24 @10:06 am, pt. Didn't answer a detailed voicemail was left. Call ended.

## 2024-07-29 NOTE — TELEPHONE ENCOUNTER
----- Message from Afia Rendon sent at 7/29/2024  9:33 AM CDT -----      Name of Who is Calling: KI BAIG [[id number]]      What is the request in detail: Medicaid pt called regarding getting her appt moved to a Wednesday if possible.Please contact to further discuss and advise.          Can the clinic reply by [facility]: Y      What Number to Call Back if not in [facility]: [phone]

## 2024-07-31 NOTE — TELEPHONE ENCOUNTER
Message left for patient to call to get appointment schedule for a face to face for home health orders

## 2024-08-03 ENCOUNTER — HOSPITAL ENCOUNTER (EMERGENCY)
Facility: HOSPITAL | Age: 39
Discharge: HOME | End: 2024-08-03
Attending: EMERGENCY MEDICINE
Payer: MEDICAID

## 2024-08-03 VITALS
SYSTOLIC BLOOD PRESSURE: 110 MMHG | OXYGEN SATURATION: 100 % | DIASTOLIC BLOOD PRESSURE: 65 MMHG | RESPIRATION RATE: 20 BRPM | TEMPERATURE: 98 F | HEART RATE: 77 BPM | BODY MASS INDEX: 20.38 KG/M2 | WEIGHT: 115 LBS | HEIGHT: 63 IN

## 2024-08-03 DIAGNOSIS — R33.8 ACUTE URINARY RETENTION: Primary | ICD-10-CM

## 2024-08-03 DIAGNOSIS — N30.00 ACUTE CYSTITIS WITHOUT HEMATURIA: ICD-10-CM

## 2024-08-03 LAB
B-HCG UR QL: NEGATIVE
BACTERIA #/AREA URNS HPF: NORMAL /HPF
BILIRUB UR QL STRIP: NEGATIVE
CLARITY UR: CLEAR
COLOR UR: YELLOW
CTP QC/QA: YES
GLUCOSE UR QL STRIP: NEGATIVE
HGB UR QL STRIP: ABNORMAL
KETONES UR QL STRIP: NEGATIVE
LEUKOCYTE ESTERASE UR QL STRIP: NEGATIVE
MICROSCOPIC COMMENT: NORMAL
NITRITE UR QL STRIP: POSITIVE
PH UR STRIP: 8 [PH] (ref 5–8)
PROT UR QL STRIP: NEGATIVE
RBC #/AREA URNS HPF: 0 /HPF (ref 0–4)
SP GR UR STRIP: 1.01 (ref 1–1.03)
SQUAMOUS #/AREA URNS HPF: 0 /HPF
URN SPEC COLLECT METH UR: ABNORMAL
UROBILINOGEN UR STRIP-ACNC: NEGATIVE EU/DL
WBC #/AREA URNS HPF: 2 /HPF (ref 0–5)
YEAST URNS QL MICRO: NORMAL

## 2024-08-03 PROCEDURE — 51798 US URINE CAPACITY MEASURE: CPT

## 2024-08-03 PROCEDURE — 25000003 PHARM REV CODE 250: Performed by: NURSE PRACTITIONER

## 2024-08-03 PROCEDURE — 99283 EMERGENCY DEPT VISIT LOW MDM: CPT | Mod: 25

## 2024-08-03 PROCEDURE — 51702 INSERT TEMP BLADDER CATH: CPT | Mod: 59

## 2024-08-03 PROCEDURE — 81025 URINE PREGNANCY TEST: CPT | Performed by: NURSE PRACTITIONER

## 2024-08-03 PROCEDURE — 81000 URINALYSIS NONAUTO W/SCOPE: CPT | Performed by: NURSE PRACTITIONER

## 2024-08-03 RX ORDER — ACETAMINOPHEN 325 MG/1
650 TABLET ORAL
Status: COMPLETED | OUTPATIENT
Start: 2024-08-03 | End: 2024-08-03

## 2024-08-03 RX ORDER — NITROFURANTOIN 25; 75 MG/1; MG/1
100 CAPSULE ORAL 2 TIMES DAILY
Qty: 10 CAPSULE | Refills: 0 | Status: SHIPPED | OUTPATIENT
Start: 2024-08-03 | End: 2024-08-08

## 2024-08-03 RX ADMIN — ACETAMINOPHEN 650 MG: 325 TABLET ORAL at 01:08

## 2024-08-03 NOTE — ED PROVIDER NOTES
Encounter Date: 8/3/2024       History     Chief Complaint   Patient presents with    Urinary Retention     States bladder is full and she is unable to urinate. Believes she has a UTI. Presents awake, alert, requesting indwelling catheter. Denies fever, abdominal pain. Strong smell of urine noted. H/o cerebral palsy     38-year-old female presents to the ED for acute urinary retention. The patient has history of CP and neurogenic bladder and frequently requires Georges catheterization.  She last urinated normally last night and reports very minimal output today.  She reports lower abdominal distention.  No fever or dysuria prior to the onset of retention.  She states that she has required in and out catheterization in the past to help with acute urinary retention that is usually performed by her .  However, her  is at work and not available at this time.  No other complaints at this time.    The history is provided by the patient and medical records.     Review of patient's allergies indicates:  No Known Allergies  Past Medical History:   Diagnosis Date    Cerebral palsy     Gait abnormality     Low blood pressure      Past Surgical History:   Procedure Laterality Date     SECTION      FLUOROSCOPIC URODYNAMIC STUDY N/A 2023    Procedure: URODYNAMIC STUDY, FLUOROSCOPIC;  Surgeon: Bev Davalos MD;  Location: [address];  Service: Urology;  Laterality: N/A;    LAPAROSCOPIC SALPINGECTOMY Bilateral 7/10/2020    Procedure: SALPINGECTOMY, LAPAROSCOPIC;  Surgeon: Rosa Mcclendon MD;  Location: [facility];  Service: OB/GYN;  Laterality: Bilateral;  video confirmed  CW     No family history on file.  Social History     Tobacco Use    Smoking status: Never     Passive exposure: Never    Smokeless tobacco: Never   Substance Use Topics    Alcohol use: No    Drug use: No     Review of Systems   Constitutional:  Negative for chills, fatigue and fever.   HENT:  Negative for congestion.     Respiratory:  Negative for shortness of breath.    Cardiovascular:  Negative for chest pain.   Gastrointestinal:  Negative for abdominal pain, diarrhea, nausea and vomiting.   Genitourinary:  Positive for decreased urine volume and difficulty urinating. Negative for dysuria, hematuria, urgency, vaginal bleeding and vaginal discharge.   Musculoskeletal:  Negative for back pain.   Skin:  Negative for rash.   Allergic/Immunologic: Negative for immunocompromised state.   Neurological:  Negative for weakness and headaches.   Psychiatric/Behavioral:  Negative for confusion.    All other systems reviewed and are negative.      Physical Exam     Initial Vitals   BP Pulse Resp Temp SpO2   08/03/24 1119 08/03/24 1119 08/03/24 1149 08/03/24 1119 08/03/24 1119   (!) 126/90 88 17 98.3 °F (36.8 °C) 99 %      MAP       --                Physical Exam    Nursing note and vitals reviewed.  Constitutional: Vital signs are normal. She appears well-developed and well-nourished. She is active and cooperative.  Non-toxic appearance. She does not have a sickly appearance. She does not appear ill.   HENT:   Head: Normocephalic and atraumatic.   Eyes: Conjunctivae and EOM are normal.   Neck: Neck supple.   Normal range of motion.  Cardiovascular:  Normal rate and regular rhythm.           Pulmonary/Chest: Effort normal and breath sounds normal.   Abdominal: Abdomen is soft. Bowel sounds are normal. There is abdominal tenderness in the suprapubic area.   Distended bladder There is no rebound and no guarding.   Musculoskeletal:      Cervical back: Normal range of motion and neck supple.     Neurological: She is alert and oriented to person, place, and time. She has normal strength. GCS eye subscore is 4. GCS verbal subscore is 5. GCS motor subscore is 6.   Skin: Skin is warm, dry and intact. No rash noted.   Psychiatric: She has a normal mood and affect. Her speech is normal and behavior is normal. Judgment and thought content normal.  Cognition and memory are normal.         ED Course   Procedures  Labs Reviewed   URINALYSIS, REFLEX TO URINE CULTURE - Abnormal       Result Value    Specimen UA Urine, Clean Catch      Color, UA Yellow      Appearance, UA Clear      pH, UA 8.0      Specific Gravity, UA 1.010      Protein, UA Negative      Glucose, UA Negative      Ketones, UA Negative      Bilirubin (UA) Negative      Occult Blood UA 1+ (*)     Nitrite, UA Positive (*)     Urobilinogen, UA Negative      Leukocytes, UA Negative      Narrative:     Specimen Source->Urine   URINALYSIS MICROSCOPIC    RBC, UA 0      WBC, UA 2      Bacteria None      Yeast, UA None      Squam Epithel, UA 0      Microscopic Comment SEE COMMENT      Narrative:     Specimen Source->Urine   POCT URINE PREGNANCY    POC Preg Test, Ur Negative       Acceptable Yes            Imaging Results    None          Medications   acetaminophen tablet 650 mg (650 mg Oral Given 8/3/24 1306)     Medical Decision Making  38-year-old female with past medical history of CP and neurogenic bladder presents to the ED for acute urinary retention today.  She last urinated last night.  She reports very minimal output today.  No fever or dysuria prior to the onset of retention.  The patient has required Georges catheterization in the past for similar symptoms.  The patient is well-appearing.  Vitals stable.  She has lower abdominal distention and distended bladder.  On bedside bladder scan the patient had over 700 mL of urine in her bladder. Georges cath was placed and light straw colored urine was removed.  No blood noted.  The patient then requested that the Georges catheter be removed.  She reports that her  usually does a straight in and out catheterization at home and he will be home from work later today to help her if she has urinary retention again.  The patient was offered blood work but she declined.  She states that she normally takes Tylenol occasionally when like 1 now  "for "feeling tired. "UA does show infection and so culture was sent.  She is stable for outpatient treatment for acute cystitis.  She has no fever or tachycardia to suggest pyelonephritis.  She is tolerating oral fluids and feels comfortable with discharge.    Pt to follow up with PCP within 3 days.  I reviewed strict return precautions. In addition, pt is to return to the ED if condition changes, progresses, or if there are any concerns.  Pt verbalized understanding, compliance, and agreement with the treatment plan.        Amount and/or Complexity of Data Reviewed  Labs: ordered.    Risk  OTC drugs.  Prescription drug management.                                      Clinical Impression:  Final diagnoses:  [R33.8] Acute urinary retention (Primary)  [N30.00] Acute cystitis without hematuria          ED Disposition Condition    Discharge Stable          ED Prescriptions       Medication Sig Dispense Start Date End Date Auth. Provider    nitrofurantoin, macrocrystal-monohydrate, (MACROBID) 100 MG capsule Take 1 capsule (100 mg total) by mouth 2 (two) times daily. for 5 days 10 capsule 8/3/2024 8/8/2024 Piedad Freitas FNP          Follow-up Information       Follow up With Specialties Details Why Contact Info    Ivett Cardoso MD Internal Medicine Schedule an appointment as soon as possible for a visit in 1 week  [address] [facility] [address]  [phone]      Your urologist  Schedule an appointment as soon as possible for a visit in 1 week               Piedad Freitas FNP  08/03/24 8646    "

## 2024-08-03 NOTE — DISCHARGE INSTRUCTIONS
Return to the ED if your condition changes, progresses, or if you have any concerns.      Thank you for choosing [facility]! We appreciate you coming to us for your medical care. We hope you feel better soon! Please come back to [facility] for all of your future medical needs.      Our goal in the emergency department is to always give you outstanding care and exceptional service. You may receive a survey by mail or e-mail in the next week regarding your experience in our ED. We would greatly appreciate your completing and returning the survey. Your feedback provides us with a way to recognize our staff who give very good care and it helps us learn how to improve when your experience was below our aspiration of excellence.      Sincerely,  ANGELES Laguna  [facility]

## 2024-08-05 DIAGNOSIS — G11.9 ATAXIA DUE TO CEREBELLAR DEGENERATION: ICD-10-CM

## 2024-08-05 DIAGNOSIS — R39.89 BLADDER PAIN: ICD-10-CM

## 2024-08-05 RX ORDER — BACLOFEN 20 MG/1
20 TABLET ORAL 3 TIMES DAILY
Qty: 90 TABLET | Refills: 2 | Status: SHIPPED | OUTPATIENT
Start: 2024-08-05

## 2024-08-05 RX ORDER — IBUPROFEN 600 MG/1
600 TABLET ORAL EVERY 6 HOURS PRN
Qty: 30 TABLET | Refills: 1 | Status: SHIPPED | OUTPATIENT
Start: 2024-08-05

## 2024-08-14 ENCOUNTER — OFFICE VISIT (OUTPATIENT)
Dept: UROGYNECOLOGY | Facility: CLINIC | Age: 39
End: 2024-08-14
Payer: MEDICAID

## 2024-08-14 VITALS
HEART RATE: 75 BPM | DIASTOLIC BLOOD PRESSURE: 62 MMHG | HEIGHT: 63 IN | BODY MASS INDEX: 20.37 KG/M2 | SYSTOLIC BLOOD PRESSURE: 105 MMHG

## 2024-08-14 DIAGNOSIS — Z12.4 ENCOUNTER FOR SCREENING FOR CERVICAL CANCER: Primary | ICD-10-CM

## 2024-08-14 DIAGNOSIS — N85.2 ENLARGED UTERUS: ICD-10-CM

## 2024-08-14 DIAGNOSIS — R39.15 URINARY URGENCY: ICD-10-CM

## 2024-08-14 PROCEDURE — 88175 CYTOPATH C/V AUTO FLUID REDO: CPT | Performed by: NURSE PRACTITIONER

## 2024-08-14 PROCEDURE — 1159F MED LIST DOCD IN RCRD: CPT | Mod: CPTII,,, | Performed by: NURSE PRACTITIONER

## 2024-08-14 PROCEDURE — 99999 PR PBB SHADOW E&M-EST. PATIENT-LVL III: CPT | Mod: PBBFAC,,, | Performed by: NURSE PRACTITIONER

## 2024-08-14 PROCEDURE — 3008F BODY MASS INDEX DOCD: CPT | Mod: CPTII,,, | Performed by: NURSE PRACTITIONER

## 2024-08-14 PROCEDURE — 51701 INSERT BLADDER CATHETER: CPT | Mod: S$PBB,,, | Performed by: NURSE PRACTITIONER

## 2024-08-14 PROCEDURE — 3074F SYST BP LT 130 MM HG: CPT | Mod: CPTII,,, | Performed by: NURSE PRACTITIONER

## 2024-08-14 PROCEDURE — 99213 OFFICE O/P EST LOW 20 MIN: CPT | Mod: PBBFAC | Performed by: NURSE PRACTITIONER

## 2024-08-14 PROCEDURE — 3078F DIAST BP <80 MM HG: CPT | Mod: CPTII,,, | Performed by: NURSE PRACTITIONER

## 2024-08-14 PROCEDURE — 99213 OFFICE O/P EST LOW 20 MIN: CPT | Mod: 25,S$PBB,, | Performed by: NURSE PRACTITIONER

## 2024-08-14 PROCEDURE — 51701 INSERT BLADDER CATHETER: CPT | Mod: PBBFAC | Performed by: NURSE PRACTITIONER

## 2024-08-14 PROCEDURE — 3044F HG A1C LEVEL LT 7.0%: CPT | Mod: CPTII,,, | Performed by: NURSE PRACTITIONER

## 2024-08-14 PROCEDURE — 87624 HPV HI-RISK TYP POOLED RSLT: CPT | Performed by: NURSE PRACTITIONER

## 2024-08-14 PROCEDURE — 1160F RVW MEDS BY RX/DR IN RCRD: CPT | Mod: CPTII,,, | Performed by: NURSE PRACTITIONER

## 2024-08-14 NOTE — PATIENT INSTRUCTIONS
1. Mixed urinary incontinence, urge > stress with post-void urgency:  --urine C&S pending  --URGE: STOP no longer taking vesicare Gemtessa was not covered    --STRESS:  Pessary vs. Sling.  --try to void every 2-3 hours.  --if you have not been able to urinate, please do self catheterization in 4 hours  --perform catheterization at least 4 times  daily  --needs letter / order for pads for the bed     2. Constipation:  --continue fiber supplement     3. Pelvic floor weakness/TENSION  --use vaginal valium twice daily as needed    4. Vulvar atrophy-- concerns for lichen  --twice weekly AM apply steroid ointment/cream:  Apply dime-sized amount with finger to vaginal opening, inner lips, and all external areas (including around anus) that are irritated.  DO NOT APPLY INTERNALLY.   --twice weekly PM apply estrogen cream.  Apply dime-sized amount with finger to vaginal opening, inner lips, and all external areas (including around anus) that are irritated. Also apply small amount inside vagina with finger (insert to knuckle).     5. Frequent UTIs (bladder infections):  --urine C&S today  --8/2021 urine C&S (clean cath) + staph aureus (may have been skin contaminant)  --If you feel like you have a UTI, please call our office so that we can place an order for you to drop off a urine specimen at the closest [facility] lab (we will arrange).                --We will call in antibiotics for you to start right after you drop off specimen.                --In this way, we can determine:                           1)  Do you have a UTI?                            2) If you have a UTI, is it sensitive to the antibiotics we prescribed?   --follow UTI prevention tips (see attached)  --control bowel movements/fecal cross-contamination  --change your liner pad every time you pull your pants on  --empty bladder before and after intercourse  --continue taking 1 probiotic pill (any with lactobacillus and/or acidophilus)  daily  --retroperitoneal ultrasound normal 04/2023)  --cystoscopy normal  but with moderate trabeculations 11/2021              ---with PV urgency, concern for OAB              6. Microscopic hematuria  --retroperitoneal ultrasound normal  --cystoscopy normal today but with moderate trabeculations               ---with PV urgency, concern for OAB      7. concern for neurogenic bladder  --have your  catheterize you 2 times daily  --has seen Anoop  --retroperitoneal ultrasound normal  --cmp --renal function normal    8.Follow up with urology as scheduled    9. Pap today  --takes 2-3 weeks for results    10.enlarged uterus  --pelvic ultrasound ordered    11. 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

## 2024-08-14 NOTE — PROGRESS NOTES
Urogyn follow up  05/24/2023  .  [facility]  [address]  [facility] [address]    Medina Hernandez  [id number]  1985      Medina Hernandez is a 38 y.o.  here for a urogyn follow up for urinary retention.    Last HPI from 09/24/2021  1)  Mixed urinary incontinence, urge > stress:    --+ urinary urgency  --not leaking urine     2. Constipation:  --taking fiber supplement.     3. Pelvic floor weakness  --has not started pelvic floor PT        4. Vulvar atrophy-- concerns for lichen  --using clobetasol and estrogen cream     5. Frequent UTIs (bladder infections):  --no current symptoms     6.microscopic hematuria  --6 rbc in cath specimen    02/09/2022  1)  Mixed urinary incontinence, urge > stress:    --+ urinary urgency/ UUI  --taking vesicare 5 mg daily--feels like she can not initiate void sometime  --voiding every hour during the day  --nocturia 2/ night--does not limit fluids  --1-2 pullups/ day  --minimally wet for the most part--occasionally soaked  2. Constipation:  --improved  --taking fiber supplement.  3. Pelvic floor weakness  --had eval  --needs to schedule follow up visit   4. Vulvar atrophy-- concerns for lichen  --using clobetasol and estrogen cream--has not been using   5. Frequent UTIs (bladder infections):  --no current symptoms  6.microscopic hematuria  --normal cysto 11/2021 01/25/2023  1. Mixed urinary incontinence, urge > stress with post-void urgency:  --reports having trouble urinating occasionally--occurs 1-2 times weekly  --denies urinary urgency  --taking  continue vesicare 5 mg daily.  --gemtessa was not covered .   2. Constipation:  --intermittent  3. Pelvic floor weakness/tension  --getting ready to start PT  4. Vulvar atrophy-- concerns for lichen  --no longer using clobetasol  5. Frequent UTIs (bladder infections):  --denies dysuria at this time  --taking cranberry pills  --8/2021 urine C&S (clean cath) + staph aureus (may have been skin  "contaminant)  --continue taking 1 probiotic pill (any with lactobacillus and/or acidophilus) daily  --retroperitoneal ultrasound normal   --cystoscopy normal  but with moderate trabeculations               ---with PV urgency, concern for OAB   6. Microscopic hematuria  --retroperitoneal ultrasound normal  --cystoscopy normal  but with moderate trabeculations               ---with PV urgency, concern for OAB     2023  Treated for uti last in 2023  Called today complaining of inability to urinate. Reports this has happened a few times this week.  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 King for eval, but she is requesting a female staff provider.  Denies constipation         Past Medical History:   Diagnosis Date    Cerebral palsy     Gait abnormality     Low blood pressure        Past Surgical History:   Procedure Laterality Date     SECTION      FLUOROSCOPIC URODYNAMIC STUDY N/A 2023    Procedure: URODYNAMIC STUDY, FLUOROSCOPIC;  Surgeon: Bev Davalos MD;  Location: [address];  Service: Urology;  Laterality: N/A;    LAPAROSCOPIC SALPINGECTOMY Bilateral 7/10/2020    Procedure: SALPINGECTOMY, LAPAROSCOPIC;  Surgeon: Rosa Mcclendon MD;  Location: [facility];  Service: OB/GYN;  Laterality: Bilateral;  video confirmed  CW       No family history on file.    Social History     Socioeconomic History    Marital status:     Number of children: 1   Tobacco Use    Smoking status: Never     Passive exposure: Never    Smokeless tobacco: Never   Substance and Sexual Activity    Alcohol use: No    Drug use: No    Sexual activity: Yes     Partners: Male     Birth control/protection: Condom, Implant       Current Outpatient Medications " "  Medication Sig Dispense Refill    acetaminophen (TYLENOL) 500 MG tablet Take 1-2 tablets (500-1,000 mg total) by mouth 3 (three) times daily as needed for Pain.  0    albuterol (PROVENTIL/VENTOLIN HFA) 90 mcg/actuation inhaler INHALE 1 TO 2 PUFFS INTO THE LUNGS EVERY 6 HOURS AS NEEDED 18 g 0    baclofen (LIORESAL) 20 MG tablet Take 1 tablet (20 mg total) by mouth 3 (three) times daily. 90 tablet 2    calcium carbonate (CALTRATE 600 ORAL) Take by mouth once daily.      clobetasol 0.05% (TEMOVATE) 0.05 % Oint Apply topically once daily. 30 g 3    cyanocobalamin (VITAMIN B-12) 500 MCG tablet Take 500 mcg by mouth once daily.      ibuprofen (ADVIL,MOTRIN) 600 MG tablet Take 1 tablet (600 mg total) by mouth every 6 (six) hours as needed for Pain (pain). 30 tablet 1    lidocaine HCL 2% (XYLOCAINE) 2 % jelly Apply topically 3 (three) times daily. Apply to affected area three times daily prn 30 mL 1     No current facility-administered medications for this visit.       Review of patient's allergies indicates:  No Known Allergies    Well woman:  Pap test: 09/2021 normal HPV negative History of abnormal paps: No.  History of STIs:  No  Mammogram: n/a  Colonoscopy n/a  DEXA:  n/a    ROS:  As per HPI.      Exam  /62 (BP Location: Left arm, Patient Position: Sitting, BP Method: Medium (Automatic))   Pulse 75   Ht 5' 3" (1.6 m)   BMI 20.37 kg/m²   General: alert and oriented, no acute distress  Respiratory: normal respiratory effort  Abd: soft, non-tender, distended-- bladder palpable    PELVIC EXAM:   VULVA: {details:217978::"normal appearing vulva with no masses, tenderness or lesions"},   VAGINA: {details:284389::"normal appearing vagina with normal color and discharge, no lesions"},   CERVIX: {details:617184::"normal appearing cervix without discharge or lesions"}, UTERUS: {details:857073::"uterus is normal size, shape, consistency and nontender"}, ADNEXA: {details:908494::"normal adnexa in size, nontender and no " "masses"},   RECTAL: {details:724592::"normal rectal, no masses"}      Pvr 210 mL    Impression  No diagnosis found.          We reviewed the above issues and discussed options for short-term versus long-term management of her problems.   Plan:      1. Mixed urinary incontinence, urge > stress with post-void urgency:  --urine C&S pending  --URGE: STOP no longer taking vesicare Gemtessa was not covered    --STRESS:  Pessary vs. Sling.  --try to void every 2-3 hours.  --if you have not been able to urinate, please do self catheterization in 4 hours  --perform catheterization at least 4 times  daily  --needs letter / order for pads for the bed     2. Constipation:  --continue fiber supplement     3. Pelvic floor weakness/TENSION  --use vaginal valium twice daily as needed    4. Vulvar atrophy-- concerns for lichen  --twice weekly AM apply steroid ointment/cream:  Apply dime-sized amount with finger to vaginal opening, inner lips, and all external areas (including around anus) that are irritated.  DO NOT APPLY INTERNALLY.   --twice weekly PM apply estrogen cream.  Apply dime-sized amount with finger to vaginal opening, inner lips, and all external areas (including around anus) that are irritated. Also apply small amount inside vagina with finger (insert to knuckle).     5. Frequent UTIs (bladder infections):  --urine C&S today  --8/2021 urine C&S (clean cath) + staph aureus (may have been skin contaminant)  --If you feel like you have a UTI, please call our office so that we can place an order for you to drop off a urine specimen at the closest [facility] lab (we will arrange).                --We will call in antibiotics for you to start right after you drop off specimen.                --In this way, we can determine:                           1)  Do you have a UTI?                            2) If you have a UTI, is it sensitive to the antibiotics we prescribed?   --follow UTI prevention tips (see attached)  --control " bowel movements/fecal cross-contamination  --change your liner pad every time you pull your pants on  --empty bladder before and after intercourse  --continue taking 1 probiotic pill (any with lactobacillus and/or acidophilus) daily  --retroperitoneal ultrasound normal 04/2023)  --cystoscopy normal  but with moderate trabeculations 11/2021              ---with PV urgency, concern for OAB              6. Microscopic hematuria  --retroperitoneal ultrasound normal  --cystoscopy normal today but with moderate trabeculations               ---with PV urgency, concern for OAB      7. concern for neurogenic bladder  --have your  catheterize you 2 times daily  --has seen Togami  --retroperitoneal ultrasound normal  --cmp --renal function normal    8.Follow up with urology as scheduled    9. Pap today  --takes 2-3 weeks for results    10.enlarged uterus  --pelvic ultrasound ordered    11. 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    Анна Moreno, DAXA-BC  [facility]  Division of Female Pelvic Medicine and Reconstructive Surgery  Department of Obstetrics & Gynecology

## 2024-08-20 LAB
FINAL PATHOLOGIC DIAGNOSIS: NORMAL
Lab: NORMAL

## 2024-08-21 ENCOUNTER — HOSPITAL ENCOUNTER (OUTPATIENT)
Dept: RADIOLOGY | Facility: HOSPITAL | Age: 39
Discharge: HOME | End: 2024-08-21
Attending: NURSE PRACTITIONER
Payer: MEDICAID

## 2024-08-21 DIAGNOSIS — N85.2 ENLARGED UTERUS: ICD-10-CM

## 2024-08-21 PROCEDURE — 76856 US EXAM PELVIC COMPLETE: CPT | Mod: 26,,, | Performed by: RADIOLOGY

## 2024-08-21 PROCEDURE — 76856 US EXAM PELVIC COMPLETE: CPT | Mod: TC

## 2024-08-23 ENCOUNTER — HOSPITAL ENCOUNTER (EMERGENCY)
Facility: HOSPITAL | Age: 39
Discharge: HOME | End: 2024-08-23
Attending: EMERGENCY MEDICINE
Payer: MEDICAID

## 2024-08-23 VITALS
WEIGHT: 115 LBS | SYSTOLIC BLOOD PRESSURE: 101 MMHG | BODY MASS INDEX: 20.38 KG/M2 | RESPIRATION RATE: 18 BRPM | TEMPERATURE: 99 F | HEART RATE: 66 BPM | HEIGHT: 63 IN | DIASTOLIC BLOOD PRESSURE: 68 MMHG | OXYGEN SATURATION: 100 %

## 2024-08-23 DIAGNOSIS — R33.8 ACUTE URINARY RETENTION: Primary | ICD-10-CM

## 2024-08-23 DIAGNOSIS — N30.01 ACUTE CYSTITIS WITH HEMATURIA: ICD-10-CM

## 2024-08-23 LAB
BACTERIA #/AREA URNS HPF: ABNORMAL /HPF
BILIRUB UR QL STRIP: NEGATIVE
CLARITY UR: ABNORMAL
COLOR UR: YELLOW
GLUCOSE UR QL STRIP: NEGATIVE
HGB UR QL STRIP: ABNORMAL
KETONES UR QL STRIP: NEGATIVE
LEUKOCYTE ESTERASE UR QL STRIP: ABNORMAL
MICROSCOPIC COMMENT: ABNORMAL
NITRITE UR QL STRIP: POSITIVE
PH UR STRIP: 8 [PH] (ref 5–8)
PROT UR QL STRIP: NEGATIVE
RBC #/AREA URNS HPF: 90 /HPF (ref 0–4)
SP GR UR STRIP: 1 (ref 1–1.03)
URN SPEC COLLECT METH UR: ABNORMAL
UROBILINOGEN UR STRIP-ACNC: NEGATIVE EU/DL
WBC #/AREA URNS HPF: >100 /HPF (ref 0–5)

## 2024-08-23 PROCEDURE — 87186 SC STD MICRODIL/AGAR DIL: CPT | Performed by: EMERGENCY MEDICINE

## 2024-08-23 PROCEDURE — 87088 URINE BACTERIA CULTURE: CPT | Performed by: EMERGENCY MEDICINE

## 2024-08-23 PROCEDURE — 25000003 PHARM REV CODE 250: Performed by: EMERGENCY MEDICINE

## 2024-08-23 PROCEDURE — 81000 URINALYSIS NONAUTO W/SCOPE: CPT | Performed by: EMERGENCY MEDICINE

## 2024-08-23 PROCEDURE — P9612 CATHETERIZE FOR URINE SPEC: HCPCS

## 2024-08-23 PROCEDURE — 99284 EMERGENCY DEPT VISIT MOD MDM: CPT

## 2024-08-23 PROCEDURE — 87086 URINE CULTURE/COLONY COUNT: CPT | Performed by: EMERGENCY MEDICINE

## 2024-08-23 RX ORDER — PHENAZOPYRIDINE HYDROCHLORIDE 200 MG/1
200 TABLET, FILM COATED ORAL
Qty: 10 TABLET | Refills: 0 | Status: SHIPPED | OUTPATIENT
Start: 2024-08-23

## 2024-08-23 RX ORDER — CEFUROXIME AXETIL 500 MG/1
500 TABLET ORAL EVERY 12 HOURS
Qty: 14 TABLET | Refills: 0 | Status: SHIPPED | OUTPATIENT
Start: 2024-08-23 | End: 2024-08-30

## 2024-08-23 RX ORDER — CEFUROXIME AXETIL 250 MG/1
500 TABLET ORAL
Status: COMPLETED | OUTPATIENT
Start: 2024-08-23 | End: 2024-08-23

## 2024-08-23 RX ORDER — HYDROCODONE BITARTRATE AND ACETAMINOPHEN 5; 325 MG/1; MG/1
1 TABLET ORAL
Status: COMPLETED | OUTPATIENT
Start: 2024-08-23 | End: 2024-08-23

## 2024-08-23 RX ADMIN — HYDROCODONE BITARTRATE AND ACETAMINOPHEN 1 TABLET: 5; 325 TABLET ORAL at 01:08

## 2024-08-23 RX ADMIN — CEFUROXIME AXETIL 500 MG: 250 TABLET ORAL at 02:08

## 2024-08-23 NOTE — ED NOTES
Patient is requesting something for pain.  States her abd hurts and feels like she have to go.  MD was notified.

## 2024-08-23 NOTE — ED PROVIDER NOTES
Encounter Date: 2024       History     Chief Complaint   Patient presents with    Urinary Retention     Pt has hx of muscular dystrophy and self caths with the help of her . Pt reports she has a uti, can feel pressure and needs to be catheterized.       The patient is a 38-year-old female who came to the emergency department with urinary retention.  She has a history of muscular dystrophy and states when she gets a urinary tract infection, she has urinary retention and her  performs intermittent catheterization to relieve the retention.  He is at work today.  She is complaining of some lower abdominal pain.  No fever, no nausea vomiting or diarrhea.  No flank pain.      Review of patient's allergies indicates:  No Known Allergies  Past Medical History:   Diagnosis Date    Cerebral palsy     Gait abnormality     Low blood pressure      Past Surgical History:   Procedure Laterality Date     SECTION      FLUOROSCOPIC URODYNAMIC STUDY N/A 2023    Procedure: URODYNAMIC STUDY, FLUOROSCOPIC;  Surgeon: Bev Davalos MD;  Location: [address];  Service: Urology;  Laterality: N/A;    LAPAROSCOPIC SALPINGECTOMY Bilateral 7/10/2020    Procedure: SALPINGECTOMY, LAPAROSCOPIC;  Surgeon: Rosa Mcclendon MD;  Location: [facility];  Service: OB/GYN;  Laterality: Bilateral;  video confirmed       No family history on file.  Social History     Tobacco Use    Smoking status: Never     Passive exposure: Never    Smokeless tobacco: Never   Substance Use Topics    Alcohol use: No    Drug use: No     Review of Systems   All other systems reviewed and are negative.      Physical Exam     Initial Vitals [24 1200]   BP Pulse Resp Temp SpO2   118/66 68 18 98.8 °F (37.1 °C) 100 %      MAP       --         Physical Exam    Nursing note and vitals reviewed.  Constitutional: She appears well-developed and well-nourished.   HENT:   Head: Normocephalic and atraumatic.   Neck: Neck supple.   Normal  range of motion.  Pulmonary/Chest: Breath sounds normal.   Abdominal: Abdomen is soft. She exhibits distension (Suprapubic area). There is abdominal tenderness (Suprapubic area).   Musculoskeletal:      Cervical back: Normal range of motion and neck supple.      Comments: Diffuse weakness to bilateral lower extremities     Neurological: She is alert and oriented to person, place, and time.   Skin: Skin is warm and dry.   Psychiatric: She has a normal mood and affect. Her behavior is normal. Judgment and thought content normal.         ED Course   Procedures  Labs Reviewed   URINALYSIS, REFLEX TO URINE CULTURE - Abnormal       Result Value    Specimen UA Urine, Clean Catch      Color, UA Yellow      Appearance, UA Hazy (*)     pH, UA 8.0      Specific Gravity, UA 1.005      Protein, UA Negative      Glucose, UA Negative      Ketones, UA Negative      Bilirubin (UA) Negative      Occult Blood UA 3+ (*)     Nitrite, UA Positive (*)     Urobilinogen, UA Negative      Leukocytes, UA 3+ (*)     Narrative:     Specimen Source->Urine   URINALYSIS MICROSCOPIC - Abnormal    RBC, UA 90 (*)     WBC, UA >100 (*)     Bacteria None      Microscopic Comment SEE COMMENT      Narrative:     Specimen Source->Urine   CULTURE, URINE          Imaging Results    None          Medications   cefUROXime tablet 500 mg (has no administration in time range)   HYDROcodone-acetaminophen 5-325 mg per tablet 1 tablet (1 tablet Oral Given 8/23/24 1345)     Medical Decision Making  Differential Diagnosis includes, but is not limited to:  AAA, aortic dissection, mesenteric ischemia, perforated viscous, MI/ACS, SBO/volvulus, incarcerated/strangulated hernia, intussusception, ileus, appendicitis, cholecystitis, cholangitis, diverticulitis, esophagitis, hepatitis, nephrolithiasis, pancreatitis, gastroenteritis, colitis, IBD/IBS, biliary colic, GERD, PUD, constipation, UTI/pyelonephritis,  disorder.     MDM:  The patient is a 38-year-old female with a  history of muscular dystrophy has difficulty ambulating and difficulty urinating when she has a urinary tract infection.  Her  is able to cath her.  She has a UTI today and will be given a dose of cefuroxime in the emergency department and discharged with a prescription for cefuroxime and Pyridium.  She is afebrile and has no flank pain.    Amount and/or Complexity of Data Reviewed  Labs: ordered. Decision-making details documented in ED Course.    Risk  Prescription drug management.                                      Clinical Impression:  Final diagnoses:  [R33.8] Acute urinary retention (Primary)  [N30.01] Acute cystitis with hematuria          ED Disposition Condition    Discharge Stable          ED Prescriptions       Medication Sig Dispense Start Date End Date Auth. Provider    cefUROXime (CEFTIN) 500 MG tablet Take 1 tablet (500 mg total) by mouth every 12 (twelve) hours. for 7 days 14 tablet 8/23/2024 8/30/2024 Aylin Jordan MD    phenazopyridine (PYRIDIUM) 200 MG tablet Take 1 tablet (200 mg total) by mouth every meal as needed for Pain. 10 tablet 8/23/2024 -- Aylin Jordan MD          Follow-up Information       Follow up With Specialties Details Why Contact Info    Ivett Cardoso MD Internal Medicine   [address] [facility] [address]  [phone]               Aylin Jordan MD  08/23/24 2342

## 2024-08-23 NOTE — ED NOTES
Pt has history of muscular dystrophy with urinary retention. Pt reports that  assists with self catheterization but is at work today and was unable to assist with catheterization today. Pt complaining of distended bladder and lower abdominal pain.

## 2024-08-26 LAB — BACTERIA UR CULT: ABNORMAL

## 2024-08-27 ENCOUNTER — PATIENT MESSAGE (OUTPATIENT)
Dept: UROGYNECOLOGY | Facility: CLINIC | Age: 39
End: 2024-08-27
Payer: MEDICAID

## 2024-08-27 LAB
HPV HR 12 DNA SPEC QL NAA+PROBE: NEGATIVE
HPV16 AG SPEC QL: NEGATIVE
HPV18 DNA SPEC QL NAA+PROBE: NEGATIVE

## 2024-08-29 ENCOUNTER — TELEPHONE (OUTPATIENT)
Dept: INTERNAL MEDICINE | Facility: CLINIC | Age: 39
End: 2024-08-29
Payer: MEDICAID

## 2024-09-30 ENCOUNTER — TELEPHONE (OUTPATIENT)
Dept: PHYSICAL MEDICINE AND REHAB | Facility: CLINIC | Age: 39
End: 2024-09-30
Payer: MEDICAID

## 2024-09-30 NOTE — TELEPHONE ENCOUNTER
----- Message from Oliver sent at 9/30/2024 11:48 AM CDT -----  Contact: pt  Type:  Sooner Apoointment Request    Caller is requesting a sooner appointment.  Caller declined first available appointment listed below.  Caller will not accept being placed on the waitlist and is requesting a message be sent to doctor.  Name of Caller:pt  When is the first available appointment? N/a  Symptoms:referral  Would the patient rather a call back or [facility]? call  Best Call Back Number:[phone]

## 2024-10-01 ENCOUNTER — PATIENT MESSAGE (OUTPATIENT)
Dept: INTERNAL MEDICINE | Facility: CLINIC | Age: 39
End: 2024-10-01
Payer: MEDICAID

## 2024-10-02 ENCOUNTER — TELEPHONE (OUTPATIENT)
Dept: UROLOGY | Facility: CLINIC | Age: 39
End: 2024-10-02
Payer: MEDICAID

## 2024-10-02 NOTE — TELEPHONE ENCOUNTER
----- Message from Anabela sent at 10/1/2024 12:15 PM CDT -----  Type:  Appointment Request         Name of Caller:pt  When is the first available appointment?No access  Symptoms:check up  Would the patient rather a call back or a response via [facility]? call  Best Call Back Number:[phone]

## 2024-10-02 NOTE — TELEPHONE ENCOUNTER
----- Message from Ashley sent at 10/2/2024 12:38 PM CDT -----  Regarding: Appt r/s  Contact: [phone]  Medina Hernandez calling regarding Patient Advice (message) for # pt is calling to r/s her appt she can only come in on Wednesdays pls advise

## 2024-10-07 NOTE — TELEPHONE ENCOUNTER
----- Message from Nurse Ross sent at 10/4/2024  5:03 PM CDT -----    ----- Message -----  From: Le Boyer  Sent: 10/4/2024   5:00 PM CDT  To: Anoop Pablo Staff    Type:  Appointment Request     Name of Caller:KI BAIG [[id number]]  When is the first available appointment?No access  Symptoms:  Would the patient rather a call back or a response via [facility]? call  Best Call Back Number:[phone]   Additional Information: The patient is requesting a later date than 11/1. Please give them a call to schedule as i dont have access to schedule

## 2024-10-08 NOTE — TELEPHONE ENCOUNTER
----- Message from Za sent at 10/8/2024 10:38 AM CDT -----  Regarding: moe appt  Contact: @ [phone]  Pt is calling to reschedule appointment on 11/01 to the 6 th or 7 th which is better for her  ...no available dates Pleaes call and adv @ [phone]

## 2024-11-01 ENCOUNTER — OFFICE VISIT (OUTPATIENT)
Dept: UROLOGY | Facility: CLINIC | Age: 39
End: 2024-11-01
Payer: MEDICAID

## 2024-11-01 VITALS
BODY MASS INDEX: 20.39 KG/M2 | SYSTOLIC BLOOD PRESSURE: 100 MMHG | WEIGHT: 115.06 LBS | DIASTOLIC BLOOD PRESSURE: 64 MMHG | HEART RATE: 63 BPM | HEIGHT: 63 IN

## 2024-11-01 DIAGNOSIS — N39.41 URGE INCONTINENCE: Primary | ICD-10-CM

## 2024-11-01 PROCEDURE — 99213 OFFICE O/P EST LOW 20 MIN: CPT | Mod: PBBFAC | Performed by: UROLOGY

## 2024-11-01 PROCEDURE — 99999 PR PBB SHADOW E&M-EST. PATIENT-LVL III: CPT | Mod: PBBFAC,,, | Performed by: UROLOGY

## 2024-11-01 RX ORDER — DARIFENACIN 7.5 MG/1
7.5 TABLET, EXTENDED RELEASE ORAL DAILY
Qty: 30 TABLET | Refills: 11 | Status: SHIPPED | OUTPATIENT
Start: 2024-11-01

## 2024-11-05 ENCOUNTER — OFFICE VISIT (OUTPATIENT)
Dept: UROGYNECOLOGY | Facility: CLINIC | Age: 39
End: 2024-11-05
Payer: MEDICAID

## 2024-11-05 VITALS
SYSTOLIC BLOOD PRESSURE: 87 MMHG | DIASTOLIC BLOOD PRESSURE: 58 MMHG | HEIGHT: 63 IN | BODY MASS INDEX: 20.39 KG/M2 | HEART RATE: 75 BPM

## 2024-11-05 DIAGNOSIS — G80.9 CEREBRAL PALSY, UNSPECIFIED TYPE: ICD-10-CM

## 2024-11-05 DIAGNOSIS — R33.9 URINARY RETENTION: ICD-10-CM

## 2024-11-05 DIAGNOSIS — N36.44 DETRUSOR AND SPHINCTER DYSSYNERGIA: Primary | ICD-10-CM

## 2024-11-05 PROCEDURE — 1160F RVW MEDS BY RX/DR IN RCRD: CPT | Mod: CPTII,,, | Performed by: NURSE PRACTITIONER

## 2024-11-05 PROCEDURE — 3008F BODY MASS INDEX DOCD: CPT | Mod: CPTII,,, | Performed by: NURSE PRACTITIONER

## 2024-11-05 PROCEDURE — 3044F HG A1C LEVEL LT 7.0%: CPT | Mod: CPTII,,, | Performed by: NURSE PRACTITIONER

## 2024-11-05 PROCEDURE — 3078F DIAST BP <80 MM HG: CPT | Mod: CPTII,,, | Performed by: NURSE PRACTITIONER

## 2024-11-05 PROCEDURE — 99213 OFFICE O/P EST LOW 20 MIN: CPT | Mod: PBBFAC | Performed by: NURSE PRACTITIONER

## 2024-11-05 PROCEDURE — 1159F MED LIST DOCD IN RCRD: CPT | Mod: CPTII,,, | Performed by: NURSE PRACTITIONER

## 2024-11-05 PROCEDURE — 99213 OFFICE O/P EST LOW 20 MIN: CPT | Mod: S$PBB,,, | Performed by: NURSE PRACTITIONER

## 2024-11-05 PROCEDURE — 3074F SYST BP LT 130 MM HG: CPT | Mod: CPTII,,, | Performed by: NURSE PRACTITIONER

## 2024-11-05 PROCEDURE — 99999 PR PBB SHADOW E&M-EST. PATIENT-LVL III: CPT | Mod: PBBFAC,,, | Performed by: NURSE PRACTITIONER

## 2024-11-05 NOTE — PROGRESS NOTES
Urogyn follow up  11/05/2024  .  [facility] - UROGYNECOLOGY  [address] [address]    Medina Hernandez  [id number]  1985      Medina Hernandez is a 38 y.o.  here for a urogyn follow up for urinary retention.    Last HPI from 09/24/2021  1)  Mixed urinary incontinence, urge > stress:    --+ urinary urgency  --not leaking urine     2. Constipation:  --taking fiber supplement.     3. Pelvic floor weakness  --has not started pelvic floor PT        4. Vulvar atrophy-- concerns for lichen  --using clobetasol and estrogen cream     5. Frequent UTIs (bladder infections):  --no current symptoms     6.microscopic hematuria  --6 rbc in cath specimen    02/09/2022  1)  Mixed urinary incontinence, urge > stress:    --+ urinary urgency/ UUI  --taking vesicare 5 mg daily--feels like she can not initiate void sometime  --voiding every hour during the day  --nocturia 2/ night--does not limit fluids  --1-2 pullups/ day  --minimally wet for the most part--occasionally soaked  2. Constipation:  --improved  --taking fiber supplement.  3. Pelvic floor weakness  --had eval  --needs to schedule follow up visit   4. Vulvar atrophy-- concerns for lichen  --using clobetasol and estrogen cream--has not been using   5. Frequent UTIs (bladder infections):  --no current symptoms  6.microscopic hematuria  --normal cysto 11/2021 01/25/2023  1. Mixed urinary incontinence, urge > stress with post-void urgency:  --reports having trouble urinating occasionally--occurs 1-2 times weekly  --denies urinary urgency  --taking  continue vesicare 5 mg daily.  --gemtessa was not covered .   2. Constipation:  --intermittent  3. Pelvic floor weakness/tension  --getting ready to start PT  4. Vulvar atrophy-- concerns for lichen  --no longer using clobetasol  5. Frequent UTIs (bladder infections):  --denies dysuria at this time  --taking cranberry pills  --8/2021 urine C&S (clean cath) + staph aureus (may have been skin  "contaminant)  --continue taking 1 probiotic pill (any with lactobacillus and/or acidophilus) daily  --retroperitoneal ultrasound normal   --cystoscopy normal  but with moderate trabeculations               ---with PV urgency, concern for OAB   6. Microscopic hematuria  --retroperitoneal ultrasound normal  --cystoscopy normal  but with moderate trabeculations               ---with PV urgency, concern for OAB     2023  Treated for uti last in 2023  Called today complaining of inability to urinate. Reports this has happened a few times this week.  Has not been able to attend pelvic floor PT  Abdomen appears distended    2023  Here for joseph removal and teach cic to     2024  Has not been performing cic  Complains of "urine going in by stomach and I can not get it out"  Was scheduled to see Dr. King for eval, but she is requesting a female staff provider.  Denies constipation    2024  Has not been performing cic regularly due to  out of town.  She does not think she can do it  Her mother is not willing to learn today  Patient does not want joseph  Concerned she has a uti    Changes since last visit:  Here for mother to learn cic  Met with Dr. Davalos-- considering botox for urgency. Explained that it may help her to empty her bladder due to Detrusor-external sphincter dyssynergia , but she may still need to do cic.         Past Medical History:   Diagnosis Date    Cerebral palsy     Gait abnormality     Low blood pressure        Past Surgical History:   Procedure Laterality Date     SECTION      FLUOROSCOPIC URODYNAMIC STUDY N/A 2023    Procedure: URODYNAMIC STUDY, FLUOROSCOPIC;  Surgeon: Bev Davalos MD;  Location: [address];  Service: Urology;  Laterality: N/A;    LAPAROSCOPIC SALPINGECTOMY Bilateral 7/10/2020    Procedure: SALPINGECTOMY, LAPAROSCOPIC;  Surgeon: Rosa Mcclendon MD;  Location: [facility] OR;  Service: OB/GYN;  Laterality: Bilateral;  video " confirmed 7/9 CW       No family history on file.    Social History     Socioeconomic History    Marital status:     Number of children: 1   Tobacco Use    Smoking status: Never     Passive exposure: Never    Smokeless tobacco: Never   Substance and Sexual Activity    Alcohol use: No    Drug use: No    Sexual activity: Yes     Partners: Male     Birth control/protection: Condom, Implant       Current Outpatient Medications   Medication Sig Dispense Refill    acetaminophen (TYLENOL) 500 MG tablet Take 1-2 tablets (500-1,000 mg total) by mouth 3 (three) times daily as needed for Pain.  0    albuterol (PROVENTIL/VENTOLIN HFA) 90 mcg/actuation inhaler INHALE 1 TO 2 PUFFS INTO THE LUNGS EVERY 6 HOURS AS NEEDED 18 g 0    baclofen (LIORESAL) 20 MG tablet Take 1 tablet (20 mg total) by mouth 3 (three) times daily. 90 tablet 2    calcium carbonate (CALTRATE 600 ORAL) Take by mouth once daily.      clobetasol 0.05% (TEMOVATE) 0.05 % Oint Apply topically once daily. 30 g 3    cyanocobalamin (VITAMIN B-12) 500 MCG tablet Take 500 mcg by mouth once daily.      darifenacin (ENABLEX) 7.5 MG Tb24 Take 1 tablet (7.5 mg total) by mouth once daily. 30 tablet 11    ibuprofen (ADVIL,MOTRIN) 600 MG tablet Take 1 tablet (600 mg total) by mouth every 6 (six) hours as needed for Pain (pain). 30 tablet 1    lidocaine HCL 2% (XYLOCAINE) 2 % jelly Apply topically 3 (three) times daily. Apply to affected area three times daily prn 30 mL 1    phenazopyridine (PYRIDIUM) 200 MG tablet Take 1 tablet (200 mg total) by mouth every meal as needed for Pain. 10 tablet 0     No current facility-administered medications for this visit.       Review of patient's allergies indicates:  No Known Allergies    Well woman:  Pap test: 09/2021 normal HPV negative History of abnormal paps: No.  History of STIs:  No  Mammogram: n/a  Colonoscopy n/a  DEXA:  n/a    ROS:  As per HPI.      Exam  BP (!) 87/58 (BP Location: Right arm, Patient Position: Sitting)    "Pulse 75   Ht 5' 3" (1.6 m)   BMI 20.39 kg/m²   General: alert and oriented, no acute distress  Respiratory: normal respiratory effort  Abd: soft, non-tender, distended-- bladder palpable    PELVIC EXAM: deferred      Instructed mother on cic-- able to do independently    Impression  1. Detrusor and sphincter dyssynergia        2. Cerebral palsy, unspecified type        3. Urinary retention                  We reviewed the above issues and discussed options for short-term versus long-term management of her problems.   Plan:      1. Mixed urinary incontinence, urge > stress with post-void urgency:  --URGE: no longer taking vesicare Gemtessa was not covered. Recently prescribed darifenacin  --STRESS:  Pessary vs. Sling.  --try to void every 2-3 hours.  --if you have not been able to urinate, please do self catheterization in 4 hours  --perform catheterization at least 3 times  daily  --she will work with her mother on performing independently in the shower     2. Constipation:  --continue fiber supplement     3. Pelvic floor weakness/TENSION  --use vaginal valium twice daily as needed    4. Vulvar atrophy-- concerns for lichen  --twice weekly AM apply steroid ointment/cream:  Apply dime-sized amount with finger to vaginal opening, inner lips, and all external areas (including around anus) that are irritated.  DO NOT APPLY INTERNALLY.   --twice weekly PM apply estrogen cream.  Apply dime-sized amount with finger to vaginal opening, inner lips, and all external areas (including around anus) that are irritated. Also apply small amount inside vagina with finger (insert to knuckle).     5. Frequent UTIs (bladder infections):  --8/2021 urine C&S (clean cath) + staph aureus (may have been skin contaminant)  --If you feel like you have a UTI, please call our office so that we can place an order for you to drop off a urine specimen at the closest [facility] lab (we will arrange).                --We will call in antibiotics for " you to start right after you drop off specimen.                --In this way, we can determine:                           1)  Do you have a UTI?                            2) If you have a UTI, is it sensitive to the antibiotics we prescribed?   --follow UTI prevention tips (see attached)  --control bowel movements/fecal cross-contamination  --change your liner pad every time you pull your pants on  --empty bladder before and after intercourse  --continue taking 1 probiotic pill (any with lactobacillus and/or acidophilus) daily  --retroperitoneal ultrasound normal 04/2023)  --cystoscopy normal  but with moderate trabeculations 11/2021              ---with PV urgency, concern for OAB              6. Microscopic hematuria  --retroperitoneal ultrasound normal  --cystoscopy normal today but with moderate trabeculations               ---with PV urgency, concern for OAB      7.Detrusor-external sphincter dyssynergia   --cic 3 times daily  --followed by dr. Davalos  --retroperitoneal ultrasound normal  --cmp --renal function normal    8.let me know how things are going in 3-4 weeks      I spent a total of 20 minutes on the day of the visit.  This includes face to face time and non-face to face time preparing to see the patient (eg, review of tests), obtaining and/or reviewing separately obtained history, documenting clinical information in the electronic or other health record, independently interpreting results and communicating results to the patient/family/caregiver, or care coordinator.    Анна Moreno, WILDERP-BC [facility]  Division of Female Pelvic Medicine and Reconstructive Surgery  Department of Obstetrics & Gynecology

## 2024-11-06 NOTE — PATIENT INSTRUCTIONS
1. Mixed urinary incontinence, urge > stress with post-void urgency:  --URGE: no longer taking vesicare Gemtessa was not covered. Recently prescribed darifenacin  --STRESS:  Pessary vs. Sling.  --try to void every 2-3 hours.  --if you have not been able to urinate, please do self catheterization in 4 hours  --perform catheterization at least 3 times  daily  --she will work with her mother on performing independently in the shower     2. Constipation:  --continue fiber supplement     3. Pelvic floor weakness/TENSION  --use vaginal valium twice daily as needed    4. Vulvar atrophy-- concerns for lichen  --twice weekly AM apply steroid ointment/cream:  Apply dime-sized amount with finger to vaginal opening, inner lips, and all external areas (including around anus) that are irritated.  DO NOT APPLY INTERNALLY.   --twice weekly PM apply estrogen cream.  Apply dime-sized amount with finger to vaginal opening, inner lips, and all external areas (including around anus) that are irritated. Also apply small amount inside vagina with finger (insert to knuckle).     5. Frequent UTIs (bladder infections):  --8/2021 urine C&S (clean cath) + staph aureus (may have been skin contaminant)  --If you feel like you have a UTI, please call our office so that we can place an order for you to drop off a urine specimen at the closest [facility] lab (we will arrange).                --We will call in antibiotics for you to start right after you drop off specimen.                --In this way, we can determine:                           1)  Do you have a UTI?                            2) If you have a UTI, is it sensitive to the antibiotics we prescribed?   --follow UTI prevention tips (see attached)  --control bowel movements/fecal cross-contamination  --change your liner pad every time you pull your pants on  --empty bladder before and after intercourse  --continue taking 1 probiotic pill (any with lactobacillus and/or acidophilus)  daily  --retroperitoneal ultrasound normal 04/2023)  --cystoscopy normal  but with moderate trabeculations 11/2021              ---with PV urgency, concern for OAB              6. Microscopic hematuria  --retroperitoneal ultrasound normal  --cystoscopy normal today but with moderate trabeculations               ---with PV urgency, concern for OAB      7.Detrusor-external sphincter dyssynergia   --cic 3 times daily  --followed by dr. Davalos  --retroperitoneal ultrasound normal  --cmp --renal function normal    8.let me know how things are going in 3-4 weeks

## 2024-11-27 ENCOUNTER — HOSPITAL ENCOUNTER (EMERGENCY)
Facility: HOSPITAL | Age: 39
Discharge: HOME | End: 2024-11-27
Attending: EMERGENCY MEDICINE
Payer: MEDICAID

## 2024-11-27 VITALS
BODY MASS INDEX: 21.25 KG/M2 | OXYGEN SATURATION: 100 % | DIASTOLIC BLOOD PRESSURE: 64 MMHG | HEART RATE: 88 BPM | HEIGHT: 63 IN | WEIGHT: 119.94 LBS | RESPIRATION RATE: 20 BRPM | SYSTOLIC BLOOD PRESSURE: 110 MMHG | TEMPERATURE: 99 F

## 2024-11-27 DIAGNOSIS — S01.112A EYEBROW LACERATION, LEFT, INITIAL ENCOUNTER: Primary | ICD-10-CM

## 2024-11-27 PROCEDURE — 12011 RPR F/E/E/N/L/M 2.5 CM/<: CPT

## 2024-11-27 PROCEDURE — 63600175 PHARM REV CODE 636 W HCPCS: Performed by: PHYSICIAN ASSISTANT

## 2024-11-27 PROCEDURE — 99283 EMERGENCY DEPT VISIT LOW MDM: CPT | Mod: 25

## 2024-11-27 RX ORDER — LIDOCAINE HYDROCHLORIDE 10 MG/ML
10 INJECTION, SOLUTION EPIDURAL; INFILTRATION; INTRACAUDAL; PERINEURAL
Status: COMPLETED | OUTPATIENT
Start: 2024-11-27 | End: 2024-11-27

## 2024-11-27 RX ADMIN — LIDOCAINE HYDROCHLORIDE 50 MG: 10 INJECTION, SOLUTION EPIDURAL; INFILTRATION; INTRACAUDAL at 04:11

## 2024-11-27 NOTE — DISCHARGE INSTRUCTIONS

## 2024-11-27 NOTE — ED PROVIDER NOTES
Encounter Date: 2024       History     Chief Complaint   Patient presents with    fall     Pt presents to ED today via [facility] from home   Reports fall while in shower lac to left brow  Hx of CP      38-year-old female, PMHx CP, presents to ED via EMS from home after slip and fall in bathroom resulting in left eyebrow laceration.  No LOC. no use of blood thinners.  Bleeding quickly controlled with pressure.  Tetanus is up-to-date.  Denying visual changes or pain with eye movement, headache, lightheadedness or dizziness, nausea or vomiting.  Denying any associated neck pain or other injuries associated from fall.  No other acute complaints at this time    The history is provided by the patient.     Review of patient's allergies indicates:  No Known Allergies  Past Medical History:   Diagnosis Date    Cerebral palsy     Gait abnormality     Low blood pressure      Past Surgical History:   Procedure Laterality Date     SECTION      FLUOROSCOPIC URODYNAMIC STUDY N/A 2023    Procedure: URODYNAMIC STUDY, FLUOROSCOPIC;  Surgeon: Bev Davalos MD;  Location: [address];  Service: Urology;  Laterality: N/A;    LAPAROSCOPIC SALPINGECTOMY Bilateral 7/10/2020    Procedure: SALPINGECTOMY, LAPAROSCOPIC;  Surgeon: Rosa Mcclendon MD;  Location: [facility];  Service: OB/GYN;  Laterality: Bilateral;  video confirmed  CW     No family history on file.  Social History     Tobacco Use    Smoking status: Never     Passive exposure: Never    Smokeless tobacco: Never   Substance Use Topics    Alcohol use: No    Drug use: No     Review of Systems   Skin:  Positive for wound.   Neurological:  Negative for syncope, weakness, numbness and headaches.       Physical Exam     Initial Vitals [24 1548]   BP Pulse Resp Temp SpO2   108/68 90 20 98.6 °F (37 °C) 100 %      MAP       --         Physical Exam    Vitals reviewed.  Constitutional: Vital signs are normal. She appears well-developed and well-nourished. She is  cooperative. She does not have a sickly appearance. She does not appear ill. No distress.   HENT:   Head: Normocephalic.       2.5 cm lac to left eyebrow.    Eyes: EOM are normal.   Neck:   Normal range of motion.  Musculoskeletal:      Cervical back: Normal range of motion.     Neurological: She is alert and oriented to person, place, and time. GCS eye subscore is 4. GCS verbal subscore is 5. GCS motor subscore is 6.   Psychiatric: She has a normal mood and affect. Her speech is normal and behavior is normal.         ED Course   Lac Repair    Date/Time: 11/27/2024 4:46 PM    Performed by: Amos Amato PA-C  Authorized by: Francesco Godinez MD    Consent:     Consent obtained:  Verbal    Consent given by:  Patient    Risks discussed:  Infection and poor cosmetic result  Universal protocol:     Patient identity confirmed:  Verbally with patient  Anesthesia:     Anesthesia method:  Local infiltration    Local anesthetic:  Lidocaine 1% w/o epi  Laceration details:     Location:  Face    Face location:  L eyebrow    Length (cm):  2.5  Exploration:     Hemostasis achieved with:  Direct pressure  Treatment:     Area cleansed with:  Saline    Irrigation method:  Syringe    Debridement:  None    Undermining:  None    Scar revision: no    Skin repair:     Repair method:  Sutures    Suture size:  4-0    Suture material:  Prolene    Suture technique:  Simple interrupted    Number of sutures:  2  Approximation:     Approximation:  Close  Repair type:     Repair type:  Simple  Post-procedure details:     Dressing:  Sterile dressing    Procedure completion:  Tolerated well, no immediate complications    Labs Reviewed - No data to display       Imaging Results    None          Medications   LIDOcaine (PF) 10 mg/ml (1%) injection 100 mg (50 mg Infiltration Given by Other 11/27/24 2912)     Medical Decision Making  Patient presents via EMS from home after slip and fall resulting in laceration to left eyebrow.  No LOC.  No use of  blood thinners.  Denying any other associated injuries from fall.  Afebrile with vitals WNL.  Patient no distress on exam.    DDx:  Including but not limited to fall, contusing, laceration, abrasion    Risk  Prescription drug management.               ED Course as of 11/27/24 1648   Wed Nov 27, 2024   [address] [place] [address] head CT criteria reviewed.  Low concern for acute intracranial bleed.  Will defer CT of head at this time.  Wound site was thoroughly cleaned and primary closed.  Patient tolerated well.  Sterile dressings applied.  Her tetanus is up-to-date.  Encouraged to keep wound site clean and dry, monitor wound healing closely with PCP/ED follow-up for wound check and suture removal.  ED return precautions were discussed.  Patient states her understanding and agrees with plan [KS]      ED Course User Index  [KS] Amos Amato PA-C                           Clinical Impression:  Final diagnoses:  [S01.112A] Eyebrow laceration, left, initial encounter (Primary)          ED Disposition Condition    Discharge Stable          ED Prescriptions    None       Follow-up Information       Follow up With Specialties Details Why Contact Info    Ivett Cardoso MD Internal Medicine   [address] [facility] [address]  [phone]      [facility] Emergency Dept Emergency Medicine In 5 days For wound re-check, For suture removal [address] [facility] [address] [phone]             Amos Amato PA-C  11/27/24 1648

## 2024-12-04 ENCOUNTER — HOSPITAL ENCOUNTER (EMERGENCY)
Facility: HOSPITAL | Age: 39
Discharge: HOME | End: 2024-12-04
Attending: STUDENT IN AN ORGANIZED HEALTH CARE EDUCATION/TRAINING PROGRAM
Payer: MEDICAID

## 2024-12-04 VITALS
HEART RATE: 99 BPM | SYSTOLIC BLOOD PRESSURE: 104 MMHG | TEMPERATURE: 98 F | RESPIRATION RATE: 16 BRPM | WEIGHT: 115 LBS | HEIGHT: 63 IN | BODY MASS INDEX: 20.38 KG/M2 | OXYGEN SATURATION: 99 % | DIASTOLIC BLOOD PRESSURE: 57 MMHG

## 2024-12-04 DIAGNOSIS — Z48.02 VISIT FOR SUTURE REMOVAL: ICD-10-CM

## 2024-12-04 DIAGNOSIS — Z51.89 VISIT FOR WOUND CHECK: Primary | ICD-10-CM

## 2024-12-04 PROCEDURE — 99999 HC NO LEVEL OF SERVICE - ED ONLY: CPT

## 2024-12-04 NOTE — ED PROVIDER NOTES
Encounter Date: 2024       History     Chief Complaint   Patient presents with    Suture / Staple Removal     Requesting sutures removed over L brow. Denies sx or complaints.      38-year-old female presents to ED for wound check and suture removal.  Two sutures placed to left eyebrow after fall on .  She denies any acute complications.    The history is provided by the patient.     Review of patient's allergies indicates:  No Known Allergies  Past Medical History:   Diagnosis Date    Cerebral palsy     Gait abnormality     Low blood pressure      Past Surgical History:   Procedure Laterality Date     SECTION      FLUOROSCOPIC URODYNAMIC STUDY N/A 2023    Procedure: URODYNAMIC STUDY, FLUOROSCOPIC;  Surgeon: Bev Davalos MD;  Location: [facility] OR [address] [place] [address];  Service: Urology;  Laterality: N/A;    LAPAROSCOPIC SALPINGECTOMY Bilateral 7/10/2020    Procedure: SALPINGECTOMY, LAPAROSCOPIC;  Surgeon: Rosa Mcclendon MD;  Location: [facility] OR;  Service: OB/GYN;  Laterality: Bilateral;  video confirmed  CW     No family history on file.  Social History     Tobacco Use    Smoking status: Never     Passive exposure: Never    Smokeless tobacco: Never   Substance Use Topics    Alcohol use: No    Drug use: No     Review of Systems   Skin:  Positive for wound.       Physical Exam     Initial Vitals [24 1204]   BP Pulse Resp Temp SpO2   (!) 104/57 99 16 98.2 °F (36.8 °C) 99 %      MAP       --         Physical Exam    Vitals reviewed.  Constitutional: Vital signs are normal. She appears well-developed and well-nourished. She is cooperative. She does not have a sickly appearance. She does not appear ill. No distress.   HENT:   Head: Normocephalic.   Laceration to left eyebrow appears to be healing appropriately.  No drainage or swelling.  Minimally tender.  Sutures x2 remaining intact   Eyes: EOM are normal.   Neck:   Normal range of motion.  Musculoskeletal:      Cervical back: Normal range of motion.      Neurological: She is alert and oriented to person, place, and time. GCS eye subscore is 4. GCS verbal subscore is 5. GCS motor subscore is 6.   Psychiatric: She has a normal mood and affect. Her speech is normal and behavior is normal.         ED Course   Suture Removal    Date/Time: 12/4/2024 1:28 PM  Location procedure was performed: [facility] EMERGENCY DEPARTMENT    Performed by: Amos Amato PA-C  Authorized by: Amos Choi MD  Body area: head/neck  Location details: left eyebrow  Wound Appearance: clean and well healed  Sutures Removed: 2  Staples Removed: 0  Post-removal: no dressing applied  Facility: sutures placed in this facility  Patient tolerance: Patient tolerated the procedure well with no immediate complications        Labs Reviewed - No data to display       Imaging Results    None          Medications - No data to display  Medical Decision Making  Patient presents for wound check and suture removal.  Afebrile.  Patient in no distress on exam.    DDx:  Including but not limited to wound check, suture removal    Sutures removed without complications.  Patient tolerated well.  Encouraged to continue monitor wound healing closely with outpatient follow-up as needed.                                      Clinical Impression:  Final diagnoses:  [Z51.89] Visit for wound check (Primary)  [Z48.02] Visit for suture removal          ED Disposition Condition    Discharge           ED Prescriptions    None       Follow-up Information    None          Amos Amato PA-C  12/04/24 [id number]

## 2024-12-04 NOTE — ED TRIAGE NOTES
Pt presenting to ED for suture removal for sutures placed above left eyebrow 1 week ago. States healing well.

## 2024-12-19 ENCOUNTER — PATIENT MESSAGE (OUTPATIENT)
Dept: UROLOGY | Facility: CLINIC | Age: 39
End: 2024-12-19
Payer: MEDICAID

## 2025-01-29 ENCOUNTER — TELEPHONE (OUTPATIENT)
Dept: PHYSICAL MEDICINE AND REHAB | Facility: CLINIC | Age: 40
End: 2025-01-29
Payer: MEDICAID

## 2025-01-29 NOTE — TELEPHONE ENCOUNTER
----- Message from Ashley sent at 1/27/2025 11:02 AM CST -----  Regarding: Appt Sooner  Contact: [phone]  Medina Hernandez calling regarding Appointment Access  (message) for # pt calling to schedule a f/u appt asap pls advise pls add to waitlist

## 2025-02-06 DIAGNOSIS — G11.9 ATAXIA DUE TO CEREBELLAR DEGENERATION: ICD-10-CM

## 2025-02-06 RX ORDER — BACLOFEN 20 MG/1
20 TABLET ORAL 3 TIMES DAILY
Qty: 90 TABLET | Refills: 2 | Status: SHIPPED | OUTPATIENT
Start: 2025-02-06

## 2025-02-23 ENCOUNTER — HOSPITAL ENCOUNTER (EMERGENCY)
Facility: HOSPITAL | Age: 40
Discharge: HOME | End: 2025-02-23
Attending: EMERGENCY MEDICINE
Payer: MEDICAID

## 2025-02-23 VITALS
RESPIRATION RATE: 18 BRPM | DIASTOLIC BLOOD PRESSURE: 62 MMHG | BODY MASS INDEX: 20.38 KG/M2 | WEIGHT: 115 LBS | HEART RATE: 78 BPM | SYSTOLIC BLOOD PRESSURE: 103 MMHG | OXYGEN SATURATION: 100 % | TEMPERATURE: 98 F | HEIGHT: 63 IN

## 2025-02-23 DIAGNOSIS — S01.111A LACERATION OF RIGHT EYEBROW, INITIAL ENCOUNTER: Primary | ICD-10-CM

## 2025-02-23 DIAGNOSIS — S09.90XA MINOR HEAD INJURY WITHOUT LOSS OF CONSCIOUSNESS, INITIAL ENCOUNTER: ICD-10-CM

## 2025-02-23 PROCEDURE — 99284 EMERGENCY DEPT VISIT MOD MDM: CPT | Mod: 25

## 2025-02-23 PROCEDURE — 63600175 PHARM REV CODE 636 W HCPCS

## 2025-02-23 PROCEDURE — 12011 RPR F/E/E/N/L/M 2.5 CM/<: CPT

## 2025-02-23 RX ORDER — LIDOCAINE HYDROCHLORIDE 10 MG/ML
10 INJECTION, SOLUTION EPIDURAL; INFILTRATION; INTRACAUDAL; PERINEURAL
Status: COMPLETED | OUTPATIENT
Start: 2025-02-23 | End: 2025-02-23

## 2025-02-23 RX ADMIN — LIDOCAINE HYDROCHLORIDE 100 MG: 10 INJECTION, SOLUTION EPIDURAL; INFILTRATION; INTRACAUDAL at 02:02

## 2025-02-23 NOTE — DISCHARGE INSTRUCTIONS
Thank you for letting me care for you today - it was nice to meet you and I hope you feel better soon.  Please return for suture removal in 6-7 days.    Our goal at [facility] is to always give you outstanding care and exceptional service. You may receive a survey by mail or email in the next week about your experience in our ED. We would greatly appreciate you completing and returning the survey. Your feedback provides us with a way to recognize our staff who give very good care and it helps us learn how to improve when your experience was below our aspiration of excellence.     All the best,     Vi Antonio, MPH, PA-C  Emergency Department Physician Assistant  [facility], [facility], [facility]

## 2025-02-23 NOTE — ED PROVIDER NOTES
Encounter Date: 2025       History     Chief Complaint   Patient presents with    Laceration     Pt c/o pain to her lateral right eyebrow, secondary to a laceration caused by a fall. Pt denies loc.     Patient is a 39 y.o. female who presents to the ED for evaluation head injury.  Patient has a history of cerebral palsy and states that she accidentally tripped and fell forward, hitting her forehead.  Patient has a small laceration to the right eyebrow.     The incident was witnessed and there was not a reported LOC.  There has not been nausea or vomiting. Patient does not currently complain of HA.  There has not been alteration in consciousness.  Hematoma is not noted on the cranium. The patient does not report use of blood thinners. The patient does not have a history of bleeding disorders.     Small laceration to the right eyebrow, bleeding controlled. Denies focal neurological weakness, altered mental status, neck pain/stiffness, back pain, chest pain, shortness of breath, vision changes, temporal pain, nose bleeds, rhinorrhea, ear discharge, wheezing, stridor, drooling, NVD, abdominal pain, constipation, urinary problems, joint problems, rashes, or any other complaints at this time.          The history is provided by the patient.     Review of patient's allergies indicates:  No Known Allergies  Past Medical History:   Diagnosis Date    Cerebral palsy     Gait abnormality     Low blood pressure      Past Surgical History:   Procedure Laterality Date     SECTION      FLUOROSCOPIC URODYNAMIC STUDY N/A 2023    Procedure: URODYNAMIC STUDY, FLUOROSCOPIC;  Surgeon: Bev Davalos MD;  Location: [address];  Service: Urology;  Laterality: N/A;    LAPAROSCOPIC SALPINGECTOMY Bilateral 7/10/2020    Procedure: SALPINGECTOMY, LAPAROSCOPIC;  Surgeon: Rosa Mcclendon MD;  Location: [facility] OR;  Service: OB/GYN;  Laterality: Bilateral;  video confirmed  CW     No family history on file.  Social  History[1]  Review of Systems   Constitutional:  Negative for chills and fever.   Eyes: Negative.    Gastrointestinal:  Negative for nausea and vomiting.   Musculoskeletal:  Negative for arthralgias, back pain, neck pain and neck stiffness.   Skin:  Positive for wound.   Neurological:  Negative for headaches.       Physical Exam     Initial Vitals [02/23/25 1321]   BP Pulse Resp Temp SpO2   103/62 78 18 97.9 °F (36.6 °C) 100 %      MAP       --         Physical Exam    Constitutional: She appears well-developed and well-nourished.   HENT:   Head: Normocephalic and atraumatic.   No Francis sign  No hemotympanum  TMs are clear without blood or otorrhea   No mastoid tenderness  Scalp is free of other deformity  Mid face is stable and free of tenderness on palpation  No malocclusion noted  No septal hematoma or rhinorrhea noted      Cardiovascular:  Normal rate.             Neurological: She is alert.         ED Course   Lac Repair    Date/Time: 2/23/2025 9:30 PM    Performed by: Vi Antonio PA-C  Authorized by: Esvin Patel MD    Consent:     Consent obtained:  Verbal    Consent given by:  Patient    Risks, benefits, and alternatives were discussed: yes      Risks discussed:  Infection, need for additional repair, nerve damage, poor cosmetic result, pain and poor wound healing    Alternatives discussed:  No treatment  Universal protocol:     Procedure explained and questions answered to patient or proxy's satisfaction: yes      Patient identity confirmed:  Verbally with patient and arm band  Anesthesia:     Anesthesia method:  Local infiltration    Local anesthetic:  Lidocaine 1% w/o epi  Laceration details:     Length (cm):  2  Pre-procedure details:     Preparation:  Patient was prepped and draped in usual sterile fashion  Exploration:     Hemostasis achieved with:  Direct pressure    Imaging outcome: foreign body not noted    Treatment:     Area cleansed with:  Zeenat    Amount of cleaning:  Standard     Irrigation solution:  Sterile saline  Skin repair:     Repair method:  Sutures    Suture size:  6-0    Wound skin closure material used: Ethilon.    Suture technique:  Horizontal mattress    Number of sutures:  1  Approximation:     Approximation:  Close  Repair type:     Repair type:  Simple  Post-procedure details:     Dressing:  Adhesive bandage    Procedure completion:  Tolerated well, no immediate complications    Labs Reviewed - No data to display       Imaging Results    None          Medications   LIDOcaine (PF) 10 mg/ml (1%) injection 100 mg (100 mg Infiltration Given by Provider 2/23/25 3617)     Medical Decision Making  Patient is an afebrile, nontoxic appearing 39 y.o. female who presents for evaluation following an witnessed fall. The patient's GCS is 15. There are no focal neurological deficits, there was not no nausea/vomiting after the incident. There is not occipital, parietal or temporal scalp hematoma; there was not LOC; Patient acting normal per caregiver. There was not was not a severe mechanism or fall from height greater than 3 feet. There is not a palpable skull fracture. There are are not signs of AMS, patient is without agitation, somnolence, repetitive questioning, or slow response to verbal communication. Patient is not on blood thinners. Patient does not have a history of bleed disorders.     Differential Diagnosis includes but not limited to:  Scalp contusion, concussion, skull fracture , intracranial hemorrhage such as subdural hematoma, subarachnoid hemorrhage or epidural hematoma, soft tissue injury, paraspinal or spinal injury     All historical, clinical, and radiographic findings were reviewed with the patient/family in detail.  Based on NEXUS criteria, a CT was not performed. Observed in the ED for 1 hour 45 min hours with no instability. Stable gait and tolerating po. At this point behavior remains appropriate and I have no suspicion for significant concussion at present. There  has been no vomiting or seizure activity. No focal neurological deficits on serial exams.  Laceration to right forehead repaired with 1 horizontal mattress suture.  See procedure note for details.    The patient was instructed to return immediately to the emergency department for nausea, vomiting, inability to tolerate by mouth, persistent headaches, any abnormal behaviors or excessive lethargy, or with any other concerns. The patient instructed to follow up with PCP in the next 2 days.  All remaining questions and concerns were addressed at that time.  Patient/family has been counseled regarding the need for follow-up as well as the indication to return to the emergency room should new or worrisome developments occur. Pt agrees with assessment, disposition and treatment plan and has no further questions or complaints at this time.      Risk  Prescription drug management.                                      Clinical Impression:  Final diagnoses:  [S01.111A] Laceration of right eyebrow, initial encounter (Primary)  [S09.90XA] Minor head injury without loss of consciousness, initial encounter          ED Disposition Condition    Discharge Stable          ED Prescriptions    None       Follow-up Information    None            Vi Antonio PA-C  02/26/25 2132       Vi Antonio PA-C  02/27/25 1541         [1]   Social History  Tobacco Use    Smoking status: Never     Passive exposure: Never    Smokeless tobacco: Never   Substance Use Topics    Alcohol use: No    Drug use: No        Vi Antonio PA-C  03/03/25 6816

## 2025-02-23 NOTE — ED NOTES
Medina Hernandez, a 39 y.o. female presents to the ED w/ complaint of 10/10 pain to right lateral eyebrow after a fall just prior to arrival with a laceration there. Noted old blood to area, no active bleeding at this time. Report history of Cerebral Palsy and is wheelchair bound.    Triage note:  Chief Complaint   Patient presents with    Laceration     Pt c/o pain to her lateral right eyebrow, secondary to a laceration caused by a fall. Pt denies loc.     Review of patient's allergies indicates:  No Known Allergies  Past Medical History:   Diagnosis Date    Cerebral palsy     Gait abnormality     Low blood pressure

## 2025-02-23 NOTE — ED NOTES
CLEANSED AREA WITH VASHE AND STERILE WATER, PAT DRY, STERISTRIPS AND DERMABOND TO BEDSIDE. PATIENT EXPRESSED THANKS.

## 2025-02-26 ENCOUNTER — TELEPHONE (OUTPATIENT)
Dept: INTERNAL MEDICINE | Facility: CLINIC | Age: 40
End: 2025-02-26
Payer: MEDICAID

## 2025-02-26 NOTE — TELEPHONE ENCOUNTER
----- Message from Victoria sent at 2/26/2025 12:24 PM CST -----  Regarding: appt req  Contact: pt  Type: Appointment RequestCaller is requesting an appointment Name of Caller: ptReason for appointment:  ER Follow Up/Stitch removalWould the patient rather a call back or a response via [employer]? Call [facility] Call Back Number:   [phone] Information: Please call, Thank You

## 2025-02-26 NOTE — TELEPHONE ENCOUNTER
Spoke to pt's caregiver and pt who agreed to schedule with NP Radu for stitch removal of laceration of right eyebrow. Need override for Saturday clinic.

## 2025-03-04 ENCOUNTER — HOSPITAL ENCOUNTER (EMERGENCY)
Facility: HOSPITAL | Age: 40
Discharge: HOME | End: 2025-03-04
Attending: EMERGENCY MEDICINE
Payer: MEDICAID

## 2025-03-04 VITALS
TEMPERATURE: 98 F | BODY MASS INDEX: 20.38 KG/M2 | HEART RATE: 80 BPM | RESPIRATION RATE: 18 BRPM | SYSTOLIC BLOOD PRESSURE: 104 MMHG | WEIGHT: 115 LBS | HEIGHT: 63 IN | OXYGEN SATURATION: 99 % | DIASTOLIC BLOOD PRESSURE: 57 MMHG

## 2025-03-04 DIAGNOSIS — Z48.02 VISIT FOR SUTURE REMOVAL: Primary | ICD-10-CM

## 2025-03-04 DIAGNOSIS — Z51.89 VISIT FOR WOUND CHECK: ICD-10-CM

## 2025-03-04 PROCEDURE — 99999 HC NO LEVEL OF SERVICE - ED ONLY: CPT

## 2025-03-04 NOTE — ED PROVIDER NOTES
Encounter Date: 3/4/2025       History     Chief Complaint   Patient presents with    Suture / Staple Removal     Pt requesting suture removal to right forehead.     39-year-old female presents to ED for suture removal.  Sutures placed to lateral right eyebrow on .  She voices no complaints at this time    The history is provided by the patient.     Review of patient's allergies indicates:  No Known Allergies  Past Medical History:   Diagnosis Date    Cerebral palsy     Gait abnormality     Low blood pressure      Past Surgical History:   Procedure Laterality Date     SECTION      FLUOROSCOPIC URODYNAMIC STUDY N/A 2023    Procedure: URODYNAMIC STUDY, FLUOROSCOPIC;  Surgeon: Bev Davalos MD;  Location: [facility] OR [address] [place] [address];  Service: Urology;  Laterality: N/A;    LAPAROSCOPIC SALPINGECTOMY Bilateral 7/10/2020    Procedure: SALPINGECTOMY, LAPAROSCOPIC;  Surgeon: Rosa Mcclendon MD;  Location: [facility] OR;  Service: OB/GYN;  Laterality: Bilateral;  video confirmed  CW     No family history on file.  Social History[1]  Review of Systems   Skin:  Positive for wound.       Physical Exam     Initial Vitals [25 1251]   BP Pulse Resp Temp SpO2   (!) 104/57 80 18 98.3 °F (36.8 °C) 99 %      MAP       --         Physical Exam    Vitals reviewed.  Constitutional: Vital signs are normal. She appears well-developed and well-nourished. She is cooperative. She does not have a sickly appearance. She does not appear ill. No distress.   HENT:   Head: Normocephalic.   Laceration site to right lateral eyebrow well healed.  Single suture remaining intact   Eyes: EOM are normal.   Neck:   Normal range of motion.  Musculoskeletal:      Cervical back: Normal range of motion.     Neurological: She is alert and oriented to person, place, and time. GCS eye subscore is 4. GCS verbal subscore is 5. GCS motor subscore is 6.   Psychiatric: She has a normal mood and affect. Her speech is normal and behavior is normal.          ED Course   Suture Removal    Date/Time: 3/4/2025 1:02 PM  Location procedure was performed: [facility] EMERGENCY DEPARTMENT    Performed by: Amos Amato PA-C  Authorized by: Amos Amato PA-C  Body area: head/neck  Location details: right eyebrow  Wound Appearance: clean and well healed  Sutures Removed: 1  Staples Removed: 0  Post-removal: no dressing applied  Facility: sutures placed in this facility  Patient tolerance: Patient tolerated the procedure well with no immediate complications        Labs Reviewed - No data to display       Imaging Results    None          Medications - No data to display  Medical Decision Making  Patient presents for suture removal.  Afebrile.  Patient in no distress on exam    DDx:  Including but not limited to wound check, suture removal               ED Course as of 03/04/25 1312   Tue Mar 04, 2025   1303 Wound site appears to be healing appropriately.  Suture removed without complication.  Patient tolerated well.  Encouraged high fall precautions with outpatient follow-up.  Patient states her understanding and agrees with plan [KS]      ED Course User Index  [KS] Amos Amato PA-C                           Clinical Impression:  Final diagnoses:  [Z48.02] Visit for suture removal (Primary)  [Z51.89] Visit for wound check          ED Disposition Condition    Discharge Stable          ED Prescriptions    None       Follow-up Information       Follow up With Specialties Details Why Contact Info    Ivtet Cardoso MD Internal Medicine   [address] [place] [id number]  [phone]                 [1]   Social History  Tobacco Use    Smoking status: Never     Passive exposure: Never    Smokeless tobacco: Never   Substance Use Topics    Alcohol use: No    Drug use: No        Amos Amato PA-C  03/04/25 1312

## 2025-03-04 NOTE — ED NOTES
Pt reports fall several days ago requiring sutures to right eyebrow. Pt here for suture removal. Wound well healed with no redness or swelling to area.

## 2025-03-11 ENCOUNTER — OFFICE VISIT (OUTPATIENT)
Dept: PHYSICAL MEDICINE AND REHAB | Facility: CLINIC | Age: 40
End: 2025-03-11
Payer: MEDICAID

## 2025-03-11 VITALS — OXYGEN SATURATION: 98 % | BODY MASS INDEX: 20.39 KG/M2 | WEIGHT: 115.06 LBS | HEIGHT: 63 IN

## 2025-03-11 DIAGNOSIS — Z78.9 IMPAIRED MOBILITY AND ACTIVITIES OF DAILY LIVING: ICD-10-CM

## 2025-03-11 DIAGNOSIS — R29.6 FREQUENT FALLS: ICD-10-CM

## 2025-03-11 DIAGNOSIS — M62.81 MUSCLE WEAKNESS: ICD-10-CM

## 2025-03-11 DIAGNOSIS — R26.9 GAIT DISORDER: ICD-10-CM

## 2025-03-11 DIAGNOSIS — G11.9 CEREBELLAR ATAXIA: Primary | ICD-10-CM

## 2025-03-11 DIAGNOSIS — Z74.09 IMPAIRED MOBILITY AND ACTIVITIES OF DAILY LIVING: ICD-10-CM

## 2025-03-11 DIAGNOSIS — M62.838 MUSCLE SPASM: ICD-10-CM

## 2025-03-11 PROCEDURE — 99204 OFFICE O/P NEW MOD 45 MIN: CPT | Mod: S$PBB,,, | Performed by: PHYSICAL MEDICINE & REHABILITATION

## 2025-03-11 PROCEDURE — 1159F MED LIST DOCD IN RCRD: CPT | Mod: CPTII,,, | Performed by: PHYSICAL MEDICINE & REHABILITATION

## 2025-03-11 PROCEDURE — 3008F BODY MASS INDEX DOCD: CPT | Mod: CPTII,,, | Performed by: PHYSICAL MEDICINE & REHABILITATION

## 2025-03-11 PROCEDURE — 99213 OFFICE O/P EST LOW 20 MIN: CPT | Mod: PBBFAC | Performed by: PHYSICAL MEDICINE & REHABILITATION

## 2025-03-11 PROCEDURE — 99999 PR PBB SHADOW E&M-EST. PATIENT-LVL III: CPT | Mod: PBBFAC,,, | Performed by: PHYSICAL MEDICINE & REHABILITATION

## 2025-03-11 NOTE — PROGRESS NOTES
Subjective:       Patient ID: Medina Hernandez is a 39 y.o. female.    Chief Complaint: No chief complaint on file.    HPI     HISTORY OF PRESENT ILLNESS:  Ms. Hernandez is a 35-year-old female with past medical history of cerebellar ataxia, lower extremity weakness, muscle spasms, impaired gait and activities of daily living.  He was [facility].  She was last seen on 10/11/2021 for chronic low back pain with radicular symptoms and for a mobility assessment.  She was maintained on ibuprofen, gabapentin, p.r.n. baclofen and p.r.n. tramadol.  Mobility assessment was done and she was prescribed an electric scooter.  She was referred to Neurology for her cerebellar ataxia.    The patient was lost to follow-up physical Medicine [facility].  She also did not get an appointment with Neurology.  She is presenting today to the clinic for help with lower extremity spasms and to reorder her therapy.    She reports that her low back pain subsided.  Her pain score has been 0.  She denies any lower extremity radicular pain.  She continues to complain bilateral lower extremity weakness.  She denies any bowel incontinence.  Has bladder urgency (urge and stress) and was followed up by Urology.  She also has muscle spasms in both feet causing abnormal movement of her feet including plantar flexion.  They are spontaneous with no apparent triggers.  They happen couple times per hour.  They can be painful with a maximum score of 7-8/10 and minimum of 0/10.  She denies any fasciculations.  She denies any aphasia, dysarthria or dyspnea.    Functionally, he is independent with feeding after setup.  She is independent with dressing and toileting.  She requires supervision for bathing.  She ambulates few feet with a Rollator walker or holding onto walls and furniture.  She uses a scooter for longer distances.  She also has a manual wheelchair.  He has history of frequent falls,  sometimes couple times per week.  Her last fall was on 2/23/2025 at which time she hit her right forehead and sustained a laceration requiring a trip to the emergency department for suturing.  She has an aide coming to her house 5 days per week from 10:00 a.m. to 3:30 p.m..  She assist the patient with her activities of daily living including bathing homemaking.    The patient was seen by Dr. Camargo [facility] in 2014 for her cerebellar ataxia.  She has not followed up with him since.  She was referred again at her last visit into [facility] but did not make it either.  She reports that now she has an aide who is able to provide transportation to her medical visits into therapy if needed    She is currently on:   Baclofen tablets, 2 tablets in the morning  Ibuprofen 600 mg p.r.n. but infrequently  Tylenol 500 mg p.r.n. but infrequently.      Past Medical History:   Diagnosis Date    Cerebral palsy     Gait abnormality     Low blood pressure        Review of patient's allergies indicates:  No Known Allergies        Review of Systems   Constitutional:  Negative for chills and fever.   Eyes:  Negative for visual disturbance.   Respiratory:  Negative for shortness of breath.    Cardiovascular:  Negative for chest pain.   Gastrointestinal:  Positive for constipation. Negative for blood in stool, nausea and vomiting.   Genitourinary:  Positive for difficulty urinating.   Musculoskeletal:  Positive for gait problem. Negative for arthralgias, back pain and neck pain.   Neurological:  Positive for tremors and weakness. Negative for dizziness and headaches.   Psychiatric/Behavioral:  Negative for behavioral problems and sleep disturbance.        Objective:      Physical Exam  Vitals reviewed.   Constitutional:       Appearance: She is well-developed.      Comments: Coming to the clinic in a manual wheelchair.     HENT:      Head: Normocephalic and atraumatic.   Musculoskeletal:      Comments:  BUE:  ROM:full.  Strength:    RUE: 4/5 at shoulder abduction, 5 elbow flexion, 5 elbow extension, 5 hand .   LUE: 4/5 at shoulder abduction, 5 elbow flexion, 5 elbow extension, 5 hand .  Sensation to pinprick:   RUE: intact.   LUE: intact.  DTR:    RUE: +2 biceps, +2 triceps.   LUE:  +2 biceps, +2 triceps.      BLE:  ROM:full.  Increased plantar flexion tone at both ankles.  Strength:    RLE: 3-/5 at hip flexion, 4 knee extension, 3 ankle DF, 4 PF.   LLE: 3-/5 at hip flexion, 4 knee extension, 4 ankle DF, 4+ PF.  Sensation to pinprick:     RLE: intact.      LLE: intact.   DTR:     RLE: +3 knee, +2 ankle.    LLE: +3 knee, +2 ankle.  Clonus:    Rt ankle: +ve sustained.    Lt ankle: +ve sustained.         Skin:     General: Skin is warm.   Neurological:      Mental Status: She is alert and oriented to person, place, and time.   Psychiatric:         Behavior: Behavior normal.           Assessment:       1. Cerebellar ataxia    2. Muscle spasm    3. Gait disorder    4. Frequent falls    5. Impaired mobility and activities of daily living    6. Muscle weakness    Give mentioned 100 muscle morning clinic but did multi cause    Plan:         - Increase baclofen (LIORESAL) 20 MG tablet; Take 1 tablets (20 mg total) by mouth 3 (three) times daily.   Sat bone- Continue  ibuprofen (ADVIL,MOTRIN) 600 MG tablet; Take 1 tablet (600 mg total) by mouth 2 (two) times daily as needed for moderate pain.  - Continue/increase acetaminophen (TYLENOL) 500 MG tablet; Take 1-2 tablets (500-1,000 mg total) by mouth 3 (three) times daily as needed for mild pain.  - Ambulatory referral/consult to Neurology for follow-up for cerebellar ataxia.  - Ambulatory referral to physical therapy for help with gait mobility, stretch her heel cords.  - Follow up in about 4 months (around 7/11/2025).      This was a 45 minute visit  (including review of records), 50% of which was spent educating the patient about the diagnosis and the treatment  plan.    This note was partly generated with [employer] voice recognition software. I apologize for any possible typographical errors.

## 2025-03-25 NOTE — PROGRESS NOTES
Outpatient Rehab    Occupational Therapy Evaluation (only)    Patient Name: Medina Hernandez  MRN: [id number]  YOB: 1985  Encounter Date: 3/27/2025    Therapy Diagnosis:   Encounter Diagnoses   Name Primary?    Cerebellar ataxia     Gait disorder     Muscle weakness     Decreased strength of upper extremity Yes    Decreased coordination     Fine motor impairment     Deficits in activities of daily living     Impaired instrumental activities of daily living      Physician: Shiloh rAchuleta MD    Physician Orders: Eval and Treat  Medical Diagnosis: Cerebellar ataxia  Gait disorder  Muscle weakness    Visit # / Visits Authorized: 1 / 1  Insurance Authorization Period: 3/11/2025 to 3/11/2026  Date of Evaluation: 3/27/2025  Plan of Care Certification: 3/27/2025 to 5/22/2025     Time In: 1345   Time Out: 1420  Total Time: 35   Total Billable Time: 35    Intake Outcome Measure for FOTO Survey    Therapist reviewed FOTO scores for Medina Hernandez on 3/27/2025.   FOTO report - see Media section or FOTO account episode details.     Intake Score: not obtained at eval         Subjective   History of Present Illness  Medina is a 39 y.o. female who reports to occupational therapy with a chief concern of decreased independence with transfers.     The patient reports a medical diagnosis of Cerebellar ataxia (G11.9), Gait disorder (R26.9), Muscle weakness (M62.81).            Dominant Hand: Right  History of Present Condition/Illness: Pt is 39 year old female who presents with cerebellar ataxia. Pt with PHM of lower extremity weakness, muscle spasms, impaired gait and activities of daily living. Pt presents with decreased sitting balance, decreased coordination, decreased strength, and decreased self-care/transfer status. Pt receives assistance from caregiver and has received home health therapy services in the past.     Activities of Daily Living  Social history was obtained from Patient and Other (Comment).  caregiver                   Currently independent with activities of daily living? No  Activities currently needing assistance include Dressing - upper body, Dressing - lower body, Functional mobility, Transfers, and Toileting.   SPV for bathing          Currently independent with instrumental activities of daily living? No  Activities currently needing assistance include: Driving, Financial management, Grocery/shopping, Meal prep, Home establishment and management, and Community mobility.   Leisure activities: Fishing, spending time with 14 yo son        Pain  No Pain Reported: Yes                 Living Arrangements  Living Situation  Housing: Home with care/services    Home Setup  Type of Structure: House  Home Access: Ramped entrance  Number of Levels in Home: One level  Primary Bedroom: 1st floor  Primary Bathroom: 1st floor  Bathroom Shower/Tub: Walk-in shower  Bathroom Equipment: Shower chair with back  Kitchen: 1st floor  Laundry: 1st floor    Equipment/Treatments  Mobility Equipment: Manual wheelchair, Power wheelchair, Electric scooter, Wheeled walker        Employment  Patient does not report that: Does the patient's condition impact their ability to work?             Past Medical History/Physical Systems Review:   Medina Hernandez  has a past medical history of Cerebral palsy, Gait abnormality, and Low blood pressure.    Medina Hernandez  has a past surgical history that includes  section; Laparoscopic salpingectomy (Bilateral, 7/10/2020); and Fluoroscopic urodynamic study (N/A, 2023).    Medina has a current medication list which includes the following prescription(s): acetaminophen, baclofen, calcium carbonate, cyanocobalamin, darifenacin, ibuprofen, and phenazopyridine.    Review of patient's allergies indicates:  No Known Allergies     Objective   Cognition   Orientation level is Oriented x4.                                                          Shoulder Range of Motion     Shoulder, Elbow,  or Forearm Range of Motion Details: BUE shoulder/elbow/forearm ROM WFL    Wrist Range of Motion     BUE wrist ROM WNL       BUE hand AROM WNL              Shoulder Strength - Planes of Motion   Right Strength Left Strength   Flexion 4 4   ABduction 4 4       Elbow Strength   Right Strength Left Strength   Flexion (C6) 5 5   Extension (C7) 5 5        Forearm Strength   Right Strength Left Strength   Pronation 4 4   Supination 4 4       Wrist Strength - Planes of Motion   Right Strength Left Strength   Flexion 5 5   Extension 5 5     Right  Strength  Right Hand Dynamometer Position: 2  Elbow Position Forearm Position Trial 1 (lbs) Trial 2  (lbs) Trial 3  (lbs) Average  (lbs)   Flexed Neutral 45 35 45 41.67       Left  Strength  Left Hand Dynamometer Position: 2  Elbow Position Forearm Position Trial 1 (lbs) Trial 2 (lbs) Trial 3 (lbs) Average (lbs)   Flexed Neutral 40 35 30 35       Right Pinch Strength   Trial 1 (lbs)   Lateral (Key Pinch) 15   Three Point (Three Jaw Adam) 12   Two Point (Tip to Tip) 9       Left Pinch Strength   Trial 1 (lbs)   Lateral (Key Pinch) 12   Three Point (Three Jaw Adam) 9   Two Point (Tip to Tip) 5              Wrist/Hand Special Tests  Hand Coordination Special Tests  Right 9-Hole Peg Test (sec): 77  Left 9-Hole Peg Test (sec): 86       Coordination  Coordination Tests  Impaired: Right Rapid Alternating Movements (Pronation/Supination), Right Rapid Alternating Movements (Digit Opposition), Left Rapid Alternating Movements (Pronation/Supination), and Left Rapid Alternating Movements (Digit Opposition)  Right 9-Hole Peg Test (sec): 77  Left 9-Hole Peg Test (sec): 86  Slow speed of movements           Transfers Assessment  Bed to Chair Assistance: Moderate assist  Bed to Chair Assistance Details: squat pivot           Time Entry(in minutes):  OT Evaluation (Moderate) Time Entry: 35    Assessment & Plan   Assessment  Medina presents with a condition of Moderate complexity.            ADL Limitations : Bathing/showering, Dressing, Functional mobility, Toileting and toilet hygiene  IADL Limitations: Community mobility, Driving, Financial management, Grocery/shopping, Home establishment and management, Meal preparation and cleanup  Leisure Limitations: Leisure participation  Functional Limitations: Activity tolerance, Carrying objects, Fine motor coordination, Functional mobility, Increased risk of fall, Manipulating objects, Reaching, Transfers, Standing tolerance, Sitting tolerance         Personal Factors Affecting Prognosis: Physical limitations       Evaluation/Treatment Response: Patient responded to treatment well  Patient Goal for Therapy (OT): improve participation in functional transfers  Prognosis: Good  Assessment Details: Patient would benefit from skilled outpatient occupational therapy to address the deficits listed in the problem list, to provide patient/family education, and to maximize patient's level of independence in the home and community environment.      Plan  From an occupational therapy perspective, the patient would benefit from: Skilled Rehab Services    Planned therapy interventions include: Therapeutic exercise, Therapeutic activities, ADLs/IADLs, Neuromuscular re-education, and Community/work reintegration.    Planned modalities to include: Cryotherapy (cold pack), Electrical stimulation - attended, Paraffin bath, Ultrasound, Thermotherapy (hot pack), and Fluidotherapy.        Visit Frequency: 2 times Per Week for 8 Weeks.       This plan was discussed with Patient and Caregiver.   Discussion participants: Agreed Upon Plan of Care             Patient's spiritual, cultural, and educational needs considered and patient agreeable to plan of care and goals.     Education  Education was done with Patient and Other recipient present. The patient's learning style includes Listening. The patient Verbalizes understanding.  They identified as Caregiver. The reported  learning style is Listening. The recipient Verbalizes understanding.     Role of OT and OT POC       Goals:   Active       LTG       Pt will be independent with Home Exercise Program (HEP)/Home Activity Program (HAP) to promote long term maintenance of progress made in therapy.         Start:  03/27/25    Expected End:  05/22/25            Pt will transfer w/c <-> EOM w/ SPV in order to increase I w/ functional transfers.        Start:  03/27/25    Expected End:  05/22/25            Pt will demonstrated dynamic sitting task for 10 mins w/ SPV to increase trunk stability required for participation in functional tasks.        Start:  03/27/25    Expected End:  05/22/25            Pt will decrease time on 9-hole peg by 5 seconds bilaterally in order to increase FM control/coordination required for participation in functional tasks.        Start:  03/27/25    Expected End:  05/22/25            Pt/caregiver will verbalize understanding of and obtain any DME/adaptive equipment needed for improved independence w/ ADLs and functional transfers.        Start:  03/27/25    Expected End:  05/22/25               STG       Pt/caregiver will report 50% compliance with Home Exercise Program (HEP)/Home Activity Program (HAP) to promote long term maintenance of progress made in therapy.         Start:  03/27/25    Expected End:  04/24/25            Pt will transfer w/c <-> EOM w/ min A in order to increase I w/ functional transfers.        Start:  03/27/25    Expected End:  04/24/25            Pt will demonstrated dynamic sitting task for 5 mins w/ CGA to increase trunk stability required for participation in functional tasks.        Start:  03/27/25    Expected End:  04/24/25                Francine Toscano OT

## 2025-03-26 ENCOUNTER — OFFICE VISIT (OUTPATIENT)
Facility: CLINIC | Age: 40
End: 2025-03-26
Payer: MEDICAID

## 2025-03-26 DIAGNOSIS — G11.9 CEREBELLAR ATAXIA: ICD-10-CM

## 2025-03-26 DIAGNOSIS — G24.9 DYSTONIA: Primary | ICD-10-CM

## 2025-03-26 DIAGNOSIS — R26.9 GAIT DISORDER: ICD-10-CM

## 2025-03-26 DIAGNOSIS — M62.81 MUSCLE WEAKNESS: ICD-10-CM

## 2025-03-26 NOTE — PROGRESS NOTES
"MOVEMENT DISORDERS CLINIC NEW CONSULT NOTE    PCP/Referring Provider:   Shiloh Archuleta MD  [address] [place] [address]  Date of Service: 3/26/2025      Virtual Visit: Patient currently located in [place], [place]    Chief Complaint: Ataxia     HPI: Medina Hernandez is a  39 y.o. female with PMH most notable for ataxia , presenting for evaluation.    Patient previously seen by Dr. Camargo in 2014. Had an ataxic syndrome with superimposed dystonic gait. Had a reversible causes screen that was unrevealing. Slow motor milestones. Needed extra help in school. No family history.     Caretaker helps with visit. Notes she has gone downhill. Now needs scooter to get around the house. Started using it 2-3 years ago. Muscles are "locked up" and "dont straighten out correctly."     She just saw PMR and baclofen was increased to 20 mg TID.    She starts physical therapy tomorrow.     No swallowing problems that they endorse.     No major speech changes.     Current Medications:  Encounter Medications[1]    Past Medical History:  Problem List[2]    Past Surgical History:  Past Surgical History:   Procedure Laterality Date     SECTION      FLUOROSCOPIC URODYNAMIC STUDY N/A 2023    Procedure: URODYNAMIC STUDY, FLUOROSCOPIC;  Surgeon: Bev Davalos MD;  Location: [address];  Service: Urology;  Laterality: N/A;    LAPAROSCOPIC SALPINGECTOMY Bilateral 7/10/2020    Procedure: SALPINGECTOMY, LAPAROSCOPIC;  Surgeon: Rosa Mcclendon MD;  Location: [facility];  Service: OB/GYN;  Laterality: Bilateral;  video confirmed        Social:  Social History[3]    Family History:  No family history on file.    PHYSICAL:  Virtual - no vitals      General Medical Examination:  General: Good hygiene, appropriate appearance.  HEENT: Normocephalic, atraumatic.   Chest: Unlabored breathing.   Ext: No clubbing, cyanosis, or edema.     Mental Status:  Mood/Affect: Appropriate/congruent.  Level of consciousness: Awake, " "alert.  Orientation: Oriented to person, place, time and situation.  Language: Normal fluency, able to name, repeat, and follow complex multi-step commands    Cranial nerves:  III, IV, VI: EOMI with conjugate gaze and no nystagmus on end gaze  VII: Facial muscle activation intact and symmetric over the bilateral upper and lower face    Motor:   BUE without drift    Gait:  Severe scissoring during gait exam    Laboratory Data:    Lab Results   Component Value Date    TSH 1.310 03/13/2024     No results found for: "[id number]"      Imaging:    CT Head 2023      MRI Brain 2013    Assessment//Plan:   Problem List Items Addressed This Visit          Orthopedic    Muscle weakness       Other    Gait disorder     Other Visit Diagnoses         Dystonia    -  Primary      Cerebellar ataxia              Patient with cerebellar and dystonic syndrome since birth. Genetic vs possible CP type picture. Does have some worsening of atrophy of the cerebellum on CT head but this does not necessarily sway me one way or another etiology.    No family history and she did have delayed milestones.     I will have her present for an in person evaluation and consider ataxia clinic. Will discuss genetic testing as well.    I spent 45 minutes on her care today.          Jameel Knapp MD  [facility]  Department of Neurology  Movement Disorders             [1]   Outpatient Encounter Medications as of 3/26/2025   Medication Sig Dispense Refill    acetaminophen (TYLENOL) 500 MG tablet Take 1-2 tablets (500-1,000 mg total) by mouth 3 (three) times daily as needed for Pain.  0    baclofen (LIORESAL) 20 MG tablet TAKE 1 TABLET BY MOUTH THREE TIMES DAILY 90 tablet 2    calcium carbonate (CALTRATE 600 ORAL) Take by mouth once daily.      cyanocobalamin (VITAMIN B-12) 500 MCG tablet Take 500 mcg by mouth once daily.      darifenacin (ENABLEX) 7.5 MG Tb24 Take 1 tablet (7.5 mg total) by mouth once daily. 30 tablet 11    ibuprofen (ADVIL,MOTRIN) " 600 MG tablet Take 1 tablet (600 mg total) by mouth every 6 (six) hours as needed for Pain (pain). 30 tablet 1    phenazopyridine (PYRIDIUM) 200 MG tablet Take 1 tablet (200 mg total) by mouth every meal as needed for Pain. 10 tablet 0     No facility-administered encounter medications on file as of 3/26/2025.   [2]   Patient Active Problem List  Diagnosis    Gait disorder    Leg weakness, bilateral    Numbness of foot    LBP (low back pain)    Muscle weakness    Cerebral palsy    Iron deficiency    Encounter for sterilization    Stiffness in joint    Impaired mobility and activities of daily living    Pelvic floor weakness in female    Urinary urgency    Microscopic hematuria   [3]   Social History  Socioeconomic History    Marital status:     Number of children: 1   Tobacco Use    Smoking status: Never     Passive exposure: Never    Smokeless tobacco: Never   Substance and Sexual Activity    Alcohol use: No    Drug use: No    Sexual activity: Yes     Partners: Male     Birth control/protection: Condom, Implant     Social Drivers of Health     Financial Resource Strain: Low Risk  (3/21/2025)    Overall Financial Resource Strain (CARDIA)     Difficulty of Paying Living Expenses: Not very hard   Food Insecurity: No Food Insecurity (3/21/2025)    Hunger Vital Sign     Worried About Running Out of Food in the Last Year: Never true     Ran Out of Food in the Last Year: Never true   Transportation Needs: No Transportation Needs (3/21/2025)    PRAPARE - Transportation     Lack of Transportation (Medical): No     Lack of Transportation (Non-Medical): No   Physical Activity: Inactive (3/21/2025)    Exercise Vital Sign     Days of Exercise per Week: 0 days     Minutes of Exercise per Session: 0 min   Stress: No Stress Concern Present (3/21/2025)    [employer] - Occupational Stress Questionnaire     Feeling of Stress : Only a little   Housing Stability: Low Risk  (3/21/2025)    Housing  Stability Vital Sign     Unable to Pay for Housing in the Last Year: No     Number of Times Moved in the Last Year: 0     Homeless in the Last Year: No

## 2025-03-27 ENCOUNTER — CLINICAL SUPPORT (OUTPATIENT)
Dept: REHABILITATION | Facility: HOSPITAL | Age: 40
End: 2025-03-27
Payer: MEDICAID

## 2025-03-27 DIAGNOSIS — M62.81 MUSCLE WEAKNESS: ICD-10-CM

## 2025-03-27 DIAGNOSIS — R29.898 FINE MOTOR IMPAIRMENT: ICD-10-CM

## 2025-03-27 DIAGNOSIS — R26.9 GAIT DISORDER: ICD-10-CM

## 2025-03-27 DIAGNOSIS — R29.818 FINE MOTOR IMPAIRMENT: ICD-10-CM

## 2025-03-27 DIAGNOSIS — Z78.9 DEFICITS IN ACTIVITIES OF DAILY LIVING: ICD-10-CM

## 2025-03-27 DIAGNOSIS — Z78.9 IMPAIRED INSTRUMENTAL ACTIVITIES OF DAILY LIVING: ICD-10-CM

## 2025-03-27 DIAGNOSIS — R27.8 DECREASED COORDINATION: ICD-10-CM

## 2025-03-27 DIAGNOSIS — R29.898 DECREASED STRENGTH OF UPPER EXTREMITY: Primary | ICD-10-CM

## 2025-03-27 DIAGNOSIS — G11.9 CEREBELLAR ATAXIA: ICD-10-CM

## 2025-03-27 PROCEDURE — 97166 OT EVAL MOD COMPLEX 45 MIN: CPT | Mod: PN

## 2025-03-31 ENCOUNTER — TELEPHONE (OUTPATIENT)
Facility: CLINIC | Age: 40
End: 2025-03-31
Payer: MEDICAID

## 2025-03-31 NOTE — TELEPHONE ENCOUNTER
----- Message from Lorena Parikh PA-C sent at 3/31/2025  8:12 AM CDT -----  Regarding: FW: Ataxic patient  Can we get this patient in for a new patient visit with me first and then we can get her into the multi-d ataxia clinic?  ----- Message -----  From: Jameel Knapp MD  Sent: 3/26/2025   1:28 PM CDT  To: Lorena Parikh PA-C  Subject: Ataxic patient                                   Another patient seen in the past with a cerebellar syndrome. Also prominent dystonic appearing gait. Has declined since 2014. Ataxia clinic?KP  
Appt 05/2025  
normal...

## 2025-04-01 NOTE — PROGRESS NOTES
Outpatient Rehab    Occupational Therapy Visit    Patient Name: Medina Hernandez  MRN: [id number]  YOB: 1985  Encounter Date: 4/2/2025    Therapy Diagnosis:   Encounter Diagnoses   Name Primary?    Cerebellar ataxia Yes    Decreased strength of upper extremity     Decreased independence with transfers      Physician: Shiloh Archuleta MD    Physician Orders: Eval and Treat  Medical Diagnosis: Cerebellar ataxia  Gait disorder  Muscle weakness    Visit # / Visits Authorized: 1 / 12  Insurance Authorization Period: 3/27/2025 to 5/26/2025  Date of Evaluation: 3/27/2025  Plan of Care Certification: 3/27/2025 to 5/22/2025     Time In: 1300   Time Out: 1338  Total Time: 38   Total Billable Time: 38         Subjective      Family / care giver present for this visit:   Pain reported as 0/10.      Objective       No measures obtained this date.     Treatment:    Pt completed ther ex to address UE strength and endurance required for participation in ADLs and functional transfers. Pt demonstrated:  Therapeutic Exercise  TE 1: [employer] UBE for 3 mins forward cycling and 3 mins backward cycling at level 1.0, while seated in w/c    Pt completed ther act to address core strength and increase I w/ functional transfers. Pt demonstrated:  Therapeutic Activity  TA 1: seated EOM lateral lean to tricep push-up for 10 reps x 2  TA 2: supine bridges for 10 reps x 3  TA 3: supine figure-4 stretch, requiring mod A to position LEs  TA 4: sit <-> supine x 2 trials, requiring verbal/tactile cues for sequencing and adaptive techniques  TA 5: w/c <-> EOM squat pivot w/ mod A    Time Entry(in minutes):  Therapeutic Activity Time Entry: 38    Assessment & Plan   Assessment: Pt completed therapeutic ctivities and therapeutic exercises w/ fair energy and good participation. Pt demonstrated fair (-) overall strength/endurance, taking rest breaks as needed. Pt required verbal cues for safety and hand placement during functional  transfers.  Evaluation/Treatment Tolerance: Patient tolerated treatment well    Patient will continue to benefit from skilled outpatient occupational therapy to address the deficits listed in the problem list box on initial evaluation, provide pt/family education and to maximize pt's level of independence in the home and community environment.     Patient's spiritual, cultural, and educational needs considered and patient agreeable to plan of care and goals.     Education  Education was done with Patient and Other recipient present. The patient's learning style includes Listening. The patient Demonstrates understanding and Verbalizes understanding.  They identified as Caregiver. The reported learning style is Listening. The recipient Verbalizes understanding.     Transfer techniques for sit <-> supine and w/c <-> EOM, UE AROM HEP, theraputty HEP       Plan: continue OT POC    Goals:   Active       LTG       Pt will be independent with Home Exercise Program (HEP)/Home Activity Program (HAP) to promote long term maintenance of progress made in therapy.   (Progressing)       Start:  03/27/25    Expected End:  05/22/25            Pt will transfer w/c <-> EOM w/ SPV in order to increase I w/ functional transfers.  (Progressing)       Start:  03/27/25    Expected End:  05/22/25            Pt will demonstrated dynamic sitting task for 10 mins w/ SPV to increase trunk stability required for participation in functional tasks.  (Progressing)       Start:  03/27/25    Expected End:  05/22/25            Pt will decrease time on 9-hole peg by 5 seconds bilaterally in order to increase FM control/coordination required for participation in functional tasks.  (Progressing)       Start:  03/27/25    Expected End:  05/22/25            Pt/caregiver will verbalize understanding of and obtain any DME/adaptive equipment needed for improved independence w/ ADLs and functional transfers.  (Progressing)       Start:  03/27/25    Expected  End:  05/22/25               STG       Pt/caregiver will report 50% compliance with Home Exercise Program (HEP)/Home Activity Program (HAP) to promote long term maintenance of progress made in therapy.   (Progressing)       Start:  03/27/25    Expected End:  04/24/25            Pt will transfer w/c <-> EOM w/ min A in order to increase I w/ functional transfers.  (Progressing)       Start:  03/27/25    Expected End:  04/24/25            Pt will demonstrated dynamic sitting task for 5 mins w/ CGA to increase trunk stability required for participation in functional tasks.  (Progressing)       Start:  03/27/25    Expected End:  04/24/25                Francine Toscano OT

## 2025-04-02 ENCOUNTER — CLINICAL SUPPORT (OUTPATIENT)
Dept: REHABILITATION | Facility: HOSPITAL | Age: 40
End: 2025-04-02
Payer: MEDICAID

## 2025-04-02 DIAGNOSIS — Z74.09 DECREASED INDEPENDENCE WITH TRANSFERS: ICD-10-CM

## 2025-04-02 DIAGNOSIS — R29.898 DECREASED STRENGTH OF UPPER EXTREMITY: ICD-10-CM

## 2025-04-02 DIAGNOSIS — G11.9 CEREBELLAR ATAXIA: Primary | ICD-10-CM

## 2025-04-02 PROCEDURE — 97530 THERAPEUTIC ACTIVITIES: CPT | Mod: PN

## 2025-04-03 ENCOUNTER — TELEPHONE (OUTPATIENT)
Dept: PHYSICAL MEDICINE AND REHAB | Facility: CLINIC | Age: 40
End: 2025-04-03
Payer: MEDICAID

## 2025-04-03 NOTE — TELEPHONE ENCOUNTER
----- Message from Ashley sent at 4/2/2025 12:50 PM CDT -----  Regarding: Referral Req  Pt is calling to speak to someone in the office to request a referral. No referral in Epic to schedule from. Pt is asking for a return call back. Thanks. Referral being requested: Physical Therapy

## 2025-04-04 ENCOUNTER — PATIENT MESSAGE (OUTPATIENT)
Dept: UROLOGY | Facility: CLINIC | Age: 40
End: 2025-04-04

## 2025-04-04 ENCOUNTER — OFFICE VISIT (OUTPATIENT)
Dept: UROLOGY | Facility: CLINIC | Age: 40
End: 2025-04-04
Payer: MEDICAID

## 2025-04-04 VITALS
WEIGHT: 114.63 LBS | HEIGHT: 63 IN | HEART RATE: 78 BPM | DIASTOLIC BLOOD PRESSURE: 53 MMHG | BODY MASS INDEX: 20.31 KG/M2 | SYSTOLIC BLOOD PRESSURE: 89 MMHG

## 2025-04-04 DIAGNOSIS — N39.41 URGE INCONTINENCE: ICD-10-CM

## 2025-04-04 DIAGNOSIS — K59.00 CONSTIPATION, UNSPECIFIED CONSTIPATION TYPE: ICD-10-CM

## 2025-04-04 DIAGNOSIS — G80.9 CEREBRAL PALSY, UNSPECIFIED TYPE: Primary | ICD-10-CM

## 2025-04-04 PROCEDURE — 99213 OFFICE O/P EST LOW 20 MIN: CPT | Mod: PBBFAC | Performed by: UROLOGY

## 2025-04-04 PROCEDURE — 99999 PR PBB SHADOW E&M-EST. PATIENT-LVL III: CPT | Mod: PBBFAC,,, | Performed by: UROLOGY

## 2025-04-04 RX ORDER — DARIFENACIN 7.5 MG/1
7.5 TABLET, EXTENDED RELEASE ORAL DAILY
Qty: 30 TABLET | Refills: 11 | Status: SHIPPED | OUTPATIENT
Start: 2025-04-04

## 2025-04-04 NOTE — PATIENT INSTRUCTIONS
For good gut health:    Eat 30 different fruits, vegetables, nuts and seeds a week.    3 fermented foods daily.    Fermented foods:   Yogurt, kefir, kombucha, anything pickled in the refrigerated area (pickles, sauerkraut, everett chi)       Squatty Potty step for the toilet     Chux pads (washable pads) available at [employer]

## 2025-04-04 NOTE — PROGRESS NOTES
History of Present Illness    CHIEF COMPLAINT:  Ms. Hernandez presents today for follow up on incomplete bladder emptying, urge incontinence.      GENITOURINARY:  She performs nightly catheterization before bed, administered by her . A trained caregiver is available for daytime catheterization if needed. Randa, who helps her during the day accompanies her to the appointment today.   She reports normal urination patterns, using either the bathroom or pull-up with changes as needed. She continues Darifenacin with good therapeutic effect and tolerates it well.  She uses 2 pull ups a day, is dry for the most part.     GASTROINTESTINAL:  She reports ongoing constipation.    Intermittent catheterization  Catheter type:  closed system catheter  Size:  Frequency:  every other day.    ICD-10 Diagnosis code(s):  incomplete bladder emptying  History of Recurrent UTI?:  not recently  Is this indefinite?:  yes    ROS:  General: -fever, -chills, -fatigue, -weight gain, -weight loss  Eyes: -vision changes, -redness, -discharge  ENT: -ear pain, -nasal congestion, -sore throat  Cardiovascular: -chest pain, -palpitations, -lower extremity edema  Respiratory: -cough, -shortness of breath  Gastrointestinal: -abdominal pain, -nausea, -vomiting, -diarrhea, +constipation, -blood in stool  Genitourinary: -dysuria, -hematuria, -frequency  Musculoskeletal: -joint pain, -muscle pain  Skin: -rash, -lesion  Neurological: -headache, -dizziness, -numbness, -tingling  Psychiatric: -anxiety, -depression, -sleep difficulty  Female Genitourinary: +urinary incontinence             Past Medical History:   Diagnosis Date    Cerebral palsy     Gait abnormality     Low blood pressure        Past Surgical History:   Procedure Laterality Date     SECTION      FLUOROSCOPIC URODYNAMIC STUDY N/A 2023    Procedure: URODYNAMIC STUDY, FLUOROSCOPIC;  Surgeon: Bev Davalos MD;  Location: [facility] OR [address] [place] [address];  Service: Urology;  Laterality: N/A;     LAPAROSCOPIC SALPINGECTOMY Bilateral 7/10/2020    Procedure: SALPINGECTOMY, LAPAROSCOPIC;  Surgeon: Rosa Mcclendon MD;  Location: [facility] OR;  Service: OB/GYN;  Laterality: Bilateral;  video confirmed 7/9 CW       No family history on file.    Social History[1]    Allergies:  Patient has no known allergies.    Medications:  Encounter Medications[2]      PHYSICAL EXAMINATION:    The patient generally appears in good health, is appropriately interactive, and is in no apparent distress.    Skin: No lesions.    Mental: Cooperative with normal affect.    Neuro: Grossly intact.    HEENT: Normal. No evidence of lymphadenopathy.    Chest:  normal inspiratory effort.    Extremities: No clubbing, cyanosis, or edema      LABS:    Lab Results   Component Value Date    BUN 9 03/13/2024    CREATININE 0.7 03/13/2024       Assessment & Plan    N31.9 Neuromuscular dysfunction of bladder, unspecified  K59.09 Other constipation    IMPRESSION:  - Continued Darifenacin for bladder control, noting good efficacy and tolerability without cognitive side effects.      CONSTIPATION AND GUT HEALTH:  - Discussed the benefits of fermented foods for gut bacteria and overall health, recommending incorporation of yogurt, kefir, kombucha, and homemade kimchi into diet.  - Recommend dietary changes to improve constipation and overall gut health, focusing on increased variety of fruits, vegetables, and fermented foods, aiming for 30 different plant-based foods per week.    URGE INCONTINENCE:  - Continued Darifenacin for bladder control, noting good efficacy and tolerability without cognitive side effects.  - Suggested use of a squatty potty to improve toileting posture and ease bowel movements for easier urination and defecation.  - Considered washable bed pads (Chux pads) for nighttime protection and environmental sustainability as an alternative to disposable options.    INCOMPLETE BLADDER EMPTYING:  - Ms. Hernandez to send message through "[employer] with  brand information of current catheters for renewal.       I spent a total of 30 minutes on the day of the visit.  This includes face to face time and non-face to face time preparing to see the patient (eg, review of tests), obtaining and/or reviewing separately obtained history, documenting clinical information in the electronic or other health record, independently interpreting results and communicating results to the patient/family/caregiver, or care coordinator.    Visit complexity today is associated with medical care services that are part of the ongoing care related to the single serious and/or complex condition of Urgency urinary incontinence (UUI) and incomplete bladder emptying . A longitudinal relationship exists or is being developed between the patient and this practitioner for the care of this condition.     This note was generated with the assistance of ambient listening technology. Verbal consent was obtained by the patient and accompanying visitor(s) for the recording of patient appointment to facilitate this note. I attest to having reviewed and edited the generated note for accuracy, though some syntax or spelling errors may persist. Please contact the author of this note for any clarification.             [1]   Social History  Socioeconomic History    Marital status:     Number of children: 1   Tobacco Use    Smoking status: Never     Passive exposure: Never    Smokeless tobacco: Never   Substance and Sexual Activity    Alcohol use: No    Drug use: No    Sexual activity: Yes     Partners: Male     Birth control/protection: Condom, Implant     Social Drivers of Health     Financial Resource Strain: Low Risk  (3/21/2025)    Overall Financial Resource Strain (CARDIA)     Difficulty of Paying Living Expenses: Not very hard   Food Insecurity: No Food Insecurity (3/21/2025)    Hunger Vital Sign     Worried About Running Out of Food in the Last Year: Never true     Ran Out of Food in the Last  Year: Never true   Transportation Needs: No Transportation Needs (3/21/2025)    PRAPARE - Transportation     Lack of Transportation (Medical): No     Lack of Transportation (Non-Medical): No   Physical Activity: Inactive (3/21/2025)    Exercise Vital Sign     Days of Exercise per Week: 0 days     Minutes of Exercise per Session: 0 min   Stress: No Stress Concern Present (3/21/2025)    [employer] - Occupational Stress Questionnaire     Feeling of Stress : Only a little   Housing Stability: Low Risk  (3/21/2025)    Housing Stability Vital Sign     Unable to Pay for Housing in the Last Year: No     Number of Times Moved in the Last Year: 0     Homeless in the Last Year: No   [2]   Outpatient Encounter Medications as of 4/4/2025   Medication Sig Dispense Refill    acetaminophen (TYLENOL) 500 MG tablet Take 1-2 tablets (500-1,000 mg total) by mouth 3 (three) times daily as needed for Pain.  0    baclofen (LIORESAL) 20 MG tablet TAKE 1 TABLET BY MOUTH THREE TIMES DAILY 90 tablet 2    calcium carbonate (CALTRATE 600 ORAL) Take by mouth once daily.      cyanocobalamin (VITAMIN B-12) 500 MCG tablet Take 500 mcg by mouth once daily.      ibuprofen (ADVIL,MOTRIN) 600 MG tablet Take 1 tablet (600 mg total) by mouth every 6 (six) hours as needed for Pain (pain). 30 tablet 1    [DISCONTINUED] darifenacin (ENABLEX) 7.5 MG Tb24 Take 1 tablet (7.5 mg total) by mouth once daily. 30 tablet 11    darifenacin (ENABLEX) 7.5 MG Tb24 Take 1 tablet (7.5 mg total) by mouth once daily. 30 tablet 11    [DISCONTINUED] albuterol (PROVENTIL/VENTOLIN HFA) 90 mcg/actuation inhaler INHALE 1 TO 2 PUFFS INTO THE LUNGS EVERY 6 HOURS AS NEEDED 18 g 0    [DISCONTINUED] clobetasol 0.05% (TEMOVATE) 0.05 % Oint Apply topically once daily. 30 g 3    [DISCONTINUED] lidocaine HCL 2% (XYLOCAINE) 2 % jelly Apply topically 3 (three) times daily. Apply to affected area three times daily prn 30 mL 1    [DISCONTINUED] phenazopyridine  (PYRIDIUM) 200 MG tablet Take 1 tablet (200 mg total) by mouth every meal as needed for Pain. 10 tablet 0     No facility-administered encounter medications on file as of 4/4/2025.

## 2025-04-08 ENCOUNTER — CLINICAL SUPPORT (OUTPATIENT)
Dept: REHABILITATION | Facility: HOSPITAL | Age: 40
End: 2025-04-08
Payer: MEDICAID

## 2025-04-08 DIAGNOSIS — R27.8 DECREASED COORDINATION: ICD-10-CM

## 2025-04-08 DIAGNOSIS — Z74.09 DECREASED INDEPENDENCE WITH TRANSFERS: ICD-10-CM

## 2025-04-08 DIAGNOSIS — G11.9 CEREBELLAR ATAXIA: Primary | ICD-10-CM

## 2025-04-08 DIAGNOSIS — R29.898 DECREASED STRENGTH OF UPPER EXTREMITY: ICD-10-CM

## 2025-04-08 PROCEDURE — 97530 THERAPEUTIC ACTIVITIES: CPT | Mod: PN

## 2025-04-08 NOTE — PROGRESS NOTES
Outpatient Rehab    Occupational Therapy Visit    Patient Name: Medina Hernandez  MRN: [id number]  YOB: 1985  Encounter Date: 4/8/2025    Therapy Diagnosis:   Encounter Diagnoses   Name Primary?    Cerebellar ataxia Yes    Decreased strength of upper extremity     Decreased independence with transfers     Decreased coordination      Physician: Shiloh Archuleta MD    Physician Orders: Eval and Treat  Medical Diagnosis: Cerebellar ataxia  Gait disorder  Muscle weakness    Visit # / Visits Authorized: 2 / 12  Insurance Authorization Period: 3/27/2025 to 5/26/2025  Date of Evaluation: 3/27/2025  Plan of Care Certification: 3/27/2025 to 5/22/2025     Time In: 1345   Time Out: 1427  Total Time: 42   Total Billable Time: 42      Subjective   pt reports she was compliant w/ theraputty HEP.  Pain reported as 0/10.      Objective       No measures obtained this date.     Treatment:    Pt completed ther ex to address UE strength and endurance required for participation in functional tasks. Pt demonstrated the following:  Therapeutic Exercise  TE 1: [employer] UBE for 4 mins forward cycling and 4 mins backward cycling at level 1.0, while seated in w/c  TE 2: seated supported EOM: tricep ext for 10 reps x 3 w/ yellow theraband    Pt completed ther acts to address core/upper body strength and increase I w/ functional transfers. Pt demonstrated the following:  Therapeutic Activity  TA 1: w/c -> EOM lateral transfer using slideboard w/ CGA  TA 2: seated EOM: tricep push-ups on yoga blocks for 10 reps x 3  TA 3: seated EOM: trunk flex/ext w/ forward press on peanut ball for 10 reps x 3  TA 4: EOM -> w/c transfer stand pivot w/ mod A    Time Entry(in minutes):  Therapeutic Activity Time Entry: 42    Assessment & Plan   Assessment: Pt completed therapeutic activities and therapeutic exercises w/ fair energy and good participation. Pt demonstrated fair core strength and fair UE strength w/ activities, taking rest breaks as  needed.  Evaluation/Treatment Tolerance: Patient tolerated treatment well    Patient will continue to benefit from skilled outpatient occupational therapy to address the deficits listed in the problem list box on initial evaluation, provide pt/family education and to maximize pt's level of independence in the home and community environment.     Patient's spiritual, cultural, and educational needs considered and patient agreeable to plan of care and goals.     Education  Education was done with Patient. The patient's learning style includes Listening and Demonstration. The patient Verbalizes understanding and Demonstrates understanding.         Transfer technique using slideboard, anterior trunk lean for improved lift during functional transfers        Plan: continue OT POC    Goals:   Active       LTG       Pt will be independent with Home Exercise Program (HEP)/Home Activity Program (HAP) to promote long term maintenance of progress made in therapy.   (Progressing)       Start:  03/27/25    Expected End:  05/22/25            Pt will transfer w/c <-> EOM w/ SPV in order to increase I w/ functional transfers.  (Progressing)       Start:  03/27/25    Expected End:  05/22/25            Pt will demonstrated dynamic sitting task for 10 mins w/ SPV to increase trunk stability required for participation in functional tasks.  (Progressing)       Start:  03/27/25    Expected End:  05/22/25            Pt will decrease time on 9-hole peg by 5 seconds bilaterally in order to increase FM control/coordination required for participation in functional tasks.  (Progressing)       Start:  03/27/25    Expected End:  05/22/25            Pt/caregiver will verbalize understanding of and obtain any DME/adaptive equipment needed for improved independence w/ ADLs and functional transfers.  (Progressing)       Start:  03/27/25    Expected End:  05/22/25               STG       Pt/caregiver will report 50% compliance with Home Exercise  Program (HEP)/Home Activity Program (HAP) to promote long term maintenance of progress made in therapy.   (Met)       Start:  03/27/25    Expected End:  04/24/25    Resolved:  04/08/25         Pt will transfer w/c <-> EOM w/ min A in order to increase I w/ functional transfers.  (Met)       Start:  03/27/25    Expected End:  04/24/25    Resolved:  04/08/25         Pt will demonstrated dynamic sitting task for 5 mins w/ CGA to increase trunk stability required for participation in functional tasks.  (Progressing)       Start:  03/27/25    Expected End:  04/24/25                Francine Toscano, OT

## 2025-04-14 NOTE — TELEPHONE ENCOUNTER
Updated chart note eFaxed to [facility] at fax# [phone] via [employer].

## 2025-04-24 DIAGNOSIS — G11.9 ATAXIA DUE TO CEREBELLAR DEGENERATION: ICD-10-CM

## 2025-04-24 RX ORDER — BACLOFEN 20 MG/1
20 TABLET ORAL 3 TIMES DAILY
Qty: 90 TABLET | Refills: 1 | Status: SHIPPED | OUTPATIENT
Start: 2025-04-24

## 2025-05-03 ENCOUNTER — HOSPITAL ENCOUNTER (EMERGENCY)
Facility: HOSPITAL | Age: 40
Discharge: HOME | End: 2025-05-03
Attending: STUDENT IN AN ORGANIZED HEALTH CARE EDUCATION/TRAINING PROGRAM
Payer: MEDICAID

## 2025-05-03 VITALS
HEART RATE: 100 BPM | WEIGHT: 115 LBS | OXYGEN SATURATION: 98 % | SYSTOLIC BLOOD PRESSURE: 104 MMHG | HEIGHT: 63 IN | DIASTOLIC BLOOD PRESSURE: 53 MMHG | TEMPERATURE: 98 F | BODY MASS INDEX: 20.38 KG/M2 | RESPIRATION RATE: 15 BRPM

## 2025-05-03 DIAGNOSIS — R33.9 ACUTE ON CHRONIC URINARY RETENTION: Primary | ICD-10-CM

## 2025-05-03 LAB
BILIRUB UR QL STRIP.AUTO: NEGATIVE
CLARITY UR: ABNORMAL
COLOR UR AUTO: YELLOW
GLUCOSE UR QL STRIP: NEGATIVE
HGB UR QL STRIP: ABNORMAL
KETONES UR QL STRIP: NEGATIVE
LEUKOCYTE ESTERASE UR QL STRIP: NEGATIVE
NITRITE UR QL STRIP: NEGATIVE
PH UR STRIP: 7 [PH]
PROT UR QL STRIP: NEGATIVE
SP GR UR STRIP: 1.02
UROBILINOGEN UR STRIP-ACNC: NEGATIVE EU/DL

## 2025-05-03 PROCEDURE — 99283 EMERGENCY DEPT VISIT LOW MDM: CPT

## 2025-05-03 PROCEDURE — 81003 URINALYSIS AUTO W/O SCOPE: CPT | Performed by: STUDENT IN AN ORGANIZED HEALTH CARE EDUCATION/TRAINING PROGRAM

## 2025-05-03 NOTE — ED PROVIDER NOTES
ED Provider Note - 5/3/2025    History     Chief Complaint   Patient presents with    Female  Problem     Presents awake, alert. States h/o neurogenic bladder -  usually performs intermittent catheterizations but he is not home. C/o bladder fullness and discomfort. Denies N/V, fever. No distress noted.       JEFFREY Hernandez is a 39 y.o. year old female with past medical and surgical history as seen below, presenting with chief complaint of urinary retention.  Patient typically has a  or help her to straight cath for her due to neurogenic bladder.   is working and she has been unable to clear her urine since last night.  Complaining of suprapubic discomfort due to this.  No other medical complaints.  No fever, nausea, vomiting, dysuria or hematuria.        Past Medical History:   Diagnosis Date    Cerebral palsy     Gait abnormality     Low blood pressure      Past Surgical History:   Procedure Laterality Date     SECTION      FLUOROSCOPIC URODYNAMIC STUDY N/A 2023    Procedure: URODYNAMIC STUDY, FLUOROSCOPIC;  Surgeon: Bev Davalos MD;  Location: [address];  Service: Urology;  Laterality: N/A;    LAPAROSCOPIC SALPINGECTOMY Bilateral 7/10/2020    Procedure: SALPINGECTOMY, LAPAROSCOPIC;  Surgeon: Rosa Mcclendon MD;  Location: [facility] OR;  Service: OB/GYN;  Laterality: Bilateral;  video confirmed  CW         No family history on file.  Social History[1]  Social Drivers of Health with Concerns     Physical Activity: Inactive (3/21/2025)    Exercise Vital Sign     Days of Exercise per Week: 0 days     Minutes of Exercise per Session: 0 min   Health Literacy: Inadequate Health Literacy (3/21/2025)     Health Literacy     Frequency of need for help with medical instructions: Always   Social Isolation: Not on file      Review of patient's allergies indicates:  No Known Allergies    Review of Systems     A full Review of Systems (ROS) was performed and was negative  "unless otherwise stated in the HPI.      Physical Exam     Vitals:    05/03/25 1459   BP: (!) 104/53   Pulse: 100   Resp: 15   Temp: 98.2 °F (36.8 °C)   TempSrc: Oral   SpO2: 98%   Weight: 52.2 kg (115 lb)   Height: 5' 3" (1.6 m)        Physical Exam    Nursing note and vitals reviewed.  Constitutional: She appears well-developed and well-nourished.   HENT:   Head: Normocephalic and atraumatic.   Eyes: Conjunctivae and EOM are normal.   Cardiovascular:  Intact distal pulses.           Pulmonary/Chest: No respiratory distress.   Abdominal: Abdomen is soft. She exhibits no distension. There is abdominal tenderness (suprapubic). There is no rebound and no guarding.     Neurological: She is alert and oriented to person, place, and time. No cranial nerve deficit. Gait normal.   Skin: Skin is warm and dry.   Psychiatric: She has a normal mood and affect. Her behavior is normal. Thought content normal.         Lab Results- Independently reviewed by myself      Labs Reviewed   URINALYSIS - Abnormal       Result Value    Color, UA Yellow      Appearance, UA Hazy (*)     pH, UA 7.0      Spec Grav UA 1.020      Protein, UA Negative      Glucose, UA Negative      Ketones, UA Negative      Bilirubin, UA Negative      Blood, UA Trace (*)     Nitrites, UA Negative      Urobilinogen, UA Negative      Leukocyte Esterase, UA Negative             Imaging     Imaging Results    None                    ED Course         Procedures         Orders Placed This Encounter    Urinalysis    Straight Cath (In and Out) - do not wait for patient to void    Straight Cath (In and Out)                      Medical Decision Making       The patient's list of active medical problems, social history, medications, and allergies as documented per RN staff has been reviewed.           Medical Decision Making  Patient presents with urinary retention for several hours. Patient has a history of neurogenic bladder, which is the likely cause, in and out " catheterization performed by nursing, and patient pain was relieved. Considered other etiologies but given history, exam and workup have low suspicion for cauda equina, infectious etiology (pyelonephritis or cystitis), constipation induced retention, intraabdominal mass, trauma, nephrolithiasis, urolithiasis, drug reaction. Told to return to ED if symptoms worsen, return, or change in character.        Amount and/or Complexity of Data Reviewed  Labs: ordered.     Details: UA negative for leukocytes and nitrites making urinary infection highly unlikely.                      ED Prescriptions    None           Clinical Impression       Follow-up Information       Follow up With Specialties Details Why Contact Info    Ivett Cardoso MD Internal Medicine Schedule an appointment as soon as possible for a visit in 5 days For follow-up on today's visit. [address] [facility] [address]  [phone]      [facility] Emergency Medicine Go to  As needed, If symptoms worsen [address] [facility] [address] [phone]            Referrals:  No orders of the defined types were placed in this encounter.      Disposition   ED Disposition Condition    Discharge Stable              Final diagnoses:  [R33.9] Acute on chronic urinary retention (Primary)        Amos Choi MD        05/04/2025          DISCLAIMER: This note was prepared with [employer] voice recognition transcription software. Garbled syntax, mangled pronouns, and other bizarre constructions may be attributed to that software system.         [1]   Social History  Tobacco Use    Smoking status: Never     Passive exposure: Never    Smokeless tobacco: Never   Substance Use Topics    Alcohol use: No    Drug use: No        Amos Choi MD  05/04/25 2556

## 2025-05-03 NOTE — ED NOTES
Discontinued [facility] wheelchair van transport per patient's request. Pt states that  is coming soon to pick her up.

## 2025-05-03 NOTE — ED NOTES
Pt brought in by  EMS from home. Pt states she  performs catheterizations for neurogenic bladder but  is not home. Pt states she has been drinking all day and now has full bladder. Pt here for catheterization. NAD noted.

## 2025-06-08 DIAGNOSIS — G11.9 ATAXIA DUE TO CEREBELLAR DEGENERATION: ICD-10-CM

## 2025-06-10 RX ORDER — BACLOFEN 20 MG/1
20 TABLET ORAL 3 TIMES DAILY
Qty: 270 TABLET | Refills: 0 | Status: SHIPPED | OUTPATIENT
Start: 2025-06-10

## 2025-06-26 ENCOUNTER — TELEPHONE (OUTPATIENT)
Dept: UROGYNECOLOGY | Facility: CLINIC | Age: 40
End: 2025-06-26
Payer: MEDICAID

## 2025-06-26 NOTE — TELEPHONE ENCOUNTER
Lvm for pt.Need to know what she is requesting refill for and she needs her yearly appt with urogyn as well. LOV 11/2024      ----- Message from Brea sent at 6/26/2025  1:38 PM CDT -----  Pt would like a call back. She is in need for a prescription. Pt# [phone]

## 2025-06-27 ENCOUNTER — TELEPHONE (OUTPATIENT)
Dept: UROGYNECOLOGY | Facility: CLINIC | Age: 40
End: 2025-06-27
Payer: MEDICAID

## 2025-06-27 NOTE — TELEPHONE ENCOUNTER
Patient left a message for someone to return her call about a prescription for medical supplies . I attempted to call patient x2 who answered but her phone was having trouble .

## 2025-08-20 ENCOUNTER — PATIENT MESSAGE (OUTPATIENT)
Dept: UROGYNECOLOGY | Facility: CLINIC | Age: 40
End: 2025-08-20
Payer: MEDICAID

## 2025-08-20 ENCOUNTER — TELEPHONE (OUTPATIENT)
Dept: UROGYNECOLOGY | Facility: CLINIC | Age: 40
End: 2025-08-20
Payer: MEDICAID

## 2025-08-26 ENCOUNTER — TELEPHONE (OUTPATIENT)
Dept: UROGYNECOLOGY | Facility: CLINIC | Age: 40
End: 2025-08-26
Payer: MEDICAID

## (undated) DEVICE — SEE MEDLINE ITEM 152622

## (undated) DEVICE — SEE MEDLINE ITEM 157117

## (undated) DEVICE — PAD PREP 50/CA

## (undated) DEVICE — TROCAR ENDOPATH XCEL 5X75MM

## (undated) DEVICE — MANIFOLD 4 PORT

## (undated) DEVICE — TROCAR ENDOPATH XCEL 11MM 10CM

## (undated) DEVICE — SEE MEDLINE ITEM 154981

## (undated) DEVICE — COVER OVERHEAD SURG LT BLUE

## (undated) DEVICE — SEALER LIGASURE MARYLAND 37CM

## (undated) DEVICE — SEE MEDLINE ITEM 156955

## (undated) DEVICE — ELECTRODE REM PLYHSV RETURN 9

## (undated) DEVICE — SEE MEDLINE ITEM 157116

## (undated) DEVICE — PACK BASIC

## (undated) DEVICE — TUBING INSUFFLATION 10

## (undated) DEVICE — GAUZE SPONGE 4X4 12PLY

## (undated) DEVICE — BLADE SURG CARBON STEEL SZ11

## (undated) DEVICE — BANDAGE ADHESIVE

## (undated) DEVICE — CLOSURE SKIN STERI STRIP 1/2X4

## (undated) DEVICE — GLOVE SURGICAL LATEX SZ 6.5

## (undated) DEVICE — ADHESIVE DERMABOND ADVANCED

## (undated) DEVICE — TROCAR ENDOPATH XCEL 5MM 7.5CM

## (undated) DEVICE — GLOVE BIOGEL PI MICRO INDIC 7

## (undated) DEVICE — KIT ANTIFOG

## (undated) DEVICE — SYR 10CC LUER LOCK

## (undated) DEVICE — SEE MEDLINE ITEM 152530

## (undated) DEVICE — SEE MEDLINE ITEM 157131

## (undated) DEVICE — TRAY FOLEY 16FR INFECTION CONT

## (undated) DEVICE — KIT WING PAD POSITIONING

## (undated) DEVICE — SEE MEDLINE ITEM 146355

## (undated) DEVICE — NDL HYPO REG 25G X 1 1/2

## (undated) DEVICE — NDL INSUF ULTRA VERESS 120MM

## (undated) DEVICE — DRAPE LAVH LAPAROSCOPY W/FLUID

## (undated) DEVICE — SEE MEDLINE ITEM 152678

## (undated) DEVICE — SYR B-D DISP CONTROL 10CC100/C

## (undated) DEVICE — APPLICATOR CHLORAPREP ORN 26ML